# Patient Record
Sex: FEMALE | Race: ASIAN | NOT HISPANIC OR LATINO | URBAN - METROPOLITAN AREA
[De-identification: names, ages, dates, MRNs, and addresses within clinical notes are randomized per-mention and may not be internally consistent; named-entity substitution may affect disease eponyms.]

---

## 2019-01-09 ENCOUNTER — EMERGENCY (EMERGENCY)
Facility: HOSPITAL | Age: 29
LOS: 1 days | Discharge: HOME | End: 2019-01-09
Attending: EMERGENCY MEDICINE
Payer: MEDICAID

## 2019-01-09 VITALS
TEMPERATURE: 98 F | OXYGEN SATURATION: 100 % | WEIGHT: 141.98 LBS | HEIGHT: 63 IN | SYSTOLIC BLOOD PRESSURE: 100 MMHG | DIASTOLIC BLOOD PRESSURE: 64 MMHG | HEART RATE: 72 BPM | RESPIRATION RATE: 18 BRPM

## 2019-01-09 VITALS
TEMPERATURE: 98 F | DIASTOLIC BLOOD PRESSURE: 63 MMHG | OXYGEN SATURATION: 100 % | SYSTOLIC BLOOD PRESSURE: 101 MMHG | HEART RATE: 76 BPM | RESPIRATION RATE: 16 BRPM

## 2019-01-09 LAB
APPEARANCE UR: ABNORMAL
BACTERIA # UR AUTO: ABNORMAL
BILIRUB UR-MCNC: NEGATIVE — SIGNIFICANT CHANGE UP
COLOR SPEC: YELLOW — SIGNIFICANT CHANGE UP
DIFF PNL FLD: NEGATIVE — SIGNIFICANT CHANGE UP
EPI CELLS # UR: 35 /HPF — HIGH
GLUCOSE UR QL: NEGATIVE — SIGNIFICANT CHANGE UP
HYALINE CASTS # UR AUTO: 7 /LPF — HIGH (ref 0–2)
KETONES UR-MCNC: NEGATIVE — SIGNIFICANT CHANGE UP
LEUKOCYTE ESTERASE UR-ACNC: ABNORMAL
NITRITE UR-MCNC: NEGATIVE — SIGNIFICANT CHANGE UP
PH UR: 6 — SIGNIFICANT CHANGE UP (ref 5–8)
PROT UR-MCNC: ABNORMAL
RBC CASTS # UR COMP ASSIST: 2 /HPF — SIGNIFICANT CHANGE UP (ref 0–4)
SP GR SPEC: 1.03 — HIGH (ref 1.01–1.02)
UROBILINOGEN FLD QL: NEGATIVE — SIGNIFICANT CHANGE UP
WBC UR QL: 61 /HPF — HIGH (ref 0–5)

## 2019-01-09 PROCEDURE — 99283 EMERGENCY DEPT VISIT LOW MDM: CPT

## 2019-01-09 PROCEDURE — 87186 SC STD MICRODIL/AGAR DIL: CPT

## 2019-01-09 PROCEDURE — 81001 URINALYSIS AUTO W/SCOPE: CPT

## 2019-01-09 PROCEDURE — 87086 URINE CULTURE/COLONY COUNT: CPT

## 2019-01-09 RX ORDER — CEPHALEXIN 500 MG
1 CAPSULE ORAL
Qty: 15 | Refills: 0
Start: 2019-01-09 | End: 2019-01-13

## 2019-01-09 RX ORDER — METRONIDAZOLE 500 MG
1 TABLET ORAL
Qty: 21 | Refills: 0
Start: 2019-01-09 | End: 2019-01-15

## 2019-01-09 RX ORDER — METRONIDAZOLE 500 MG
500 TABLET ORAL ONCE
Qty: 0 | Refills: 0 | Status: COMPLETED | OUTPATIENT
Start: 2019-01-09 | End: 2019-01-09

## 2019-01-09 RX ORDER — CEPHALEXIN 500 MG
500 CAPSULE ORAL ONCE
Qty: 0 | Refills: 0 | Status: COMPLETED | OUTPATIENT
Start: 2019-01-09 | End: 2019-01-09

## 2019-01-09 RX ADMIN — Medication 500 MILLIGRAM(S): at 22:06

## 2019-01-09 RX ADMIN — Medication 500 MILLIGRAM(S): at 22:07

## 2019-01-09 NOTE — ED ADULT NURSE NOTE - OBJECTIVE STATEMENT
Patient is a 28 female complaining of abdominal pain. Arrived by walk-in. Patient has no significant PMH. Patient is A&O x 3. Pt reports abdominal pain for the past 2 months, has been seen by OB/GYN at home and pts  states she has a UTI, but was only prescribed ibuprofen and no antibiotics.  pt states she has dysuria during and after urination, and complains of frequency. Denies complaints of chest pain, sob, fevers, chills, n/v/d, headache, syncope, blood in urine, blood in stool. Abd is soft, tender to palpation in umbilicus and Lower quadrants, non distended . Skin is warm and dry. Color is consistent with ethnicity. LMP was dec 15. Safety and comfort maintained.  at the bedside. Will continue to monitor.

## 2019-01-09 NOTE — ED ADULT NURSE NOTE - CHPI ED NUR SYMPTOMS NEG
no blood in stool/no diarrhea/no abdominal distension/no nausea/no fever/no hematuria/no vomiting/no chills

## 2019-01-09 NOTE — ED PROVIDER NOTE - NSFOLLOWUPCLINICS_GEN_ALL_ED_FT
[facility] - [facility]  OB-GYN  [address] [place] [address]  Phone: [phone]  Fax:   Follow Up Time:

## 2019-01-09 NOTE — ED PROVIDER NOTE - OBJECTIVE STATEMENT
Patient presenting reporting burning with urination and lower abdominal dyscomfort for 2 months.  Seen initially for this by ADRIÁN in [place], reportedly had urine testing and some kind of imaging study, told to take tylenol and motrin.  Now moved to [place].  Reporting pain constant for past two months, primarily suprapubic associated with decreased urination.  Denying external lesions or rashes.  Symptoms improve with tylenol and motrin, no fevers or chills.

## 2019-01-09 NOTE — ED ADULT NURSE NOTE - NSIMPLEMENTINTERV_GEN_ALL_ED
Implemented All Universal Safety Interventions:  [place] to call system. Call bell, personal items and telephone within reach. Instruct patient to call for assistance. Room bathroom lighting operational. Non-slip footwear when patient is off stretcher. Physically safe environment: no spills, clutter or unnecessary equipment. Stretcher in lowest position, wheels locked, appropriate side rails in place.

## 2019-01-09 NOTE — ED PROVIDER NOTE - NSFOLLOWUPINSTRUCTIONS_ED_ALL_ED_FT
Take antibiotics as prescribed, follow up with OBGYN for further care.  Stop using douches or other vaginal cleaning products as these disturb your normal vaginal bacteria.

## 2019-01-09 NOTE — ED PROVIDER NOTE - GENITOURINARY, MLM
No CVA tenderness No CVA tenderness, thin white discharge present in vaginal vault, exam chaperoned by Emergency Department jimmy Martinez

## 2019-01-09 NOTE — ED PROVIDER NOTE - MEDICAL DECISION MAKING DETAILS
Patient presenting with 2 months of dysuria/burning on urination, will obtain UA/UC/U preg and re-evaluate Patient presenting with 2 months of dysuria/burning on urination, will obtain UA/UC/U preg and re-evaluate, exam also consistent with possible BV.

## 2019-01-11 NOTE — ED POST DISCHARGE NOTE - DETAILS
Pt started keflex, has been feeling less dysuria and frequency, no recorded fevers, chills, flank pain. Discussed urine culture results, to stop cephalexin and start macrobid and f.u with PMD this week for further evaluation. Translation from , declines  services. Discussed need to return with fevers, chills, flank pain, worsening or concerning symptoms, she notes understanding. -Rosalie Davidson PA-C 1/12/19: Pt started keflex, has been feeling less dysuria and frequency, no recorded fevers, chills, flank pain. Discussed urine culture results, to stop cephalexin and start macrobid and f.u with PMD this week for further evaluation. Translation from , declines  services. Discussed need to return with fevers, chills, flank pain, worsening or concerning symptoms, she notes understanding. -Rosalie Davidson PA-C

## 2019-01-11 NOTE — ED POST DISCHARGE NOTE - OTHER COMMUNICATION
1/11/19: Prelim ucx results as above. Pt discharged on Keflex for UTI. Will await final sensitivities to determine need for contact vs appropriate care given.  - Soy Savage PA-C

## 2019-01-12 RX ORDER — METHENAMINE MANDELATE 1 G
1 TABLET ORAL
Qty: 10 | Refills: 0
Start: 2019-01-12 | End: 2019-01-16

## 2019-02-01 ENCOUNTER — EMERGENCY (EMERGENCY)
Facility: HOSPITAL | Age: 29
LOS: 1 days | Discharge: HOME | End: 2019-02-01
Attending: EMERGENCY MEDICINE
Payer: MEDICAID

## 2019-02-01 ENCOUNTER — OUTPATIENT (OUTPATIENT)
Dept: OUTPATIENT SERVICES | Facility: HOSPITAL | Age: 29
LOS: 1 days | End: 2019-02-01
Payer: MEDICAID

## 2019-02-01 VITALS
RESPIRATION RATE: 16 BRPM | HEIGHT: 62 IN | SYSTOLIC BLOOD PRESSURE: 104 MMHG | DIASTOLIC BLOOD PRESSURE: 71 MMHG | OXYGEN SATURATION: 100 % | TEMPERATURE: 98 F | WEIGHT: 143.96 LBS | HEART RATE: 79 BPM

## 2019-02-01 PROCEDURE — G9001: CPT

## 2019-02-01 PROCEDURE — 99284 EMERGENCY DEPT VISIT MOD MDM: CPT

## 2019-02-01 NOTE — ED ADULT NURSE NOTE - NSIMPLEMENTINTERV_GEN_ALL_ED
Implemented All Universal Safety Interventions:  [place] to call system. Call bell, personal items and telephone within reach. Instruct patient to call for assistance. Room bathroom lighting operational. Non-slip footwear when patient is off stretcher. Physically safe environment: no spills, clutter or unnecessary equipment. Stretcher in lowest position, wheels locked, appropriate side rails in place.

## 2019-02-01 NOTE — ED ADULT NURSE NOTE - OBJECTIVE STATEMENT
patient came in complaining of right buttock pain radiating to right leg, right lower quadrant pain. whitish vaginal discharges. Subjective fever. Burning pain on urination. Symptoms started 5-6 days pta.

## 2019-02-02 VITALS
SYSTOLIC BLOOD PRESSURE: 113 MMHG | HEART RATE: 78 BPM | RESPIRATION RATE: 17 BRPM | DIASTOLIC BLOOD PRESSURE: 76 MMHG | TEMPERATURE: 98 F | OXYGEN SATURATION: 100 %

## 2019-02-02 LAB
APPEARANCE UR: CLEAR — SIGNIFICANT CHANGE UP
BACTERIA # UR AUTO: NEGATIVE — SIGNIFICANT CHANGE UP
BILIRUB UR-MCNC: NEGATIVE — SIGNIFICANT CHANGE UP
COLOR SPEC: SIGNIFICANT CHANGE UP
DIFF PNL FLD: NEGATIVE — SIGNIFICANT CHANGE UP
EPI CELLS # UR: 6 /HPF — HIGH
GLUCOSE UR QL: NEGATIVE — SIGNIFICANT CHANGE UP
HYALINE CASTS # UR AUTO: 0 /LPF — SIGNIFICANT CHANGE UP (ref 0–2)
KETONES UR-MCNC: NEGATIVE — SIGNIFICANT CHANGE UP
LEUKOCYTE ESTERASE UR-ACNC: ABNORMAL
N GONORRHOEA RRNA SPEC QL NAA+PROBE: SIGNIFICANT CHANGE UP
NITRITE UR-MCNC: NEGATIVE — SIGNIFICANT CHANGE UP
PH UR: 7 — SIGNIFICANT CHANGE UP (ref 5–8)
PROT UR-MCNC: NEGATIVE — SIGNIFICANT CHANGE UP
RBC CASTS # UR COMP ASSIST: 1 /HPF — SIGNIFICANT CHANGE UP (ref 0–4)
SP GR SPEC: 1.02 — SIGNIFICANT CHANGE UP (ref 1.01–1.02)
SPECIMEN SOURCE: SIGNIFICANT CHANGE UP
UROBILINOGEN FLD QL: NEGATIVE — SIGNIFICANT CHANGE UP
WBC UR QL: 9 /HPF — HIGH (ref 0–5)

## 2019-02-02 PROCEDURE — 99284 EMERGENCY DEPT VISIT MOD MDM: CPT

## 2019-02-02 PROCEDURE — 76830 TRANSVAGINAL US NON-OB: CPT

## 2019-02-02 PROCEDURE — 76830 TRANSVAGINAL US NON-OB: CPT | Mod: 26

## 2019-02-02 PROCEDURE — 76705 ECHO EXAM OF ABDOMEN: CPT | Mod: 26,59

## 2019-02-02 PROCEDURE — 93975 VASCULAR STUDY: CPT

## 2019-02-02 PROCEDURE — 76705 ECHO EXAM OF ABDOMEN: CPT

## 2019-02-02 PROCEDURE — 87591 N.GONORRHOEAE DNA AMP PROB: CPT

## 2019-02-02 PROCEDURE — 87086 URINE CULTURE/COLONY COUNT: CPT

## 2019-02-02 PROCEDURE — 81001 URINALYSIS AUTO W/SCOPE: CPT

## 2019-02-02 PROCEDURE — 93975 VASCULAR STUDY: CPT | Mod: 26

## 2019-02-02 RX ORDER — FLUCONAZOLE 150 MG/1
200 TABLET ORAL ONCE
Qty: 0 | Refills: 0 | Status: COMPLETED | OUTPATIENT
Start: 2019-02-02 | End: 2019-02-02

## 2019-02-02 RX ORDER — IBUPROFEN 200 MG
600 TABLET ORAL ONCE
Qty: 0 | Refills: 0 | Status: COMPLETED | OUTPATIENT
Start: 2019-02-02 | End: 2019-02-02

## 2019-02-02 RX ORDER — ACETAMINOPHEN 500 MG
975 TABLET ORAL ONCE
Qty: 0 | Refills: 0 | Status: COMPLETED | OUTPATIENT
Start: 2019-02-02 | End: 2019-02-02

## 2019-02-02 RX ADMIN — FLUCONAZOLE 200 MILLIGRAM(S): 150 TABLET ORAL at 07:14

## 2019-02-02 RX ADMIN — Medication 600 MILLIGRAM(S): at 07:14

## 2019-02-02 RX ADMIN — Medication 975 MILLIGRAM(S): at 07:14

## 2019-02-02 NOTE — ED PROVIDER NOTE - MEDICAL DECISION MAKING DETAILS
Harvey WRIGHT MD PGY1: 28 F p/w UTI sxs with previous urine cx resistant to multiple organisms. Will Harvey WRIGHT MD PGY1: 28 F p/w UTI sxs with previous urine cx resistant to multiple organisms. Will obtain UA, Ucx, and transvaginal us to eval for ovarian cyst/pathology. Likely dispo home with follow-up with OBGYN.

## 2019-02-02 NOTE — ED PROVIDER NOTE - NSFOLLOWUPCLINICS_GEN_ALL_ED_FT
[facility] - [facility]  OB-GYN  [address] [address]  Phone: [phone]  Fax:   Follow Up Time:

## 2019-02-02 NOTE — ED ADULT NURSE REASSESSMENT NOTE - NS ED NURSE REASSESS COMMENT FT1
received report from YOUSIF Wing. patient is resting comfortably in bed in no acute distress. patient is AAOx3. lung sounds clear. cap refill <3sec. patient c/o abdominal pain 5/10 medication given as ordered. VSS. to be d.c
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
## 2019-02-02 NOTE — ED PROVIDER NOTE - CARE PLAN
Principal Discharge DX:	Cystitis Principal Discharge DX:	Ovarian cyst  Secondary Diagnosis:	Yeast infection of the vagina

## 2019-02-02 NOTE — ED PROVIDER NOTE - PHYSICAL EXAMINATION
Harvey WRIGHT MD PGY1:   PHYSICAL EXAM:    GENERAL: NAD, well-developed  HEENT:  Atraumatic, Normocephalic  CHEST/LUNG: Chest rise equal bilaterally.   HEART: Regular rate and rhythm  ABDOMEN: Suprapubic TTP, no CVA tenderness.  : R adnexal TTP. Physiologic white vaginal discharge.   EXTREMITIES:  2+ Peripheral Pulses.  PSYCH: A&Ox3  SKIN: No obvious rashes or lesions

## 2019-02-02 NOTE — ED PROVIDER NOTE - OBJECTIVE STATEMENT
Harvey WRIGHT MD PGY1: 28 F hx multiple UTI p/w Harvey WRIGHT MD PGY1: 28 F hx multiple UTI p/w dysuria and frequency x 5 days without exacerbating or relieving factors asssoc with white vaginal discharge. No fevers or chills. Has had the white vaginal discharge x 1 month not improved with metronidazole therapy. No back pain. Prev Ucx resistant to multiple organisms.

## 2019-02-02 NOTE — ED PROVIDER NOTE - NSFOLLOWUPINSTRUCTIONS_ED_ALL_ED_FT
You were seen in the ED for abdominal pain, pain with urination and vaginal discharge. We treated you with diflucan, diagnosed a hemorragic cyst in your right ovary, and don't think you have a UTI. Please return to the ED for any concerns. Please follow-up with OBGYN as an outpatient. Please take tylenol and motrin as needed for pain.

## 2019-02-04 LAB
CULTURE RESULTS: SIGNIFICANT CHANGE UP
SPECIMEN SOURCE: SIGNIFICANT CHANGE UP

## 2019-02-08 DIAGNOSIS — Z71.89 OTHER SPECIFIED COUNSELING: ICD-10-CM

## 2019-03-15 ENCOUNTER — EMERGENCY (EMERGENCY)
Facility: HOSPITAL | Age: 29
LOS: 1 days | Discharge: HOME | End: 2019-03-15
Attending: EMERGENCY MEDICINE
Payer: COMMERCIAL

## 2019-03-15 VITALS
TEMPERATURE: 98 F | DIASTOLIC BLOOD PRESSURE: 68 MMHG | HEART RATE: 70 BPM | SYSTOLIC BLOOD PRESSURE: 103 MMHG | OXYGEN SATURATION: 99 % | RESPIRATION RATE: 17 BRPM

## 2019-03-15 VITALS
OXYGEN SATURATION: 100 % | DIASTOLIC BLOOD PRESSURE: 62 MMHG | RESPIRATION RATE: 19 BRPM | HEART RATE: 64 BPM | TEMPERATURE: 98 F | SYSTOLIC BLOOD PRESSURE: 96 MMHG

## 2019-03-15 LAB
ALBUMIN SERPL ELPH-MCNC: 4.3 G/DL — SIGNIFICANT CHANGE UP (ref 3.3–5)
ALP SERPL-CCNC: 52 U/L — SIGNIFICANT CHANGE UP (ref 40–120)
ALT FLD-CCNC: 19 U/L — SIGNIFICANT CHANGE UP (ref 10–45)
ANION GAP SERPL CALC-SCNC: 12 MMOL/L — SIGNIFICANT CHANGE UP (ref 5–17)
APPEARANCE UR: ABNORMAL
APTT BLD: 31.1 SEC — SIGNIFICANT CHANGE UP (ref 27.5–36.3)
AST SERPL-CCNC: 22 U/L — SIGNIFICANT CHANGE UP (ref 10–40)
BACTERIA # UR AUTO: ABNORMAL
BASOPHILS # BLD AUTO: 0.1 K/UL — SIGNIFICANT CHANGE UP (ref 0–0.2)
BASOPHILS NFR BLD AUTO: 0.7 % — SIGNIFICANT CHANGE UP (ref 0–2)
BILIRUB SERPL-MCNC: 0.1 MG/DL — LOW (ref 0.2–1.2)
BILIRUB UR-MCNC: NEGATIVE — SIGNIFICANT CHANGE UP
BLD GP AB SCN SERPL QL: NEGATIVE — SIGNIFICANT CHANGE UP
BUN SERPL-MCNC: 11 MG/DL — SIGNIFICANT CHANGE UP (ref 7–23)
CALCIUM SERPL-MCNC: 9.2 MG/DL — SIGNIFICANT CHANGE UP (ref 8.4–10.5)
CHLORIDE SERPL-SCNC: 106 MMOL/L — SIGNIFICANT CHANGE UP (ref 96–108)
CO2 SERPL-SCNC: 20 MMOL/L — LOW (ref 22–31)
COLOR SPEC: SIGNIFICANT CHANGE UP
CREAT SERPL-MCNC: 0.66 MG/DL — SIGNIFICANT CHANGE UP (ref 0.5–1.3)
DIFF PNL FLD: NEGATIVE — SIGNIFICANT CHANGE UP
EOSINOPHIL # BLD AUTO: 0.2 K/UL — SIGNIFICANT CHANGE UP (ref 0–0.5)
EOSINOPHIL NFR BLD AUTO: 1.8 % — SIGNIFICANT CHANGE UP (ref 0–6)
EPI CELLS # UR: 15 /HPF — HIGH
GLUCOSE SERPL-MCNC: 99 MG/DL — SIGNIFICANT CHANGE UP (ref 70–99)
GLUCOSE UR QL: NEGATIVE — SIGNIFICANT CHANGE UP
HCG SERPL-ACNC: <2 MIU/ML — SIGNIFICANT CHANGE UP
HCT VFR BLD CALC: 36.4 % — SIGNIFICANT CHANGE UP (ref 34.5–45)
HGB BLD-MCNC: 12.5 G/DL — SIGNIFICANT CHANGE UP (ref 11.5–15.5)
HYALINE CASTS # UR AUTO: 3 /LPF — HIGH (ref 0–2)
INR BLD: 1.02 RATIO — SIGNIFICANT CHANGE UP (ref 0.88–1.16)
KETONES UR-MCNC: SIGNIFICANT CHANGE UP
LEUKOCYTE ESTERASE UR-ACNC: ABNORMAL
LIDOCAIN IGE QN: 37 U/L — SIGNIFICANT CHANGE UP (ref 7–60)
LYMPHOCYTES # BLD AUTO: 3.9 K/UL — HIGH (ref 1–3.3)
LYMPHOCYTES # BLD AUTO: 37.8 % — SIGNIFICANT CHANGE UP (ref 13–44)
MCHC RBC-ENTMCNC: 29 PG — SIGNIFICANT CHANGE UP (ref 27–34)
MCHC RBC-ENTMCNC: 34.2 GM/DL — SIGNIFICANT CHANGE UP (ref 32–36)
MCV RBC AUTO: 84.7 FL — SIGNIFICANT CHANGE UP (ref 80–100)
MONOCYTES # BLD AUTO: 0.8 K/UL — SIGNIFICANT CHANGE UP (ref 0–0.9)
MONOCYTES NFR BLD AUTO: 7.5 % — SIGNIFICANT CHANGE UP (ref 2–14)
NEUTROPHILS # BLD AUTO: 5.4 K/UL — SIGNIFICANT CHANGE UP (ref 1.8–7.4)
NEUTROPHILS NFR BLD AUTO: 52.3 % — SIGNIFICANT CHANGE UP (ref 43–77)
NITRITE UR-MCNC: NEGATIVE — SIGNIFICANT CHANGE UP
PH UR: 6 — SIGNIFICANT CHANGE UP (ref 5–8)
PLATELET # BLD AUTO: 261 K/UL — SIGNIFICANT CHANGE UP (ref 150–400)
POTASSIUM SERPL-MCNC: 4.1 MMOL/L — SIGNIFICANT CHANGE UP (ref 3.5–5.3)
POTASSIUM SERPL-SCNC: 4.1 MMOL/L — SIGNIFICANT CHANGE UP (ref 3.5–5.3)
PROT SERPL-MCNC: 7 G/DL — SIGNIFICANT CHANGE UP (ref 6–8.3)
PROT UR-MCNC: NEGATIVE — SIGNIFICANT CHANGE UP
PROTHROM AB SERPL-ACNC: 11.6 SEC — SIGNIFICANT CHANGE UP (ref 10–12.9)
RBC # BLD: 4.3 M/UL — SIGNIFICANT CHANGE UP (ref 3.8–5.2)
RBC # FLD: 14.5 % — SIGNIFICANT CHANGE UP (ref 10.3–14.5)
RBC CASTS # UR COMP ASSIST: 1 /HPF — SIGNIFICANT CHANGE UP (ref 0–4)
RH IG SCN BLD-IMP: POSITIVE — SIGNIFICANT CHANGE UP
SODIUM SERPL-SCNC: 138 MMOL/L — SIGNIFICANT CHANGE UP (ref 135–145)
SP GR SPEC: 1.01 — SIGNIFICANT CHANGE UP (ref 1.01–1.02)
UROBILINOGEN FLD QL: NEGATIVE — SIGNIFICANT CHANGE UP
WBC # BLD: 10.4 K/UL — SIGNIFICANT CHANGE UP (ref 3.8–10.5)
WBC # FLD AUTO: 10.4 K/UL — SIGNIFICANT CHANGE UP (ref 3.8–10.5)
WBC UR QL: 7 /HPF — HIGH (ref 0–5)

## 2019-03-15 PROCEDURE — 99284 EMERGENCY DEPT VISIT MOD MDM: CPT | Mod: 25

## 2019-03-15 PROCEDURE — 70450 CT HEAD/BRAIN W/O DYE: CPT

## 2019-03-15 PROCEDURE — 73630 X-RAY EXAM OF FOOT: CPT

## 2019-03-15 PROCEDURE — 86850 RBC ANTIBODY SCREEN: CPT

## 2019-03-15 PROCEDURE — 86901 BLOOD TYPING SEROLOGIC RH(D): CPT

## 2019-03-15 PROCEDURE — 99284 EMERGENCY DEPT VISIT MOD MDM: CPT

## 2019-03-15 PROCEDURE — 71045 X-RAY EXAM CHEST 1 VIEW: CPT | Mod: 26

## 2019-03-15 PROCEDURE — 71260 CT THORAX DX C+: CPT

## 2019-03-15 PROCEDURE — 72125 CT NECK SPINE W/O DYE: CPT | Mod: 26

## 2019-03-15 PROCEDURE — 73630 X-RAY EXAM OF FOOT: CPT | Mod: 26,LT

## 2019-03-15 PROCEDURE — 85730 THROMBOPLASTIN TIME PARTIAL: CPT

## 2019-03-15 PROCEDURE — 72125 CT NECK SPINE W/O DYE: CPT

## 2019-03-15 PROCEDURE — 74177 CT ABD & PELVIS W/CONTRAST: CPT | Mod: 26

## 2019-03-15 PROCEDURE — 71260 CT THORAX DX C+: CPT | Mod: 26

## 2019-03-15 PROCEDURE — 73610 X-RAY EXAM OF ANKLE: CPT | Mod: 26,LT

## 2019-03-15 PROCEDURE — 85610 PROTHROMBIN TIME: CPT

## 2019-03-15 PROCEDURE — 83690 ASSAY OF LIPASE: CPT

## 2019-03-15 PROCEDURE — 81001 URINALYSIS AUTO W/SCOPE: CPT

## 2019-03-15 PROCEDURE — 84702 CHORIONIC GONADOTROPIN TEST: CPT

## 2019-03-15 PROCEDURE — 80053 COMPREHEN METABOLIC PANEL: CPT

## 2019-03-15 PROCEDURE — 96374 THER/PROPH/DIAG INJ IV PUSH: CPT | Mod: XU

## 2019-03-15 PROCEDURE — 74177 CT ABD & PELVIS W/CONTRAST: CPT

## 2019-03-15 PROCEDURE — 85027 COMPLETE CBC AUTOMATED: CPT

## 2019-03-15 PROCEDURE — 86900 BLOOD TYPING SEROLOGIC ABO: CPT

## 2019-03-15 PROCEDURE — 70450 CT HEAD/BRAIN W/O DYE: CPT | Mod: 26

## 2019-03-15 PROCEDURE — 73610 X-RAY EXAM OF ANKLE: CPT

## 2019-03-15 PROCEDURE — 71045 X-RAY EXAM CHEST 1 VIEW: CPT

## 2019-03-15 RX ORDER — SODIUM CHLORIDE 9 MG/ML
1000 INJECTION INTRAMUSCULAR; INTRAVENOUS; SUBCUTANEOUS ONCE
Qty: 0 | Refills: 0 | Status: COMPLETED | OUTPATIENT
Start: 2019-03-15 | End: 2019-03-15

## 2019-03-15 RX ORDER — ONDANSETRON 8 MG/1
4 TABLET, FILM COATED ORAL ONCE
Qty: 0 | Refills: 0 | Status: COMPLETED | OUTPATIENT
Start: 2019-03-15 | End: 2019-03-15

## 2019-03-15 RX ADMIN — SODIUM CHLORIDE 1000 MILLILITER(S): 9 INJECTION INTRAMUSCULAR; INTRAVENOUS; SUBCUTANEOUS at 19:22

## 2019-03-15 RX ADMIN — ONDANSETRON 4 MILLIGRAM(S): 8 TABLET, FILM COATED ORAL at 19:22
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
## 2019-03-15 NOTE — ED ADULT NURSE NOTE - CHPI ED NUR SYMPTOMS NEG
no difficulty bearing weight/no disorientation/no dizziness/no acting out behaviors/no laceration/no fussiness/no crying/no decreased eating/drinking/no loss of consciousness/no sleeping issues/no bruising

## 2019-03-15 NOTE — ED PROVIDER NOTE - PROGRESS NOTE DETAILS
B-hcg sent.  Pt understands that even if + she will require CTs given concern for intra-abd/thoracic/cranial injury in light of physical exam findings.  Understands, agrees.   service used for this conversation.  --MAYRA C- collar's cleared by Dr. Cee. Full ROMs, no midline tender. B/L para cervical tender without swelling or lesions. No focal neuro deficit.
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
## 2019-03-15 NOTE — ED PROVIDER NOTE - NSFOLLOWUPINSTRUCTIONS_ED_ALL_ED_FT
Hydrate.  Motrin (Advil or Ibuprofen) 600mg every 8hours for pain with food.  Tylenol 500mg or 650mg every 6hours for pain as needed.  Follow up with your primary Dr. for reevaluation in 2-3days.  Follow up with your GYN for incidental finding of ovarian cyst in this week, call Monday for an appointment.  Follow up with spine center, [phone], call Monday for an appointment.  Return for any concerns or worsening symptoms.

## 2019-03-15 NOTE — ED PROVIDER NOTE - PHYSICAL EXAMINATION
GENERAL: AAOx4, GCS 15, NAD, WDWN; HEENT: MMM, no jugular venous distension, c-collar in place, +midline < R paracervical TTP, PERRLA, EOMI, nonicteric sclera; PULM: CTA B, no crackles/rubs/rales.  Chest wall TTP at sternum without stepoff/crepitus/flail segment; CV: RRR, S1S2, no MRG; ABD: Obese, soft abd, +b/l lower abd TTP, no R/G/R, no CVAT.  MSK: L ankle TTP without deformity, worse at lat mal and prox 5th MT.  BURCH, +2 pulses x4;  NEURO: No obvious focal deficits; PSYCH: AAOx3, clear thought and normal sensorium.  SKIN -- no seatbelt sign, ana/grey moran.

## 2019-12-06 ENCOUNTER — EMERGENCY (EMERGENCY)
Facility: HOSPITAL | Age: 29
LOS: 1 days | Discharge: HOME | End: 2019-12-06
Attending: EMERGENCY MEDICINE | Admitting: EMERGENCY MEDICINE
Payer: MEDICAID

## 2019-12-06 VITALS
OXYGEN SATURATION: 99 % | RESPIRATION RATE: 15 BRPM | SYSTOLIC BLOOD PRESSURE: 113 MMHG | HEART RATE: 118 BPM | TEMPERATURE: 99 F | DIASTOLIC BLOOD PRESSURE: 72 MMHG

## 2019-12-06 VITALS
RESPIRATION RATE: 16 BRPM | OXYGEN SATURATION: 99 % | SYSTOLIC BLOOD PRESSURE: 115 MMHG | HEART RATE: 107 BPM | DIASTOLIC BLOOD PRESSURE: 64 MMHG | TEMPERATURE: 98 F

## 2019-12-06 PROCEDURE — 99283 EMERGENCY DEPT VISIT LOW MDM: CPT

## 2019-12-06 RX ORDER — SODIUM CHLORIDE 9 MG/ML
1000 INJECTION INTRAMUSCULAR; INTRAVENOUS; SUBCUTANEOUS ONCE
Refills: 0 | Status: COMPLETED | OUTPATIENT
Start: 2019-12-06 | End: 2019-12-06

## 2019-12-06 RX ADMIN — SODIUM CHLORIDE 1000 MILLILITER(S): 9 INJECTION INTRAMUSCULAR; INTRAVENOUS; SUBCUTANEOUS at 04:41
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
## 2019-12-06 NOTE — ED PROVIDER NOTE - ATTENDING CONTRIBUTION TO CARE
MD Gayle:  I performed a face to face bedside interview with patient regarding history of present illness, review of symptoms and past medical history. I completed an independent physical exam(documented below).  I have discussed patient's plan of care with resident.   I agree with note as stated above, having amended the EMR as needed to reflect my findings. I have discussed the assessment and plan of care.  This includes during the time I functioned as the attending physician for this patient.  PE:  Gen: Alert, NAD, mildly inebriated  Head: NC, AT,  EOMI, normal lids/conjunctiva  ENT:  normal hearing, patent oropharynx without erythema/exudate  Neck: +supple, no tenderness/meningismus/JVD, +Trachea midline  Chest: no chest wall tenderness, equal chest rise  Pulm: Bilateral BS, normal resp effort, no wheeze/stridor/retractions  CV: tachy, no M/R/G, +dist pulses  Abd: +BS, soft, NT/ND  Rectal: deferred  Mskel: no edema/erythema/cyanosis  Skin: no rash  Neuro: AAOx3, no sensory/motor deficits, CN 2-12 intact   MDM:  28yo F, pmh of asthma, c/o palpitations after drinking alcohol for first time ever ("tall shot of crown royal"). Denies cp (despite mention of this on triage note), sob, hx of arrhythmias, dizziness/lightheadedness. Denies SI/HI/hallucinations. ECG is sinus tachycardia w/o ischemic findings. Mildly clinically dehydrated. IV fluids and d/c to family's care.

## 2019-12-06 NOTE — ED PROVIDER NOTE - PATIENT PORTAL LINK FT
You can access the FollowMyHealth Patient Portal offered by [facility] by registering at the following website: [email or web address]. By joining [facility]’s FollowMyHealth portal, you will also be able to view your health information using other applications (apps) compatible with our system.

## 2019-12-06 NOTE — ED PROVIDER NOTE - NSFOLLOWUPINSTRUCTIONS_ED_ALL_ED_FT
Please follow up with your primary care physician.  If you developed worsening feelings of sadness, please seek medical or psychological help.

## 2019-12-06 NOTE — ED PROVIDER NOTE - PHYSICAL EXAMINATION
Gen:  NAD, alert and oriented, mildly intoxicated  HEENT:  MMM, sclera clear  Heart:  Tachycardic, regular rhythm, no M/R/G  Lung:  CTA b/l, no rales or wheeze  Abd:  ND, soft, NT  Ext:  No edema  Skin:  No rash, no ecchymosis  Neuro:  Nonfocal

## 2019-12-06 NOTE — ED PROVIDER NOTE - OBJECTIVE STATEMENT
29 y.o. female p/w palpitations after her first experience ever drinking alcohol.  She was feeling sad and lonely after thinking about her inability to have children, and drank a tall shot of [place], and then felt palpitations.  She denies SI or HI.  She feels safe at home.  She is , and lives with other cousins and family members as well.  She works as a home health aid.  No other medical conditions.

## 2020-01-21 PROBLEM — Z00.00 ENCOUNTER FOR PREVENTIVE HEALTH EXAMINATION: Status: ACTIVE | Noted: 2020-01-21

## 2020-01-23 ENCOUNTER — RESULT CHARGE (OUTPATIENT)
Age: 30
End: 2020-01-23

## 2020-01-23 ENCOUNTER — OUTPATIENT (OUTPATIENT)
Dept: OUTPATIENT SERVICES | Facility: HOSPITAL | Age: 30
LOS: 1 days | End: 2020-01-23
Payer: MEDICAID

## 2020-01-23 ENCOUNTER — LABORATORY RESULT (OUTPATIENT)
Age: 30
End: 2020-01-23

## 2020-01-23 ENCOUNTER — APPOINTMENT (OUTPATIENT)
Dept: OBGYN | Facility: HOSPITAL | Age: 30
End: 2020-01-23
Payer: SELF-PAY

## 2020-01-23 ENCOUNTER — RESULT REVIEW (OUTPATIENT)
Age: 30
End: 2020-01-23

## 2020-01-23 VITALS
WEIGHT: 160.8 LBS | HEIGHT: 63 IN | HEART RATE: 71 BPM | DIASTOLIC BLOOD PRESSURE: 63 MMHG | SYSTOLIC BLOOD PRESSURE: 112 MMHG | BODY MASS INDEX: 28.49 KG/M2

## 2020-01-23 DIAGNOSIS — Z80.1 FAMILY HISTORY OF MALIGNANT NEOPLASM OF TRACHEA, BRONCHUS AND LUNG: ICD-10-CM

## 2020-01-23 DIAGNOSIS — Z78.9 OTHER SPECIFIED HEALTH STATUS: ICD-10-CM

## 2020-01-23 LAB
APPEARANCE UR: CLEAR — SIGNIFICANT CHANGE UP
BACTERIA # UR AUTO: NEGATIVE — SIGNIFICANT CHANGE UP
BILIRUB UR-MCNC: NEGATIVE — SIGNIFICANT CHANGE UP
BLOOD UR QL VISUAL: NEGATIVE — SIGNIFICANT CHANGE UP
COLOR SPEC: YELLOW — SIGNIFICANT CHANGE UP
GLUCOSE UR-MCNC: NEGATIVE — SIGNIFICANT CHANGE UP
HCG UR QL: NEGATIVE
HYALINE CASTS # UR AUTO: SIGNIFICANT CHANGE UP
KETONES UR-MCNC: NEGATIVE — SIGNIFICANT CHANGE UP
LEUKOCYTE ESTERASE UR-ACNC: SIGNIFICANT CHANGE UP
NITRITE UR-MCNC: NEGATIVE — SIGNIFICANT CHANGE UP
PH UR: 6.5 — SIGNIFICANT CHANGE UP (ref 5–8)
PROT UR-MCNC: 20 — SIGNIFICANT CHANGE UP
RBC CASTS # UR COMP ASSIST: SIGNIFICANT CHANGE UP (ref 0–?)
SP GR SPEC: 1.03 — SIGNIFICANT CHANGE UP (ref 1–1.04)
SQUAMOUS # UR AUTO: SIGNIFICANT CHANGE UP
UROBILINOGEN FLD QL: SIGNIFICANT CHANGE UP
WBC UR QL: HIGH (ref 0–?)

## 2020-01-23 PROCEDURE — 99203 OFFICE O/P NEW LOW 30 MIN: CPT | Mod: GE

## 2020-01-23 PROCEDURE — 88141 CYTOPATH C/V INTERPRET: CPT

## 2020-01-24 LAB
C TRACH RRNA SPEC QL NAA+PROBE: SIGNIFICANT CHANGE UP
N GONORRHOEA RRNA SPEC QL NAA+PROBE: SIGNIFICANT CHANGE UP
SPECIMEN SOURCE: SIGNIFICANT CHANGE UP
SPECIMEN SOURCE: SIGNIFICANT CHANGE UP

## 2020-01-25 LAB — BACTERIA UR CULT: SIGNIFICANT CHANGE UP

## 2020-01-28 DIAGNOSIS — N97.9 FEMALE INFERTILITY, UNSPECIFIED: ICD-10-CM

## 2020-01-28 DIAGNOSIS — N64.52 NIPPLE DISCHARGE: ICD-10-CM

## 2020-01-28 DIAGNOSIS — Z80.1 FAMILY HISTORY OF MALIGNANT NEOPLASM OF TRACHEA, BRONCHUS AND LUNG: ICD-10-CM

## 2020-01-28 DIAGNOSIS — Z78.9 OTHER SPECIFIED HEALTH STATUS: ICD-10-CM

## 2020-01-28 DIAGNOSIS — R30.0 DYSURIA: ICD-10-CM

## 2020-01-29 LAB — CYTOLOGY SPEC DOC CYTO: SIGNIFICANT CHANGE UP

## 2020-01-29 NOTE — REVIEW OF SYSTEMS
[Dysuria] : dysuria [Breast Pain] : breast pain [Nl] : Musculoskeletal [Pelvic Pain] : no pelvic pain [Urgency] : no urgency [Frequency] : no frequency [Breast Lump] : no breast lump [Skin Lesions] : no skin lesions

## 2020-01-29 NOTE — PHYSICAL EXAM
[Awake] : awake [Alert] : alert [Examination Of The Breasts] : a normal appearance [No Discharge] : no discharge [Breast Mass Left Breast ___cm] : no mass was palpable [___cm] : a ~M [unfilled] ~Ucm subareolar mass was palpated [Soft] : soft [Oriented x3] : oriented to person, place, and time [No Bleeding] : there was no active vaginal bleeding [Normal] : uterus [Normal Position] : in a normal position [Uterine Adnexae] : were not tender and not enlarged [Acute Distress] : no acute distress [Axillary Lymph Nodes Enlarged Bilaterally] : no enlarged nodes [Tender] : non tender

## 2020-01-30 DIAGNOSIS — N63.0 UNSPECIFIED LUMP IN UNSPECIFIED BREAST: ICD-10-CM

## 2020-01-30 RX ORDER — FLUCONAZOLE 150 MG/1
150 TABLET ORAL
Qty: 1 | Refills: 0 | Status: ACTIVE | COMMUNITY
Start: 2020-01-30 | End: 1900-01-01

## 2020-02-18 ENCOUNTER — LABORATORY RESULT (OUTPATIENT)
Age: 30
End: 2020-02-18

## 2020-02-18 ENCOUNTER — OUTPATIENT (OUTPATIENT)
Dept: OUTPATIENT SERVICES | Facility: HOSPITAL | Age: 30
LOS: 1 days | End: 2020-02-18

## 2020-02-18 ENCOUNTER — APPOINTMENT (OUTPATIENT)
Dept: OBGYN | Facility: HOSPITAL | Age: 30
End: 2020-02-18

## 2020-02-18 LAB
BLD GP AB SCN SERPL QL: NEGATIVE — SIGNIFICANT CHANGE UP
RH IG SCN BLD-IMP: POSITIVE — SIGNIFICANT CHANGE UP
TSH SERPL-MCNC: 2.24 UIU/ML — SIGNIFICANT CHANGE UP (ref 0.27–4.2)

## 2020-02-19 DIAGNOSIS — Z00.00 ENCOUNTER FOR GENERAL ADULT MEDICAL EXAMINATION WITHOUT ABNORMAL FINDINGS: ICD-10-CM

## 2020-02-19 LAB
ESTRADIOL FREE SERPL-MCNC: 41 PG/ML — SIGNIFICANT CHANGE UP
FSH SERPL-MCNC: 6.5 IU/L — SIGNIFICANT CHANGE UP
HBV SURFACE AG SER-ACNC: NONREACTIVE — SIGNIFICANT CHANGE UP
HCV AB S/CO SERPL IA: 0.1 S/CO — SIGNIFICANT CHANGE UP (ref 0–0.99)
HCV AB SERPL-IMP: SIGNIFICANT CHANGE UP
HIV 1+2 AB+HIV1 P24 AG SERPL QL IA: SIGNIFICANT CHANGE UP
PROLACTIN SERPL-MCNC: 23.4 NG/ML — SIGNIFICANT CHANGE UP (ref 3.4–24.1)
RUBV IGG SER-ACNC: 17.6 INDEX — SIGNIFICANT CHANGE UP
RUBV IGG SER-IMP: POSITIVE — SIGNIFICANT CHANGE UP
VZV IGG FLD QL IA: 24 INDEX — SIGNIFICANT CHANGE UP
VZV IGG FLD QL IA: NEGATIVE — SIGNIFICANT CHANGE UP

## 2020-03-02 ENCOUNTER — EMERGENCY (EMERGENCY)
Facility: HOSPITAL | Age: 30
LOS: 1 days | Discharge: HOME | End: 2020-03-02
Attending: EMERGENCY MEDICINE | Admitting: PERSONAL EMERGENCY RESPONSE ATTENDANT
Payer: MEDICAID

## 2020-03-02 VITALS
SYSTOLIC BLOOD PRESSURE: 115 MMHG | RESPIRATION RATE: 16 BRPM | OXYGEN SATURATION: 100 % | DIASTOLIC BLOOD PRESSURE: 68 MMHG | TEMPERATURE: 98 F | HEART RATE: 71 BPM

## 2020-03-02 VITALS
SYSTOLIC BLOOD PRESSURE: 105 MMHG | TEMPERATURE: 98 F | RESPIRATION RATE: 18 BRPM | HEART RATE: 67 BPM | DIASTOLIC BLOOD PRESSURE: 61 MMHG | OXYGEN SATURATION: 100 %

## 2020-03-02 LAB
APPEARANCE UR: SIGNIFICANT CHANGE UP
BACTERIA # UR AUTO: SIGNIFICANT CHANGE UP
BILIRUB UR-MCNC: NEGATIVE — SIGNIFICANT CHANGE UP
BLOOD UR QL VISUAL: HIGH
C TRACH RRNA SPEC QL NAA+PROBE: SIGNIFICANT CHANGE UP
COLOR SPEC: SIGNIFICANT CHANGE UP
GLUCOSE UR-MCNC: NEGATIVE — SIGNIFICANT CHANGE UP
HYALINE CASTS # UR AUTO: SIGNIFICANT CHANGE UP
KETONES UR-MCNC: NEGATIVE — SIGNIFICANT CHANGE UP
LEUKOCYTE ESTERASE UR-ACNC: SIGNIFICANT CHANGE UP
N GONORRHOEA RRNA SPEC QL NAA+PROBE: SIGNIFICANT CHANGE UP
NITRITE UR-MCNC: NEGATIVE — SIGNIFICANT CHANGE UP
PH UR: 6.5 — SIGNIFICANT CHANGE UP (ref 5–8)
PROT UR-MCNC: 50 — SIGNIFICANT CHANGE UP
RBC CASTS # UR COMP ASSIST: HIGH (ref 0–?)
SP GR SPEC: 1.01 — SIGNIFICANT CHANGE UP (ref 1–1.04)
SPECIMEN SOURCE: SIGNIFICANT CHANGE UP
SPECIMEN SOURCE: SIGNIFICANT CHANGE UP
SQUAMOUS # UR AUTO: SIGNIFICANT CHANGE UP
UROBILINOGEN FLD QL: NORMAL — SIGNIFICANT CHANGE UP
WBC UR QL: >50 — HIGH (ref 0–?)

## 2020-03-02 PROCEDURE — 99284 EMERGENCY DEPT VISIT MOD MDM: CPT

## 2020-03-02 PROCEDURE — 76830 TRANSVAGINAL US NON-OB: CPT | Mod: 26

## 2020-03-02 RX ORDER — PHENAZOPYRIDINE HCL 100 MG
100 TABLET ORAL ONCE
Refills: 0 | Status: COMPLETED | OUTPATIENT
Start: 2020-03-02 | End: 2020-03-02

## 2020-03-02 RX ORDER — METHENAMINE MANDELATE 1 G
1 TABLET ORAL
Qty: 10 | Refills: 0
Start: 2020-03-02 | End: 2020-03-06

## 2020-03-02 RX ORDER — PHENAZOPYRIDINE HCL 100 MG
1 TABLET ORAL
Qty: 6 | Refills: 0
Start: 2020-03-02 | End: 2020-03-03

## 2020-03-02 RX ORDER — NITROFURANTOIN MACROCRYSTAL 50 MG
100 CAPSULE ORAL ONCE
Refills: 0 | Status: COMPLETED | OUTPATIENT
Start: 2020-03-02 | End: 2020-03-02

## 2020-03-02 RX ADMIN — Medication 100 MILLIGRAM(S): at 06:51

## 2020-03-02 RX ADMIN — Medication 100 MILLIGRAM(S): at 05:35

## 2020-03-02 NOTE — ED PROVIDER NOTE - PROGRESS NOTE DETAILS
Héctor, PGY1 - Pt endorses improvement in urinary symptoms. Héctor, PGY1 - Pt endorses improvement in urinary symptoms. No hx STDs, & cervix & Héctor, PGY1 - Pt endorses improvement in urinary symptoms. No hx STDs, & cervix was not erythematous & cervical dc was physiologic.  As such won't emperically tx for STDs but will send dirty urine. Attending MD Fernandes.  Pt signed out to me in stable condition pending U/S d/c with abxs if neg, 31 yo female with hx UTI's, physiol d/c, no presumptive tx, adnexal TTP to us. Attending MD Fernandes.  Pt's us non-actionable.  Sxs improved from arrival.  Stable for d/c with abxs for UTI.  Return to ED for fevers/chills/n/v/d/inability to tolerate abxs or PO/development of flank pain or inability to urinate.

## 2020-03-02 NOTE — ED ADULT NURSE NOTE - CHIEF COMPLAINT QUOTE
Pt arrives with  Non English Sydni speaking. c/o too much burning pain in vaginal area especially when urinating...started last night. Pt appears very uncomfortable. + urinary frequency . As per  "same happen few months back went to [facility] treated for a bacterial infection...that time she had fevers."

## 2020-03-02 NOTE — ED ADULT TRIAGE NOTE - SPO2 (%)
Patient is being referred by Dr Liz for Pulmonary Nodule.  Would like patient seen asap please.   100

## 2020-03-02 NOTE — ED PROVIDER NOTE - ATTENDING CONTRIBUTION TO CARE
30F hx freq UTIs incl ESBL E.coli 1/*2019 now p/w dysuria and urinary urgency x 2 days. Subjective fever today, but no n/v. No chills.  Similar to prior UTIs.  Associated with malodorous, white vaginal discharge for past week. LMP 2 weeks ago.    VS: afebrile, others   Gen: Well appearing in NAD  Head: NC/AT  HEENT: moist mucous membranes   Neck: trachea midline  Resp:  No distress  Abd: +SP ttp w/o CVAT  Ext: no deformities  Neuro:  A&Ox3  Skin:  Warm and dry as visualized  Psych:  appropriate affect and mood    MDM: c/w UTI w/o concerns for pyelo or other systemic infx. Will offer pelvic exam given vag dc.  UA, UCx, UCG, possible STD testing

## 2020-03-02 NOTE — ED PROVIDER NOTE - PHYSICAL EXAMINATION
I have reviewed the triage vital signs.  Const: AAOx3, uncomfortable appearing, but non-toxic  Eyes: no conjunctival injection  HENT: NCAT, Neck supple, oral mucosa moist  CV: RRR, +S1, S2  Resp: CTAB, no respiratory distress  GI: Abdomen soft, nondistended, +suprapubic TTP, no guarding, no CVA tenderness  Extremities: No peripheral edema,  2+ radial and DP pulses  Skin: Warm, well perfused, no rash  MSK: No gross deformities appreciated  Neuro: No focal sensory or motor deficits  Psych: Appropriate mood and affect I have reviewed the triage vital signs.  Const: AAOx3, uncomfortable appearing, but non-toxic  Eyes: no conjunctival injection  HENT: NCAT, Neck supple, oral mucosa moist  CV: RRR, +S1, S2  Resp: CTAB, no respiratory distress  GI: Abdomen soft, nondistended, +suprapubic TTP, no guarding, no CVA tenderness  : Pelvic exam chaperoned by YOUSIF Sofia. External genitalia unremarkable. Speculum exam with normal appearing whitish vaginal discharge. Vaginal wall mucosa unremarkable. Cervix closed in appearance with mild bleeding without lesions. Bimanual exam with L adnexal tenderness.  Extremities: No peripheral edema,  2+ radial and DP pulses  Skin: Warm, well perfused, no rash  MSK: No gross deformities appreciated  Neuro: No focal sensory or motor deficits  Psych: Appropriate mood and affect I have reviewed the triage vital signs.  Const: AAOx3, uncomfortable appearing, but non-toxic  Eyes: no conjunctival injection  HENT: NCAT, Neck supple, oral mucosa moist  CV: RRR, +S1, S2  Resp: CTAB, no respiratory distress  GI: Abdomen soft, nondistended, +suprapubic TTP, no guarding, no CVA tenderness  : Pelvic exam chaperoned by YOUSIF Sofia. External genitalia unremarkable. Speculum exam with normal appearing whitish vaginal discharge. Vaginal wall mucosa unremarkable. Cervix closed in appearance with mild bleeding without lesions or erythema. Bimanual exam with L adnexal tenderness.  Extremities: No peripheral edema,  2+ radial and DP pulses  Skin: Warm, well perfused, no rash  MSK: No gross deformities appreciated  Neuro: No focal sensory or motor deficits  Psych: Appropriate mood and affect

## 2020-03-02 NOTE — ED ADULT TRIAGE NOTE - CHIEF COMPLAINT QUOTE
Pt arrives with  Non English Sydni speaking. c/o too much burning pain in vaginal area especially when urinating...started last night. Pt appears very uncomfortable. + urinary frequency . As per  "same happen few months back went to [facility] treated for a bacterial infection...that time she had fevers."

## 2020-03-02 NOTE — ED ADULT NURSE NOTE - NSSUHOSCREENINGYN_ED_ALL_ED
Refill request is for a maintenance medication and has met the criteria specified in the Ambulatory Medication Refill Standing Order for eligibility, visits, laboratory, alerts and was sent to the requested pharmacy.     Requested Prescriptions     Signed P Yes - the patient is able to be screened

## 2020-03-02 NOTE — ED PROVIDER NOTE - NSFOLLOWUPINSTRUCTIONS_ED_ALL_ED_FT
You were seen in the ER for a urinary tract infection.    Follow up with your primary care doctor within 48 hours.    A prescription for Macrobid (antibiotics) was sent to your pharmacy. Take as directed on the packaging.    Return to the ER for any new or worsening symptoms or concerns, such as fever, chills, nausea/vomiting, flank pain, etc.

## 2020-03-02 NOTE — ED PROVIDER NOTE - OBJECTIVE STATEMENT
30F h/o multiple UTIs including ESBL E.coli presents with dysuria and urinary urgency x 2 days. Subjective fever today, but no n/v. Associated with malodorous, white vaginal discharge for past week. LMP 2 weeks ago.

## 2020-03-02 NOTE — ED ADULT NURSE REASSESSMENT NOTE - NS ED NURSE REASSESS COMMENT FT1
Patient resting comfortably at this time. Reports relief of pain after medication. MD Anaya at bedside. Awaiting further orders, will continue to monitor.

## 2020-03-02 NOTE — ED PROVIDER NOTE - CLINICAL SUMMARY MEDICAL DECISION MAKING FREE TEXT BOX
Héctor, PGY1 - 30F h/o multiple UTIs, including ESBL E.coli p/w dysuria and urinary urgency x 2 days. No systemic sx. Vitals WNL. UTI, low suspicion for pyelonephritis. Plan: abx, sx control Anaya, PGY1 - 30F h/o multiple UTIs, including ESBL E.coli p/w dysuria and urinary urgency x 2 days. No systemic sx. Vitals WNL. +Suprapubic TTP and L adnexal tenderness. UTI, low suspicion for pyelonephritis. Will evaluate for ovarian cyst, ovarian torsion, TOA. Plan: UA, UCx, TVUS, abx, sx control

## 2020-03-02 NOTE — ED PROVIDER NOTE - PATIENT PORTAL LINK FT
You can access the FollowMyHealth Patient Portal offered by [facility] by registering at the following website: [email or web address]. By joining [employer]’s FollowMyHealth portal, you will also be able to view your health information using other applications (apps) compatible with our system.

## 2020-03-02 NOTE — ED PROVIDER NOTE - NS ED ROS FT
General: +Subjective fever  Eyes: Denies vision changes  ENMT: Denies trouble swallowing or speaking, or sore throat  CV: Denies chest pain or palpitations  Resp: Denies cough or SOB  GI: +Lower abdominal pain. No nausea, vomiting, diarrhea, or constipation   :+Painful urination, urinary urgency. No blood in urine  Skin: Denies new rashes  Neuro: Denies headache, numbness, or weakness  MSK: Denies recent trauma

## 2020-03-02 NOTE — ED ADULT NURSE NOTE - OBJECTIVE STATEMENT
Patient received to room 9, A&Ox4, ambulatory, c/o painful urination for two days. Pt. states she has been having urinary frequency and burning with urination worsening over the past two days. Patient denies hematuria. States she had similar symptoms a few months ago and was treated at [facility] for a bacterial infection. Denies radiation of pain. Denies CP/SOB/HA, no fevers/chills, no nausea/vomiting. VS as noted. Medicated as per orders. Labs sent. MD at bedside. Awaiting further orders, will continue to monitor. Patient received to room 9, A&Ox4, ambulatory, c/o painful urination for two days. Pt. states she has been having urinary frequency and burning with urination worsening over the past two days. Patient denies hematuria. States she had similar symptoms a few months ago and was treated at [facility] for a bacterial infection. Abdomen soft, nontender, nondistended.  LMP 2 weeks ago, UCG negative today. Denies radiation of pain. Denies CP/SOB/HA, no fevers/chills, no nausea/vomiting. Breathing even and unlabored. Patient appears uncomfortable. VS as noted. Medicated as per orders. Labs sent. MD at bedside. Awaiting further orders, will continue to monitor.

## 2020-03-05 ENCOUNTER — INPATIENT (INPATIENT)
Facility: HOSPITAL | Age: 30
LOS: 2 days | Discharge: HOME | End: 2020-03-08
Attending: INTERNAL MEDICINE | Admitting: INTERNAL MEDICINE
Payer: COMMERCIAL

## 2020-03-05 VITALS
OXYGEN SATURATION: 100 % | RESPIRATION RATE: 16 BRPM | HEART RATE: 82 BPM | SYSTOLIC BLOOD PRESSURE: 112 MMHG | TEMPERATURE: 98 F | DIASTOLIC BLOOD PRESSURE: 64 MMHG

## 2020-03-05 LAB
-  AMIKACIN: SIGNIFICANT CHANGE UP
-  AMPICILLIN/SULBACTAM: SIGNIFICANT CHANGE UP
-  AMPICILLIN: SIGNIFICANT CHANGE UP
-  AZTREONAM: SIGNIFICANT CHANGE UP
-  CEFAZOLIN: SIGNIFICANT CHANGE UP
-  CEFEPIME: SIGNIFICANT CHANGE UP
-  CEFOXITIN: SIGNIFICANT CHANGE UP
-  CEFTAZIDIME: SIGNIFICANT CHANGE UP
-  CEFTRIAXONE: SIGNIFICANT CHANGE UP
-  ERTAPENEM: SIGNIFICANT CHANGE UP
-  GENTAMICIN: SIGNIFICANT CHANGE UP
-  IMIPENEM: SIGNIFICANT CHANGE UP
-  LEVOFLOXACIN: SIGNIFICANT CHANGE UP
-  MEROPENEM: SIGNIFICANT CHANGE UP
-  NITROFURANTOIN: SIGNIFICANT CHANGE UP
-  PIPERACILLIN/TAZOBACTAM: SIGNIFICANT CHANGE UP
-  TIGECYCLINE: SIGNIFICANT CHANGE UP
-  TOBRAMYCIN: SIGNIFICANT CHANGE UP
-  TRIMETHOPRIM/SULFAMETHOXAZOLE: SIGNIFICANT CHANGE UP
ALBUMIN SERPL ELPH-MCNC: 4.5 G/DL — SIGNIFICANT CHANGE UP (ref 3.3–5)
ALP SERPL-CCNC: 77 U/L — SIGNIFICANT CHANGE UP (ref 40–120)
ALT FLD-CCNC: 14 U/L — SIGNIFICANT CHANGE UP (ref 4–33)
ANION GAP SERPL CALC-SCNC: 14 MMO/L — SIGNIFICANT CHANGE UP (ref 7–14)
APPEARANCE UR: SIGNIFICANT CHANGE UP
AST SERPL-CCNC: 19 U/L — SIGNIFICANT CHANGE UP (ref 4–32)
BACTERIA # UR AUTO: SIGNIFICANT CHANGE UP
BACTERIA UR CULT: SIGNIFICANT CHANGE UP
BASOPHILS # BLD AUTO: 0.03 K/UL — SIGNIFICANT CHANGE UP (ref 0–0.2)
BASOPHILS NFR BLD AUTO: 0.3 % — SIGNIFICANT CHANGE UP (ref 0–2)
BILIRUB SERPL-MCNC: 0.2 MG/DL — SIGNIFICANT CHANGE UP (ref 0.2–1.2)
BILIRUB UR-MCNC: SIGNIFICANT CHANGE UP
BLOOD UR QL VISUAL: NEGATIVE — SIGNIFICANT CHANGE UP
BUN SERPL-MCNC: 13 MG/DL — SIGNIFICANT CHANGE UP (ref 7–23)
CALCIUM SERPL-MCNC: 10.1 MG/DL — SIGNIFICANT CHANGE UP (ref 8.4–10.5)
CHLORIDE SERPL-SCNC: 99 MMOL/L — SIGNIFICANT CHANGE UP (ref 98–107)
CO2 SERPL-SCNC: 25 MMOL/L — SIGNIFICANT CHANGE UP (ref 22–31)
COLOR SPEC: SIGNIFICANT CHANGE UP
CREAT SERPL-MCNC: 0.84 MG/DL — SIGNIFICANT CHANGE UP (ref 0.5–1.3)
EOSINOPHIL # BLD AUTO: 0.12 K/UL — SIGNIFICANT CHANGE UP (ref 0–0.5)
EOSINOPHIL NFR BLD AUTO: 1.1 % — SIGNIFICANT CHANGE UP (ref 0–6)
GLUCOSE SERPL-MCNC: 95 MG/DL — SIGNIFICANT CHANGE UP (ref 70–99)
GLUCOSE UR-MCNC: NEGATIVE — SIGNIFICANT CHANGE UP
HCT VFR BLD CALC: 38.4 % — SIGNIFICANT CHANGE UP (ref 34.5–45)
HGB BLD-MCNC: 12.4 G/DL — SIGNIFICANT CHANGE UP (ref 11.5–15.5)
HYALINE CASTS # UR AUTO: NEGATIVE — SIGNIFICANT CHANGE UP
IMM GRANULOCYTES NFR BLD AUTO: 0.5 % — SIGNIFICANT CHANGE UP (ref 0–1.5)
KETONES UR-MCNC: NEGATIVE — SIGNIFICANT CHANGE UP
LEUKOCYTE ESTERASE UR-ACNC: SIGNIFICANT CHANGE UP
LYMPHOCYTES # BLD AUTO: 3.69 K/UL — HIGH (ref 1–3.3)
LYMPHOCYTES # BLD AUTO: 33.5 % — SIGNIFICANT CHANGE UP (ref 13–44)
MCHC RBC-ENTMCNC: 28.2 PG — SIGNIFICANT CHANGE UP (ref 27–34)
MCHC RBC-ENTMCNC: 32.3 % — SIGNIFICANT CHANGE UP (ref 32–36)
MCV RBC AUTO: 87.3 FL — SIGNIFICANT CHANGE UP (ref 80–100)
METHOD TYPE: SIGNIFICANT CHANGE UP
MONOCYTES # BLD AUTO: 0.74 K/UL — SIGNIFICANT CHANGE UP (ref 0–0.9)
MONOCYTES NFR BLD AUTO: 6.7 % — SIGNIFICANT CHANGE UP (ref 2–14)
NEUTROPHILS # BLD AUTO: 6.38 K/UL — SIGNIFICANT CHANGE UP (ref 1.8–7.4)
NEUTROPHILS NFR BLD AUTO: 57.9 % — SIGNIFICANT CHANGE UP (ref 43–77)
NITRITE UR-MCNC: POSITIVE — HIGH
NRBC # FLD: 0 K/UL — SIGNIFICANT CHANGE UP (ref 0–0)
ORGANISM # SPEC MICROSCOPIC CNT: SIGNIFICANT CHANGE UP
PH UR: 7 — SIGNIFICANT CHANGE UP (ref 5–8)
PLATELET # BLD AUTO: 287 K/UL — SIGNIFICANT CHANGE UP (ref 150–400)
PMV BLD: 10.9 FL — SIGNIFICANT CHANGE UP (ref 7–13)
POTASSIUM SERPL-MCNC: 3.9 MMOL/L — SIGNIFICANT CHANGE UP (ref 3.5–5.3)
POTASSIUM SERPL-SCNC: 3.9 MMOL/L — SIGNIFICANT CHANGE UP (ref 3.5–5.3)
PROT SERPL-MCNC: 7.8 G/DL — SIGNIFICANT CHANGE UP (ref 6–8.3)
PROT UR-MCNC: NEGATIVE — SIGNIFICANT CHANGE UP
RBC # BLD: 4.4 M/UL — SIGNIFICANT CHANGE UP (ref 3.8–5.2)
RBC # FLD: 13.9 % — SIGNIFICANT CHANGE UP (ref 10.3–14.5)
RBC CASTS # UR COMP ASSIST: SIGNIFICANT CHANGE UP (ref 0–?)
SODIUM SERPL-SCNC: 138 MMOL/L — SIGNIFICANT CHANGE UP (ref 135–145)
SP GR SPEC: 1.01 — SIGNIFICANT CHANGE UP (ref 1–1.04)
SQUAMOUS # UR AUTO: SIGNIFICANT CHANGE UP
UROBILINOGEN FLD QL: SIGNIFICANT CHANGE UP
WBC # BLD: 11.01 K/UL — HIGH (ref 3.8–10.5)
WBC # FLD AUTO: 11.01 K/UL — HIGH (ref 3.8–10.5)
WBC UR QL: HIGH (ref 0–?)

## 2020-03-05 RX ORDER — SODIUM CHLORIDE 9 MG/ML
1000 INJECTION INTRAMUSCULAR; INTRAVENOUS; SUBCUTANEOUS ONCE
Refills: 0 | Status: COMPLETED | OUTPATIENT
Start: 2020-03-05 | End: 2020-03-05

## 2020-03-05 RX ADMIN — SODIUM CHLORIDE 1000 MILLILITER(S): 9 INJECTION INTRAMUSCULAR; INTRAVENOUS; SUBCUTANEOUS at 22:05

## 2020-03-05 NOTE — ED POST DISCHARGE NOTE - DETAILS
[phone] - As per pt , pt c/o dysuria, hematuria and fever.  Pt has a follow up appt with urologist scheduled for Monday as first time appt. Discussed with  pt should return to ED today to be re-evaluated.  Pt will return to ED.

## 2020-03-05 NOTE — ED PROVIDER NOTE - OBJECTIVE STATEMENT
Patient is a 30F h/o multiple UTIs including ESBL E.coli presents with dysuria and urinary urgency x 2 days. Recalled to the ED for positive Urine culture of ESBL. patient is currently taking Macrobid. She endorses subjective fever, lower back pain., but no n/v.

## 2020-03-05 NOTE — ED PROVIDER NOTE - ATTENDING CONTRIBUTION TO CARE
Pt with UTI d/c on macrobid, called back for ucx growing ESBL e.coli, EVER 32 for macrobid, pt still not feeling well, will admit to medicine for IV abx.

## 2020-03-05 NOTE — ED PROVIDER NOTE - CLINICAL SUMMARY MEDICAL DECISION MAKING FREE TEXT BOX
30F h/o multiple UTIs including ESBL, recalled for urine culture for ESBL. plan for routine lab, including blood and urine cx, patient will be admitted for IV abx

## 2020-03-05 NOTE — ED ADULT NURSE NOTE - OBJECTIVE STATEMENT
came into er stated that she has had lower abd pain with vomiting for the last three days; no diarrhea just pain; stated had some vomiting yesterday but none today and has been urinating and did have regular bm today

## 2020-03-05 NOTE — ED ADULT NURSE NOTE - NSIMPLEMENTINTERV_GEN_ALL_ED
Implemented All Universal Safety Interventions:  [place] to call system. Call bell, personal items and telephone within reach. Instruct patient to call for assistance. Room bathroom lighting operational. Non-slip footwear when patient is off stretcher. Physically safe environment: no spills, clutter or unnecessary equipment. Stretcher in lowest position, wheels locked, appropriate side rails in place.

## 2020-03-06 DIAGNOSIS — Z29.9 ENCOUNTER FOR PROPHYLACTIC MEASURES, UNSPECIFIED: ICD-10-CM

## 2020-03-06 DIAGNOSIS — A49.9 BACTERIAL INFECTION, UNSPECIFIED: ICD-10-CM

## 2020-03-06 DIAGNOSIS — N39.0 URINARY TRACT INFECTION, SITE NOT SPECIFIED: ICD-10-CM

## 2020-03-06 DIAGNOSIS — Z02.9 ENCOUNTER FOR ADMINISTRATIVE EXAMINATIONS, UNSPECIFIED: ICD-10-CM

## 2020-03-06 LAB
ANION GAP SERPL CALC-SCNC: 11 MMO/L — SIGNIFICANT CHANGE UP (ref 7–14)
BASOPHILS # BLD AUTO: 0.04 K/UL — SIGNIFICANT CHANGE UP (ref 0–0.2)
BASOPHILS NFR BLD AUTO: 0.3 % — SIGNIFICANT CHANGE UP (ref 0–2)
BUN SERPL-MCNC: 16 MG/DL — SIGNIFICANT CHANGE UP (ref 7–23)
CALCIUM SERPL-MCNC: 9.5 MG/DL — SIGNIFICANT CHANGE UP (ref 8.4–10.5)
CHLORIDE SERPL-SCNC: 100 MMOL/L — SIGNIFICANT CHANGE UP (ref 98–107)
CHOLEST SERPL-MCNC: 190 MG/DL — SIGNIFICANT CHANGE UP (ref 120–199)
CO2 SERPL-SCNC: 25 MMOL/L — SIGNIFICANT CHANGE UP (ref 22–31)
CREAT SERPL-MCNC: 0.84 MG/DL — SIGNIFICANT CHANGE UP (ref 0.5–1.3)
EOSINOPHIL # BLD AUTO: 0.12 K/UL — SIGNIFICANT CHANGE UP (ref 0–0.5)
EOSINOPHIL NFR BLD AUTO: 1 % — SIGNIFICANT CHANGE UP (ref 0–6)
GLUCOSE SERPL-MCNC: 102 MG/DL — HIGH (ref 70–99)
HBA1C BLD-MCNC: 5.5 % — SIGNIFICANT CHANGE UP (ref 4–5.6)
HCT VFR BLD CALC: 38.3 % — SIGNIFICANT CHANGE UP (ref 34.5–45)
HDLC SERPL-MCNC: 47 MG/DL — SIGNIFICANT CHANGE UP (ref 45–65)
HGB BLD-MCNC: 12.5 G/DL — SIGNIFICANT CHANGE UP (ref 11.5–15.5)
IMM GRANULOCYTES NFR BLD AUTO: 0.4 % — SIGNIFICANT CHANGE UP (ref 0–1.5)
LIPID PNL WITH DIRECT LDL SERPL: 124 MG/DL — SIGNIFICANT CHANGE UP
LYMPHOCYTES # BLD AUTO: 35.4 % — SIGNIFICANT CHANGE UP (ref 13–44)
LYMPHOCYTES # BLD AUTO: 4.07 K/UL — HIGH (ref 1–3.3)
MAGNESIUM SERPL-MCNC: 2 MG/DL — SIGNIFICANT CHANGE UP (ref 1.6–2.6)
MCHC RBC-ENTMCNC: 28.6 PG — SIGNIFICANT CHANGE UP (ref 27–34)
MCHC RBC-ENTMCNC: 32.6 % — SIGNIFICANT CHANGE UP (ref 32–36)
MCV RBC AUTO: 87.6 FL — SIGNIFICANT CHANGE UP (ref 80–100)
MONOCYTES # BLD AUTO: 1.03 K/UL — HIGH (ref 0–0.9)
MONOCYTES NFR BLD AUTO: 9 % — SIGNIFICANT CHANGE UP (ref 2–14)
NEUTROPHILS # BLD AUTO: 6.18 K/UL — SIGNIFICANT CHANGE UP (ref 1.8–7.4)
NEUTROPHILS NFR BLD AUTO: 53.9 % — SIGNIFICANT CHANGE UP (ref 43–77)
NRBC # FLD: 0 K/UL — SIGNIFICANT CHANGE UP (ref 0–0)
PHOSPHATE SERPL-MCNC: 3.4 MG/DL — SIGNIFICANT CHANGE UP (ref 2.5–4.5)
PLATELET # BLD AUTO: 286 K/UL — SIGNIFICANT CHANGE UP (ref 150–400)
PMV BLD: 10.6 FL — SIGNIFICANT CHANGE UP (ref 7–13)
POTASSIUM SERPL-MCNC: 3.8 MMOL/L — SIGNIFICANT CHANGE UP (ref 3.5–5.3)
POTASSIUM SERPL-SCNC: 3.8 MMOL/L — SIGNIFICANT CHANGE UP (ref 3.5–5.3)
RBC # BLD: 4.37 M/UL — SIGNIFICANT CHANGE UP (ref 3.8–5.2)
RBC # FLD: 14 % — SIGNIFICANT CHANGE UP (ref 10.3–14.5)
SODIUM SERPL-SCNC: 136 MMOL/L — SIGNIFICANT CHANGE UP (ref 135–145)
TRIGL SERPL-MCNC: 201 MG/DL — HIGH (ref 10–149)
TSH SERPL-MCNC: 5.25 UIU/ML — HIGH (ref 0.27–4.2)
WBC # BLD: 11.49 K/UL — HIGH (ref 3.8–10.5)
WBC # FLD AUTO: 11.49 K/UL — HIGH (ref 3.8–10.5)

## 2020-03-06 PROCEDURE — 76770 US EXAM ABDO BACK WALL COMP: CPT | Mod: 26

## 2020-03-06 PROCEDURE — 99223 1ST HOSP IP/OBS HIGH 75: CPT

## 2020-03-06 PROCEDURE — 99222 1ST HOSP IP/OBS MODERATE 55: CPT

## 2020-03-06 PROCEDURE — 93010 ELECTROCARDIOGRAM REPORT: CPT | Mod: 76

## 2020-03-06 PROCEDURE — 12345: CPT | Mod: NC

## 2020-03-06 RX ORDER — ERTAPENEM SODIUM 1 G/1
1000 INJECTION, POWDER, LYOPHILIZED, FOR SOLUTION INTRAMUSCULAR; INTRAVENOUS ONCE
Refills: 0 | Status: COMPLETED | OUTPATIENT
Start: 2020-03-06 | End: 2020-03-06

## 2020-03-06 RX ORDER — SODIUM CHLORIDE 9 MG/ML
3 INJECTION INTRAMUSCULAR; INTRAVENOUS; SUBCUTANEOUS EVERY 8 HOURS
Refills: 0 | Status: DISCONTINUED | OUTPATIENT
Start: 2020-03-06 | End: 2020-03-08

## 2020-03-06 RX ORDER — SODIUM CHLORIDE 9 MG/ML
1000 INJECTION INTRAMUSCULAR; INTRAVENOUS; SUBCUTANEOUS
Refills: 0 | Status: DISCONTINUED | OUTPATIENT
Start: 2020-03-06 | End: 2020-03-07

## 2020-03-06 RX ORDER — ERTAPENEM SODIUM 1 G/1
1000 INJECTION, POWDER, LYOPHILIZED, FOR SOLUTION INTRAMUSCULAR; INTRAVENOUS EVERY 24 HOURS
Refills: 0 | Status: DISCONTINUED | OUTPATIENT
Start: 2020-03-07 | End: 2020-03-08

## 2020-03-06 RX ORDER — ERTAPENEM SODIUM 1 G/1
INJECTION, POWDER, LYOPHILIZED, FOR SOLUTION INTRAMUSCULAR; INTRAVENOUS
Refills: 0 | Status: DISCONTINUED | OUTPATIENT
Start: 2020-03-06 | End: 2020-03-08

## 2020-03-06 RX ORDER — LACTOBACILLUS ACIDOPHILUS 100MM CELL
1 CAPSULE ORAL
Refills: 0 | Status: DISCONTINUED | OUTPATIENT
Start: 2020-03-06 | End: 2020-03-08

## 2020-03-06 RX ORDER — ERTAPENEM SODIUM 1 G/1
1000 INJECTION, POWDER, LYOPHILIZED, FOR SOLUTION INTRAMUSCULAR; INTRAVENOUS EVERY 24 HOURS
Refills: 0 | Status: DISCONTINUED | OUTPATIENT
Start: 2020-03-06 | End: 2020-03-06

## 2020-03-06 RX ADMIN — SODIUM CHLORIDE 3 MILLILITER(S): 9 INJECTION INTRAMUSCULAR; INTRAVENOUS; SUBCUTANEOUS at 21:54

## 2020-03-06 RX ADMIN — Medication 1 TABLET(S): at 17:10

## 2020-03-06 RX ADMIN — SODIUM CHLORIDE 3 MILLILITER(S): 9 INJECTION INTRAMUSCULAR; INTRAVENOUS; SUBCUTANEOUS at 14:03

## 2020-03-06 RX ADMIN — ERTAPENEM SODIUM 120 MILLIGRAM(S): 1 INJECTION, POWDER, LYOPHILIZED, FOR SOLUTION INTRAMUSCULAR; INTRAVENOUS at 04:18

## 2020-03-06 RX ADMIN — Medication 1 TABLET(S): at 05:21

## 2020-03-06 RX ADMIN — SODIUM CHLORIDE 100 MILLILITER(S): 9 INJECTION INTRAMUSCULAR; INTRAVENOUS; SUBCUTANEOUS at 21:52

## 2020-03-06 RX ADMIN — SODIUM CHLORIDE 100 MILLILITER(S): 9 INJECTION INTRAMUSCULAR; INTRAVENOUS; SUBCUTANEOUS at 04:18

## 2020-03-06 RX ADMIN — SODIUM CHLORIDE 3 MILLILITER(S): 9 INJECTION INTRAMUSCULAR; INTRAVENOUS; SUBCUTANEOUS at 05:23

## 2020-03-06 NOTE — PROGRESS NOTE ADULT - SUBJECTIVE AND OBJECTIVE BOX
Patient is a 30y old  Female who presents with a chief complaint of UTI, ESBL , (06 Mar 2020 01:38)      SUBJECTIVE / OVERNIGHT EVENTS: Pt seen and examined, chart rviewed. no complaints at this time    MEDICATIONS  (STANDING):  ertapenem  IVPB      lactobacillus acidophilus 1 Tablet(s) Oral two times a day  sodium chloride 0.9% lock flush 3 milliLiter(s) IV Push every 8 hours  sodium chloride 0.9%. 1000 milliLiter(s) (100 mL/Hr) IV Continuous <Continuous>    MEDICATIONS  (PRN):      Vital Signs Last 24 Hrs  T(C): 36.4 (06 Mar 2020 11:54), Max: 37.1 (05 Mar 2020 21:59)  T(F): 97.6 (06 Mar 2020 11:54), Max: 98.8 (05 Mar 2020 21:59)  HR: 75 (06 Mar 2020 11:54) (9 - 82)  BP: 115/72 (06 Mar 2020 11:54) (99/62 - 115/72)  BP(mean): --  RR: 19 (06 Mar 2020 11:54) (14 - 19)  SpO2: 100% (06 Mar 2020 11:54) (100% - 100%)  CAPILLARY BLOOD GLUCOSE        I&O's Summary      PHYSICAL EXAM:  GENERAL: NAD, well-developed  HEAD:  Atraumatic, Normocephalic  EYES: EOMI, PERRLA, conjunctiva and sclera clear  NECK: Supple, No JVD  CHEST/LUNG: Clear to auscultation bilaterally; No wheeze  HEART: Regular rate and rhythm; No murmurs, rubs, or gallops  ABDOMEN: Soft, Nontender, Nondistended; Bowel sounds present  EXTREMITIES:  2+ Peripheral Pulses, No clubbing, cyanosis, or edema  PSYCH: AAOx3  NEUROLOGY: non-focal  SKIN: No rashes or lesions    LABS:                        12.5   11.49 )-----------( 286      ( 06 Mar 2020 05:05 )             38.3     03-06    136  |  100  |  16  ----------------------------<  102<H>  3.8   |  25  |  0.84    Ca    9.5      06 Mar 2020 05:05  Phos  3.4     03-06  Mg     2.0     03-06    TPro  7.8  /  Alb  4.5  /  TBili  0.2  /  DBili  x   /  AST  19  /  ALT  14  /  AlkPhos  77  03-05          Urinalysis Basic - ( 05 Mar 2020 21:49 )    Color: DARK YELLOW / Appearance: Lt TURBID / S.011 / pH: 7.0  Gluc: NEGATIVE / Ketone: NEGATIVE  / Bili: TRACE / Urobili: TRACE   Blood: NEGATIVE / Protein: NEGATIVE / Nitrite: POSITIVE   Leuk Esterase: SMALL / RBC: 0-2 / WBC 11-25   Sq Epi: MODERATE / Non Sq Epi: x / Bacteria: FEW        RADIOLOGY & ADDITIONAL TESTS:    Imaging Personally Reviewed:    Consultant(s) Notes Reviewed:      Care Discussed with Consultants/Other Providers:

## 2020-03-06 NOTE — H&P ADULT - PROBLEM SELECTOR PLAN 2
Patient with no significant medical history. Will hold off on anticoagulation. Patient placed on Venodynes.

## 2020-03-06 NOTE — PROGRESS NOTE ADULT - ASSESSMENT
29 y/o female with PMH of multiple UTI's presents to the ED after being recalled because her urine culture grew ESBL.

## 2020-03-06 NOTE — H&P ADULT - NEGATIVE OPHTHALMOLOGIC SYMPTOMS
no photophobia/no blurred vision L/no blurred vision R no diplopia/no photophobia/no blurred vision L/no blurred vision R

## 2020-03-06 NOTE — H&P ADULT - NSHPLABSRESULTS_GEN_ALL_CORE
Vital Signs Last 24 Hrs  T(C): 36.8 (06 Mar 2020 01:30), Max: 37.1 (05 Mar 2020 21:59)  T(F): 98.3 (06 Mar 2020 01:30), Max: 98.8 (05 Mar 2020 21:59)  HR: 9 (06 Mar 2020 01:30) (9 - 82)  BP: 109/76 (06 Mar 2020 01:30) (99/62 - 112/64)  BP(mean): --  RR: 14 (06 Mar 2020 01:30) (14 - 18)  SpO2: 100% (06 Mar 2020 01:30) (100% - 100%)                          12.4   11.01 )-----------( 287      ( 05 Mar 2020 21:49 )             38.4     03-05    138  |  99  |  13  ----------------------------<  95  3.9   |  25  |  0.84    Ca    10.1      05 Mar 2020 21:49    TPro  7.8  /  Alb  4.5  /  TBili  0.2  /  DBili  x   /  AST  19  /  ALT  14  /  AlkPhos  77  03-05    Urinalysis Basic - ( 05 Mar 2020 21:49 )    Color: DARK YELLOW / Appearance: Lt TURBID / S.011 / pH: 7.0  Gluc: NEGATIVE / Ketone: NEGATIVE  / Bili: TRACE / Urobili: TRACE   Blood: NEGATIVE / Protein: NEGATIVE / Nitrite: POSITIVE   Leuk Esterase: SMALL / RBC: 0-2 / WBC 11-25   Sq Epi: MODERATE / Non Sq Epi: x / Bacteria: FEW    POCT Urine pregnancy test: Negative.    Transvaginal US: No sonographic evidence of ovarian torsion.    : Urine culture: ESBL   Chlamydia Amplification result: not detected.  GC Amplification result: Not detected.  20 HIV 1/2 Combo result: Non reactive.

## 2020-03-06 NOTE — H&P ADULT - NSHPSOCIALHISTORY_GEN_ALL_CORE
Patient works as [employer]. Patient denies use of cigarettes and states she drinks occasionally.

## 2020-03-06 NOTE — H&P ADULT - ATTENDING COMMENTS
29 y/o female HX of Multiple UTI, recent ER visit for UTI, pt was called back  to ER for + Urine Culture ESBL, + Blood in urine, no N/V, NO CP, NO SOB, + Urinary frequency, LMP 2 weeks ago, + B/L CVA tenderness,    ID consult, Bacid, Renal sonogram, IV Ertapenem,  Venodyne for DVT Prophylaxis, F/U CBC, CMP, IVF NS @ 100 cc/hr x 24 HR,     Case D/W Pt, ADS, Pt's family  ,    Pt was seen by me, Dr. DHARMESH Plascencia on 3/6/2020,

## 2020-03-06 NOTE — H&P ADULT - NEGATIVE ENMT SYMPTOMS
no hearing difficulty/no vertigo/no ear pain/no tinnitus no vertigo/no throat pain/no dysphagia/no nose bleeds/no hearing difficulty/no ear pain/no tinnitus

## 2020-03-06 NOTE — H&P ADULT - PROBLEM SELECTOR PLAN 1
Admit to medicine. POCT Urine pregnancy test negative. Urine culture from 3/2 growing ESBL. Start Ertapenem for infection. Admit to medicine. POCT Urine pregnancy test negative. Urine culture from 3/2 growing ESBL. Start Ertapenem for infection. Consider ID consult in AM. Admit to medicine. POCT Urine pregnancy test negative. Urine culture from 3/2 growing ESBL. Start Ertapenem for infection. Consider ID consult in AM. Renal US ordered. Patient placed on Normal saline at 100cc/hr for 24 hours. Probiotic BID.

## 2020-03-06 NOTE — H&P ADULT - NEGATIVE NEUROLOGICAL SYMPTOMS
no weakness/no vertigo/no tremors/no syncope no syncope/no vertigo/no headache/no weakness/no tremors

## 2020-03-06 NOTE — H&P ADULT - RS GEN PE MLT RESP DETAILS PC
breath sounds equal/clear to auscultation bilaterally clear to auscultation bilaterally/good air movement/respirations non-labored/breath sounds equal

## 2020-03-06 NOTE — CONSULT NOTE ADULT - SUBJECTIVE AND OBJECTIVE BOX
HPI:  31 y/o female with PMH of multiple UTI's presents to the ED after being recalled because her urine culture grew ESBL. Patient states that she has been having dysuria for the last  3-4 days and endorses subjective fever.  Patient also states that she noticed blood in her urine this morning. Patient also complains of lower back pain specifically in bilateral flank areas that is non-radiating and endorses urinary frequency. Patient was taking Macrobid but still feels unwell. LMP 2 weeks ago. Patient denies nausea, vomiting, shortness of breath, chest pain. (06 Mar 2020 01:38)    ID- in addition had UTI 1 year ago.     PAST MEDICAL & SURGICAL HISTORY:  UTI (urinary tract infection)  Yeast infection of the vagina  No significant past surgical history      Allergies    No Known Allergies    Intolerances        ANTIMICROBIALS:  ertapenem  IVPB        OTHER MEDS:  lactobacillus acidophilus 1 Tablet(s) Oral two times a day  sodium chloride 0.9% lock flush 3 milliLiter(s) IV Push every 8 hours  sodium chloride 0.9%. 1000 milliLiter(s) IV Continuous <Continuous>      SOCIAL HISTORY: works as home health aide    FAMILY HISTORY:  FH: bladder cancer: Mother      REVIEW OF SYSTEMS  [  ] ROS unobtainable because:    [x  ] All other systems negative except as noted below:	    Constitutional:  [ ] fever [ ] chills  [ ] weight loss  [ ] weakness  Skin:  [ ] rash [ ] phlebitis	  Eyes: [ ] icterus [ ] pain  [ ] discharge	  ENMT: [ ] sore throat  [ ] thrush [ ] ulcers [ ] exudates  Respiratory: [ ] dyspnea [ ] hemoptysis [ ] cough [ ] sputum	  Cardiovascular:  [ ] chest pain [ ] palpitations [ ] edema	  Gastrointestinal:  [ ] nausea [ ] vomiting [ ] diarrhea [ ] constipation [ ] pain	  Genitourinary:  [x ] dysuria [ ] frequency [x ] hematuria [ ] discharge [x ] flank pain  [ ] incontinence  Musculoskeletal:  [ ] myalgias [ ] arthralgias [ ] arthritis  [ ] back pain  Neurological:  [ ] headache [ ] seizures  [ ] confusion/altered mental status  Psychiatric:  [ ] anxiety [ ] depression	  Hematology/Lymphatics:  [ ] lymphadenopathy  Endocrine:  [ ] adrenal [ ] thyroid  Allergic/Immunologic:	 [ ] transplant [ ] seasonal    PHYSICAL EXAM:  General: [x ] non-toxic  HEAD/EYES: [ ] PERRL [x ] white sclera [ ] icterus  ENT:  [ ] normal [x ] supple [ ] thrush [ ] pharyngeal exudate  Cardiovascular:   [ ] murmur [x ] normal [ ] PPM/AICD  Respiratory:  [x ] clear to ausculation bilaterally  GI:  [x ] soft, non-tender, normal bowel sounds  :  [ ] jewell [ ] tender suprapubic and lower back on left  Musculoskeletal:  [x ] no synovitis  Neurologic:  [x ] non-focal exam   Skin:  [x ] no rash  Lymph: [ ] no lymphadenopathy  Psychiatric:  [x ] appropriate affect [x ] alert & oriented  Lines:  [x ] no phlebitis [ ] central line          Drug Dosing Weight  Height (cm): 157.48 (2019 21:13)  Weight (kg): 65.3 (2019 21:13)  BMI (kg/m2): 26.3 (2019 21:13)  BSA (m2): 1.66 (2019 21:13)    Vital Signs Last 24 Hrs  T(F): 98 (20 @ 14:45), Max: 98.8 (20 @ 21:59)    Vital Signs Last 24 Hrs  HR: 75 (20 @ 14:45) (9 - 82)  BP: 107/69 (20 @ 14:45) (99/62 - 115/72)  RR: 18 (20 @ 14:45)  SpO2: 100% (20 @ 14:45) (100% - 100%)  Wt(kg): --                          12.5   11.49 )-----------( 286      ( 06 Mar 2020 05:05 )             38.3       03-06    136  |  100  |  16  ----------------------------<  102<H>  3.8   |  25  |  0.84    Ca    9.5      06 Mar 2020 05:05  Phos  3.4     03-06  Mg     2.0     03-06    TPro  7.8  /  Alb  4.5  /  TBili  0.2  /  DBili  x   /  AST  19  /  ALT  14  /  AlkPhos  77  03-05      Urinalysis Basic - ( 05 Mar 2020 21:49 )    Color: DARK YELLOW / Appearance: Lt TURBID / S.011 / pH: 7.0  Gluc: NEGATIVE / Ketone: NEGATIVE  / Bili: TRACE / Urobili: TRACE   Blood: NEGATIVE / Protein: NEGATIVE / Nitrite: POSITIVE   Leuk Esterase: SMALL / RBC: 0-2 / WBC 11-25   Sq Epi: MODERATE / Non Sq Epi: x / Bacteria: FEW        MICROBIOLOGY:  URINE MIDSTREAM  20 --  --  E.COLI ESBL POSITIVE    blood culture 3/5 x 2 testing      RADIOLOGY:    < from: US Kidney and Bladder (20 @ 08:25) >    INTERPRETATION:  CLINICAL INFORMATION: Multiple UTIs, bilateral flank pain.    COMPARISON: CT 3/15/2019    TECHNIQUE: Sonography of the kidneys and bladder.     FINDINGS:    Right kidney:  9.5 cm. No renal mass, hydronephrosis or calculi.    Left kidney:  10 cm. No renal mass, hydronephrosis or calculi.    Urinary bladder: Within normal limits.    IMPRESSION:     Normal renal ultrasound.    < end of copied text >

## 2020-03-06 NOTE — CONSULT NOTE ADULT - ASSESSMENT
29 yo woman with h/o UTIs presenting with dysuria, suprapubic pain     ESBL E coli UTI.    Blood cultures testing.    Renal ultrasound normal.    Suggest;  -follow blood cultures  -agree with ertapenem 1 gm iv q 24 h  -if blood cultures negative x 48 h and afebrile with clinical improvement may transition to bactrim DS 1 tab BID to complete 5-7 day course.    ID service available for questions over weekend.    Faith Rajan MD  Pager: [phone]  After 5 PM or weekends please call fellow on call or office [phone]

## 2020-03-06 NOTE — H&P ADULT - PROBLEM SELECTOR PLAN 3
Transitions of Care Status:  1.  Name of PCP: Dr. Mitchell.  2.  PCP Contacted on Admission: [ ] Y    [X] N    3.  PCP contacted at Discharge: [ ] Y    [ ] N    [ ] N/A  4.  Post-Discharge Appointment Date and Location:  5.  Summary of Handoff given to PCP:

## 2020-03-06 NOTE — PROGRESS NOTE ADULT - PROBLEM SELECTOR PLAN 1
Admit to medicine. POCT Urine pregnancy test negative. Urine culture from 3/2 growing ESBL. Start Ertapenem for infection.   - ID consult, will f/u rec  - Renal US ordered. Patient placed on Normal saline at 100cc/hr for 24 hours. Probiotic BID.

## 2020-03-06 NOTE — H&P ADULT - ASSESSMENT
31 y/o female with PMH of multiple UTI's presents to the ED after being recalled because her urine culture grew ESBL.

## 2020-03-06 NOTE — H&P ADULT - HISTORY OF PRESENT ILLNESS
29 y/o female with PMH of multiple UTI's presents to the ED after being recalled because her urine culture grew ESBL. Patient states that she has been having dysuria for the last  3-4 days and endorses subjective fever.  Patient also states that she noticed blood in her urine this morning. Patient also complains of lower back pain specifically in bilateral flank areas. Patient denies nausea, vomiting, shortness of breath, chest pain. 29 y/o female with PMH of multiple UTI's presents to the ED after being recalled because her urine culture grew ESBL. Patient states that she has been having dysuria for the last  3-4 days and endorses subjective fever.  Patient also states that she noticed blood in her urine this morning. Patient also complains of lower back pain specifically in bilateral flank areas. LMP 2 weeks ago. Patient denies nausea, vomiting, shortness of breath, chest pain. 29 y/o female with PMH of multiple UTI's presents to the ED after being recalled because her urine culture grew ESBL. Patient states that she has been having dysuria for the last  3-4 days and endorses subjective fever.  Patient also states that she noticed blood in her urine this morning. Patient also complains of lower back pain specifically in bilateral flank areas that is non-radiating. Patient was taking Macrobid but still feels unwell. LMP 2 weeks ago. Patient denies nausea, vomiting, shortness of breath, chest pain. 29 y/o female with PMH of multiple UTI's presents to the ED after being recalled because her urine culture grew ESBL. Patient states that she has been having dysuria for the last  3-4 days and endorses subjective fever.  Patient also states that she noticed blood in her urine this morning. Patient also complains of lower back pain specifically in bilateral flank areas that is non-radiating and endorses urinary frequency. Patient was taking Macrobid but still feels unwell. LMP 2 weeks ago. Patient denies nausea, vomiting, shortness of breath, chest pain.

## 2020-03-07 DIAGNOSIS — I95.9 HYPOTENSION, UNSPECIFIED: ICD-10-CM

## 2020-03-07 LAB
ANION GAP SERPL CALC-SCNC: 12 MMO/L — SIGNIFICANT CHANGE UP (ref 7–14)
BUN SERPL-MCNC: 12 MG/DL — SIGNIFICANT CHANGE UP (ref 7–23)
CALCIUM SERPL-MCNC: 8.7 MG/DL — SIGNIFICANT CHANGE UP (ref 8.4–10.5)
CHLORIDE SERPL-SCNC: 103 MMOL/L — SIGNIFICANT CHANGE UP (ref 98–107)
CO2 SERPL-SCNC: 20 MMOL/L — LOW (ref 22–31)
CREAT SERPL-MCNC: 0.69 MG/DL — SIGNIFICANT CHANGE UP (ref 0.5–1.3)
CULTURE RESULTS: SIGNIFICANT CHANGE UP
GLUCOSE SERPL-MCNC: 93 MG/DL — SIGNIFICANT CHANGE UP (ref 70–99)
HCT VFR BLD CALC: 35.3 % — SIGNIFICANT CHANGE UP (ref 34.5–45)
HGB BLD-MCNC: 11.1 G/DL — LOW (ref 11.5–15.5)
MAGNESIUM SERPL-MCNC: 2 MG/DL — SIGNIFICANT CHANGE UP (ref 1.6–2.6)
MCHC RBC-ENTMCNC: 28 PG — SIGNIFICANT CHANGE UP (ref 27–34)
MCHC RBC-ENTMCNC: 31.4 % — LOW (ref 32–36)
MCV RBC AUTO: 89.1 FL — SIGNIFICANT CHANGE UP (ref 80–100)
NRBC # FLD: 0 K/UL — SIGNIFICANT CHANGE UP (ref 0–0)
PHOSPHATE SERPL-MCNC: 3.7 MG/DL — SIGNIFICANT CHANGE UP (ref 2.5–4.5)
PLATELET # BLD AUTO: 255 K/UL — SIGNIFICANT CHANGE UP (ref 150–400)
PMV BLD: 10.8 FL — SIGNIFICANT CHANGE UP (ref 7–13)
POTASSIUM SERPL-MCNC: 3.8 MMOL/L — SIGNIFICANT CHANGE UP (ref 3.5–5.3)
POTASSIUM SERPL-SCNC: 3.8 MMOL/L — SIGNIFICANT CHANGE UP (ref 3.5–5.3)
RBC # BLD: 3.96 M/UL — SIGNIFICANT CHANGE UP (ref 3.8–5.2)
RBC # FLD: 14.1 % — SIGNIFICANT CHANGE UP (ref 10.3–14.5)
SODIUM SERPL-SCNC: 135 MMOL/L — SIGNIFICANT CHANGE UP (ref 135–145)
SPECIMEN SOURCE: SIGNIFICANT CHANGE UP
WBC # BLD: 9.97 K/UL — SIGNIFICANT CHANGE UP (ref 3.8–10.5)
WBC # FLD AUTO: 9.97 K/UL — SIGNIFICANT CHANGE UP (ref 3.8–10.5)

## 2020-03-07 PROCEDURE — 99233 SBSQ HOSP IP/OBS HIGH 50: CPT

## 2020-03-07 RX ORDER — SODIUM CHLORIDE 9 MG/ML
1000 INJECTION INTRAMUSCULAR; INTRAVENOUS; SUBCUTANEOUS
Refills: 0 | Status: DISCONTINUED | OUTPATIENT
Start: 2020-03-07 | End: 2020-03-08

## 2020-03-07 RX ORDER — SODIUM CHLORIDE 9 MG/ML
250 INJECTION INTRAMUSCULAR; INTRAVENOUS; SUBCUTANEOUS ONCE
Refills: 0 | Status: COMPLETED | OUTPATIENT
Start: 2020-03-07 | End: 2020-03-07

## 2020-03-07 RX ADMIN — SODIUM CHLORIDE 3 MILLILITER(S): 9 INJECTION INTRAMUSCULAR; INTRAVENOUS; SUBCUTANEOUS at 21:32

## 2020-03-07 RX ADMIN — ERTAPENEM SODIUM 120 MILLIGRAM(S): 1 INJECTION, POWDER, LYOPHILIZED, FOR SOLUTION INTRAMUSCULAR; INTRAVENOUS at 02:00

## 2020-03-07 RX ADMIN — Medication 1 TABLET(S): at 05:42

## 2020-03-07 RX ADMIN — SODIUM CHLORIDE 100 MILLILITER(S): 9 INJECTION INTRAMUSCULAR; INTRAVENOUS; SUBCUTANEOUS at 05:43

## 2020-03-07 RX ADMIN — SODIUM CHLORIDE 500 MILLILITER(S): 9 INJECTION INTRAMUSCULAR; INTRAVENOUS; SUBCUTANEOUS at 05:40

## 2020-03-07 RX ADMIN — SODIUM CHLORIDE 3 MILLILITER(S): 9 INJECTION INTRAMUSCULAR; INTRAVENOUS; SUBCUTANEOUS at 05:43

## 2020-03-07 RX ADMIN — SODIUM CHLORIDE 100 MILLILITER(S): 9 INJECTION INTRAMUSCULAR; INTRAVENOUS; SUBCUTANEOUS at 23:07

## 2020-03-07 RX ADMIN — Medication 1 TABLET(S): at 17:25

## 2020-03-07 RX ADMIN — SODIUM CHLORIDE 3 MILLILITER(S): 9 INJECTION INTRAMUSCULAR; INTRAVENOUS; SUBCUTANEOUS at 14:20

## 2020-03-07 NOTE — PROGRESS NOTE ADULT - PROBLEM SELECTOR PLAN 1
Urine culture from 3/2 growing ESBL on Ertapenem, 3/6 Bcx NGTD  3/5 Ucx: nml  vishal  Renal US negative  Per ID, if Bcx remain neg for 48 hours, can DC with Bactrim to complete 5-7 days

## 2020-03-07 NOTE — CHART NOTE - NSCHARTNOTEFT_GEN_A_CORE
Notified by RN that patient c/o of heart racing. Patient  seen and examined at bedside. Patient denies chest pain, shortness fo breath. Patient endorses that she is feeling "nervous" EKG done - Sinus Rhythm. Will continue to monitor.

## 2020-03-07 NOTE — PROGRESS NOTE ADULT - SUBJECTIVE AND OBJECTIVE BOX
[facility] Division of Hospital Medicine  Blanca Castro DO  Pager (CRISTHIANF, 8A-5P): [phone]      Patient is a 30y old  Female who presents with a chief complaint of UTI, ESBL , (06 Mar 2020 18:28)      SUBJECTIVE / OVERNIGHT EVENTS: This AM, chart note reviewed about patient feeling her heart racing and EKG was NSR. SBP in AM was 80s and received 250cc bolus and started on IVF. Patient denies any chest pain, SOB, states back pain is improving, no burning with urination.    MEDICATIONS  (STANDING):  ertapenem  IVPB 1000 milliGRAM(s) IV Intermittent every 24 hours  ertapenem  IVPB      lactobacillus acidophilus 1 Tablet(s) Oral two times a day  sodium chloride 0.9% lock flush 3 milliLiter(s) IV Push every 8 hours  sodium chloride 0.9%. 1000 milliLiter(s) (100 mL/Hr) IV Continuous <Continuous>    MEDICATIONS  (PRN):      CAPILLARY BLOOD GLUCOSE        I&O's Summary    06 Mar 2020 07:01  -  07 Mar 2020 07:00  --------------------------------------------------------  IN: 1390 mL / OUT: 0 mL / NET: 1390 mL        PHYSICAL EXAM:  Vital Signs Last 24 Hrs  T(C): 36.5 (07 Mar 2020 05:03), Max: 37 (06 Mar 2020 20:50)  T(F): 97.7 (07 Mar 2020 05:03), Max: 98.6 (06 Mar 2020 20:50)  HR: 73 (07 Mar 2020 05:03) (73 - 88)  BP: 88/58 (07 Mar 2020 05:15) (88/49 - 115/72)  BP(mean): --  RR: 17 (07 Mar 2020 05:03) (17 - 19)  SpO2: 100% (07 Mar 2020 05:03) (100% - 100%)    GENERAL: NAD, well-developed, found resting comfortably  EYES: sclera clear  CHEST/LUNG: Clear to auscultation bilaterally; No wheezing  HEART: s1,s2, Regular rate and rhythm; No murmurs, rubs, or gallops  ABDOMEN: Soft, Nontender, Nondistended; Bowel sounds present  BACK: no significant CVA tenderness  EXTREMITIES:  WWP, no edema  NEUROLOGY: non-focal  SKIN: No rashes or lesions  LABS:                        11.1   9.97  )-----------( 255      ( 07 Mar 2020 06:00 )             35.3     03-07    135  |  103  |  12  ----------------------------<  93  3.8   |  20<L>  |  0.69    Ca    8.7      07 Mar 2020 06:00  Phos  3.7     03-07  Mg     2.0     03-07    TPro  7.8  /  Alb  4.5  /  TBili  0.2  /  DBili  x   /  AST  19  /  ALT  14  /  AlkPhos  77  03-05          Urinalysis Basic - ( 05 Mar 2020 21:49 )    Color: DARK YELLOW / Appearance: Lt TURBID / S.011 / pH: 7.0  Gluc: NEGATIVE / Ketone: NEGATIVE  / Bili: TRACE / Urobili: TRACE   Blood: NEGATIVE / Protein: NEGATIVE / Nitrite: POSITIVE   Leuk Esterase: SMALL / RBC: 0-2 / WBC 11-25   Sq Epi: MODERATE / Non Sq Epi: x / Bacteria: FEW        Culture - Blood (collected 06 Mar 2020 01:56)  Source: .Blood Blood-Venous  Preliminary Report (07 Mar 2020 02:02):    No growth to date.    Culture - Blood (collected 06 Mar 2020 01:56)  Source: .Blood Blood-Peripheral  Preliminary Report (07 Mar 2020 02:02):    No growth to date.    Culture - Urine (collected 05 Mar 2020 21:02)  Source: .Urine Clean Catch (Midstream)  Final Report (07 Mar 2020 02:31):    <10,000 CFU/mL Normal Urogenital Martha        RADIOLOGY & ADDITIONAL TESTS:  Results Reviewed:   Imaging Personally Reviewed:  Electrocardiogram Personally Reviewed:    COORDINATION OF CARE:  Care Discussed with Consultants/Other Providers [Y/N]:  Prior or Outpatient Records Reviewed [Y/N]:

## 2020-03-08 ENCOUNTER — TRANSCRIPTION ENCOUNTER (OUTPATIENT)
Age: 30
End: 2020-03-08

## 2020-03-08 VITALS
HEART RATE: 74 BPM | OXYGEN SATURATION: 98 % | DIASTOLIC BLOOD PRESSURE: 53 MMHG | RESPIRATION RATE: 16 BRPM | SYSTOLIC BLOOD PRESSURE: 99 MMHG | TEMPERATURE: 98 F

## 2020-03-08 LAB
ANION GAP SERPL CALC-SCNC: 11 MMO/L — SIGNIFICANT CHANGE UP (ref 7–14)
BUN SERPL-MCNC: 10 MG/DL — SIGNIFICANT CHANGE UP (ref 7–23)
CALCIUM SERPL-MCNC: 9.4 MG/DL — SIGNIFICANT CHANGE UP (ref 8.4–10.5)
CHLORIDE SERPL-SCNC: 101 MMOL/L — SIGNIFICANT CHANGE UP (ref 98–107)
CO2 SERPL-SCNC: 26 MMOL/L — SIGNIFICANT CHANGE UP (ref 22–31)
CREAT SERPL-MCNC: 0.84 MG/DL — SIGNIFICANT CHANGE UP (ref 0.5–1.3)
GLUCOSE SERPL-MCNC: 114 MG/DL — HIGH (ref 70–99)
HCT VFR BLD CALC: 38.1 % — SIGNIFICANT CHANGE UP (ref 34.5–45)
HGB BLD-MCNC: 12.3 G/DL — SIGNIFICANT CHANGE UP (ref 11.5–15.5)
MAGNESIUM SERPL-MCNC: 1.8 MG/DL — SIGNIFICANT CHANGE UP (ref 1.6–2.6)
MCHC RBC-ENTMCNC: 28 PG — SIGNIFICANT CHANGE UP (ref 27–34)
MCHC RBC-ENTMCNC: 32.3 % — SIGNIFICANT CHANGE UP (ref 32–36)
MCV RBC AUTO: 86.8 FL — SIGNIFICANT CHANGE UP (ref 80–100)
NRBC # FLD: 0 K/UL — SIGNIFICANT CHANGE UP (ref 0–0)
PHOSPHATE SERPL-MCNC: 2.6 MG/DL — SIGNIFICANT CHANGE UP (ref 2.5–4.5)
PLATELET # BLD AUTO: 279 K/UL — SIGNIFICANT CHANGE UP (ref 150–400)
PMV BLD: 10.8 FL — SIGNIFICANT CHANGE UP (ref 7–13)
POTASSIUM SERPL-MCNC: 3.8 MMOL/L — SIGNIFICANT CHANGE UP (ref 3.5–5.3)
POTASSIUM SERPL-SCNC: 3.8 MMOL/L — SIGNIFICANT CHANGE UP (ref 3.5–5.3)
RBC # BLD: 4.39 M/UL — SIGNIFICANT CHANGE UP (ref 3.8–5.2)
RBC # FLD: 13.9 % — SIGNIFICANT CHANGE UP (ref 10.3–14.5)
SODIUM SERPL-SCNC: 138 MMOL/L — SIGNIFICANT CHANGE UP (ref 135–145)
WBC # BLD: 11.32 K/UL — HIGH (ref 3.8–10.5)
WBC # FLD AUTO: 11.32 K/UL — HIGH (ref 3.8–10.5)

## 2020-03-08 PROCEDURE — 99239 HOSP IP/OBS DSCHRG MGMT >30: CPT

## 2020-03-08 RX ORDER — ACETAMINOPHEN 500 MG
650 TABLET ORAL ONCE
Refills: 0 | Status: COMPLETED | OUTPATIENT
Start: 2020-03-08 | End: 2020-03-08

## 2020-03-08 RX ORDER — AZTREONAM 2 G
1 VIAL (EA) INJECTION
Qty: 4 | Refills: 0
Start: 2020-03-08 | End: 2020-03-09

## 2020-03-08 RX ADMIN — Medication 650 MILLIGRAM(S): at 01:59

## 2020-03-08 RX ADMIN — SODIUM CHLORIDE 3 MILLILITER(S): 9 INJECTION INTRAMUSCULAR; INTRAVENOUS; SUBCUTANEOUS at 05:37

## 2020-03-08 RX ADMIN — Medication 650 MILLIGRAM(S): at 02:45

## 2020-03-08 RX ADMIN — ERTAPENEM SODIUM 120 MILLIGRAM(S): 1 INJECTION, POWDER, LYOPHILIZED, FOR SOLUTION INTRAMUSCULAR; INTRAVENOUS at 01:59

## 2020-03-08 RX ADMIN — Medication 1 TABLET(S): at 05:38

## 2020-03-08 NOTE — DISCHARGE NOTE PROVIDER - NSDCFUSCHEDAPPT_GEN_ALL_CORE_FT
JEANINE TERAN ; 03/16/2020 ; [facility] [address] [place] [address] JEANINE TERAN ; 03/16/2020 ; [facility] [address] [place] [address] JEANINE TERAN ; 03/16/2020 ; [facility] [address] [place] [address] JEANINE TERAN ; 04/21/2020 ; [facility]

## 2020-03-08 NOTE — DISCHARGE NOTE PROVIDER - CARE PROVIDER_API CALL
Celso Mitchell)  Cardiovascular Disease  [address] [place] [address]  Phone: [phone]  Fax: [phone]  Follow Up Time:

## 2020-03-08 NOTE — DISCHARGE NOTE NURSING/CASE MANAGEMENT/SOCIAL WORK - PATIENT PORTAL LINK FT
You can access the FollowMyHealth Patient Portal offered by [facility] by registering at the following website: [email or web address]. By joining [employer]’s FollowMyHealth portal, you will also be able to view your health information using other applications (apps) compatible with our system.

## 2020-03-08 NOTE — DISCHARGE NOTE NURSING/CASE MANAGEMENT/SOCIAL WORK - NSDCPECAREGIVERED_GEN_ALL_CORE
No/call md for sign of infection (temp greater than 100.4f, pain not relieved by over the counter meds), drink 9-13 eight oz. glasses of fluid daily.

## 2020-03-08 NOTE — PROGRESS NOTE ADULT - PROBLEM SELECTOR PLAN 1
Urine culture from 3/2 growing ESBL on Ertapenem, 3/6 Bcx NGTD  3/5 Ucx: nml  vishal  Renal US negative  Bcx remain negative. Will DC home with Bactrim to complete 5 day course per ID recs

## 2020-03-08 NOTE — DISCHARGE NOTE PROVIDER - NSDCCPCAREPLAN_GEN_ALL_CORE_FT
PRINCIPAL DISCHARGE DIAGNOSIS  Diagnosis: UTI (urinary tract infection)  Assessment and Plan of Treatment: You were admitted to the hosptial for Urinary tract infection. Your urine culture grew Extended Spectrum Beta-Lactamase (ESBL) E. Coli. You were treated with IV antibiotics in the hospital. You will contine to be on antibiotics for a total of 7 days. Please continue to take Bactrim DS as prescribed. If you are having symptoms of buring on urination, suprapubic pain, flank pain, fever and/or chills please return to the Emergency Room for further evaluation. Please follow up with your primary care doctor 1-2 weeks for a post hospitalization discharge visit.

## 2020-03-08 NOTE — DISCHARGE NOTE PROVIDER - HOSPITAL COURSE
30 y.o. female with history of multiple UTIs in the past admitted for ESBL UTI. Patient had dysuria for 3-4 days and went to the ED for evaluation. Urine culture collected at that time showed the patient had ESBL UTI, and was told to come back to the hospital to be admitted for IV abx. The patient symptoms improved over time. Blood cultures collected on 3/6 had no growth for 48 hours.         The patient was deemed stable for discharge by ______ on 3/8/2020. 30 y.o. female with history of multiple UTIs in the past admitted for ESBL UTI. Patient had dysuria for 3-4 days and went to the ED for evaluation. Urine culture collected at that time showed the patient had ESBL UTI, and was told to come back to the hospital to be admitted for IV abx. The patient symptoms improved over time. Blood cultures collected on 3/6 had no growth for 48 hours.         The patient was deemed stable for discharge by Dr. Castro on 3/8/2020.

## 2020-03-08 NOTE — PROGRESS NOTE ADULT - SUBJECTIVE AND OBJECTIVE BOX
[facility] Division of Hospital Medicine  Blanca Castro DO  Pager (JASPREET, 8A-5P): [phone]      Patient is a 30y old  Female who presents with a chief complaint of UTI, ESBL , (08 Mar 2020 08:43)      SUBJECTIVE / OVERNIGHT EVENTS: No acute events overnight. Denies chest pain, SOB, N/V/D. Pain is mild in suprapubic area but is improved. No burning with urination.    MEDICATIONS  (STANDING):  ertapenem  IVPB 1000 milliGRAM(s) IV Intermittent every 24 hours  ertapenem  IVPB      lactobacillus acidophilus 1 Tablet(s) Oral two times a day  sodium chloride 0.9% lock flush 3 milliLiter(s) IV Push every 8 hours  sodium chloride 0.9%. 1000 milliLiter(s) (100 mL/Hr) IV Continuous <Continuous>    MEDICATIONS  (PRN):      CAPILLARY BLOOD GLUCOSE        I&O's Summary    07 Mar 2020 06:01  -  08 Mar 2020 07:00  --------------------------------------------------------  IN: 1440 mL / OUT: 0 mL / NET: 1440 mL        PHYSICAL EXAM:  Vital Signs Last 24 Hrs  T(C): 36.8 (08 Mar 2020 11:05), Max: 36.9 (07 Mar 2020 18:11)  T(F): 98.3 (08 Mar 2020 11:05), Max: 98.5 (08 Mar 2020 01:46)  HR: 74 (08 Mar 2020 11:05) (68 - 93)  BP: 99/53 (08 Mar 2020 11:05) (92/61 - 113/70)  BP(mean): --  RR: 16 (08 Mar 2020 11:05) (15 - 18)  SpO2: 98% (08 Mar 2020 11:05) (98% - 100%)    GENERAL: NAD, well-developed, found resting comfortably  EYES: sclera clear  CHEST/LUNG: Clear to auscultation bilaterally; No wheezing  HEART: s1,s2, Regular rate and rhythm; No murmurs, rubs, or gallops  ABDOMEN: Soft, Nontender, Nondistended; Bowel sounds present  BACK: no significant CVA tenderness  EXTREMITIES:  WWP, no edema  NEUROLOGY: non-focal  SKIN: No rashes or lesions    LABS:                        12.3   11.32 )-----------( 279      ( 08 Mar 2020 06:05 )             38.1     03-08    138  |  101  |  10  ----------------------------<  114<H>  3.8   |  26  |  0.84    Ca    9.4      08 Mar 2020 06:05  Phos  2.6     03-08  Mg     1.8     03-08                Culture - Blood (collected 06 Mar 2020 01:56)  Source: .Blood Blood-Venous  Preliminary Report (07 Mar 2020 02:02):    No growth to date.    Culture - Blood (collected 06 Mar 2020 01:56)  Source: .Blood Blood-Peripheral  Preliminary Report (07 Mar 2020 02:02):    No growth to date.    Culture - Urine (collected 05 Mar 2020 21:02)  Source: .Urine Clean Catch (Midstream)  Final Report (07 Mar 2020 02:31):    <10,000 CFU/mL Normal Urogenital Martha        RADIOLOGY & ADDITIONAL TESTS:  Results Reviewed:   Imaging Personally Reviewed:  Electrocardiogram Personally Reviewed:    COORDINATION OF CARE:  Care Discussed with Consultants/Other Providers [Y/N]:  Prior or Outpatient Records Reviewed [Y/N]:

## 2020-03-08 NOTE — DISCHARGE NOTE PROVIDER - NSDCMRMEDTOKEN_GEN_ALL_CORE_FT
cephalexin 500 mg oral capsule: 1 cap(s) orally 3 times a day MDD:3 tabs  Flagyl 500 mg oral tablet: 1 tab(s) orally 3 times a day MDD:3 tabs  nitrofurantoin macrocrystals 100 mg oral capsule: 1 cap(s) orally 2 times a day   Pyridium 100 mg oral tablet: 1 tab(s) orally 3 times a day (after meals), As Needed for urinary symptoms. Pyridium 100 mg oral tablet: 1 tab(s) orally 3 times a day (after meals), As Needed for urinary symptoms. Bactrim  mg-160 mg oral tablet: 1 tab(s) orally 2 times a day. Take first dose on 3/9 to complete 5 day course.   Pyridium 100 mg oral tablet: 1 tab(s) orally 3 times a day (after meals), As Needed for urinary symptoms.

## 2020-03-08 NOTE — DISCHARGE NOTE NURSING/CASE MANAGEMENT/SOCIAL WORK - NSDCPNDISPN_GEN_ALL_CORE
Opioids not applicable/not prescribed/Activities of daily living, including home environment that might     exacerbate pain or reduce effectiveness of the pain management plan of care as well as strategies to address these issues/Side effects of pain management treatment/Education provided on the pain management plan of care

## 2020-03-08 NOTE — PROVIDER CONTACT NOTE (OTHER) - ASSESSMENT
pt. in no  distress, awake, alert and oriented x4, denies chest pain and SOB, denies difficulty breathing.

## 2020-03-11 LAB
CULTURE RESULTS: SIGNIFICANT CHANGE UP
CULTURE RESULTS: SIGNIFICANT CHANGE UP
SPECIMEN SOURCE: SIGNIFICANT CHANGE UP
SPECIMEN SOURCE: SIGNIFICANT CHANGE UP

## 2020-03-16 ENCOUNTER — OUTPATIENT (OUTPATIENT)
Dept: OUTPATIENT SERVICES | Facility: HOSPITAL | Age: 30
LOS: 1 days | End: 2020-03-16

## 2020-03-16 ENCOUNTER — APPOINTMENT (OUTPATIENT)
Dept: OBGYN | Facility: HOSPITAL | Age: 30
End: 2020-03-16

## 2020-03-16 ENCOUNTER — APPOINTMENT (OUTPATIENT)
Dept: UROLOGY | Facility: CLINIC | Age: 30
End: 2020-03-16
Payer: COMMERCIAL

## 2020-03-16 ENCOUNTER — RESULT CHARGE (OUTPATIENT)
Age: 30
End: 2020-03-16

## 2020-03-16 VITALS
WEIGHT: 156 LBS | HEART RATE: 91 BPM | RESPIRATION RATE: 18 BRPM | TEMPERATURE: 98.8 F | HEIGHT: 63 IN | SYSTOLIC BLOOD PRESSURE: 112 MMHG | BODY MASS INDEX: 27.64 KG/M2 | DIASTOLIC BLOOD PRESSURE: 77 MMHG

## 2020-03-16 LAB
HCG UR QL: POSITIVE
QUALITY CONTROL: YES

## 2020-03-16 PROCEDURE — 99204 OFFICE O/P NEW MOD 45 MIN: CPT

## 2020-03-16 NOTE — HISTORY OF PRESENT ILLNESS
[FreeTextEntry1] : 30-year-old woman who presents today with concern for urinary tract infection.  She was seen in the emergency room last week.  Her urine culture showed normal urogenital growth.  They had also done blood cultures for her which are negative.  Her main complaint is vaginal burning which is associated with as well as without void.  She denies fever chills nausea vomiting.  She denies any other irritative or obstructive voiding symptoms.  She does state that she is recently had a positive pregnancy test at home in the last couple of days.  She has not called or seen her GYN for these vaginal issues. Of note she was seen back in January and had a full battery of urine test which were negative. She also complains of some occasional vaginal discharge.

## 2020-03-16 NOTE — PHYSICAL EXAM
[General Appearance - Well Developed] : well developed [General Appearance - Well Nourished] : well nourished [Normal Appearance] : normal appearance [Well Groomed] : well groomed [General Appearance - In No Acute Distress] : no acute distress [Edema] : no peripheral edema [Respiration, Rhythm And Depth] : normal respiratory rhythm and effort [Exaggerated Use Of Accessory Muscles For Inspiration] : no accessory muscle use [Abdomen Soft] : soft [Abdomen Tenderness] : non-tender [Costovertebral Angle Tenderness] : no ~M costovertebral angle tenderness [FreeTextEntry1] : Deferred to Gyn [Normal Station and Gait] : the gait and station were normal for the patient's age [] : no rash [No Focal Deficits] : no focal deficits [Oriented To Time, Place, And Person] : oriented to person, place, and time

## 2020-03-16 NOTE — ASSESSMENT
[FreeTextEntry1] : \par \par Impression/plan: 30-year-old woman with concern for urinary tract infection.  Recent culture is negative.  Main symptoms seem to be vaginal in nature. She recently had a positive at home pregnancy test.  I have recommended for her to see her gynecologist this week for a formal pelvic exam as well as a formal assessment for pregnancy.  I will send her urine off for a repeat urine culture as well as GC chlamydia.

## 2020-03-16 NOTE — REVIEW OF SYSTEMS
[Fever] : fever [Abdominal Pain] : abdominal pain [Genital yeast infection] : genital yeast infection [Urine Infection (bladder/kidney)] : bladder/kidney infection [Negative] : Heme/Lymph

## 2020-03-17 DIAGNOSIS — Z32.00 ENCOUNTER FOR PREGNANCY TEST, RESULT UNKNOWN: ICD-10-CM

## 2020-03-24 LAB
C TRACH RRNA SPEC QL NAA+PROBE: NOT DETECTED
N GONORRHOEA RRNA SPEC QL NAA+PROBE: NOT DETECTED
SOURCE AMPLIFICATION: NORMAL

## 2020-04-21 ENCOUNTER — LABORATORY RESULT (OUTPATIENT)
Age: 30
End: 2020-04-21

## 2020-04-21 ENCOUNTER — OUTPATIENT (OUTPATIENT)
Dept: OUTPATIENT SERVICES | Facility: HOSPITAL | Age: 30
LOS: 1 days | End: 2020-04-21
Payer: MEDICAID

## 2020-04-21 ENCOUNTER — NON-APPOINTMENT (OUTPATIENT)
Age: 30
End: 2020-04-21

## 2020-04-21 ENCOUNTER — MED ADMIN CHARGE (OUTPATIENT)
Age: 30
End: 2020-04-21

## 2020-04-21 ENCOUNTER — RESULT REVIEW (OUTPATIENT)
Age: 30
End: 2020-04-21

## 2020-04-21 ENCOUNTER — APPOINTMENT (OUTPATIENT)
Dept: OBGYN | Facility: HOSPITAL | Age: 30
End: 2020-04-21

## 2020-04-21 DIAGNOSIS — Z87.898 PERSONAL HISTORY OF OTHER SPECIFIED CONDITIONS: ICD-10-CM

## 2020-04-21 DIAGNOSIS — Z83.3 FAMILY HISTORY OF DIABETES MELLITUS: ICD-10-CM

## 2020-04-21 DIAGNOSIS — N94.9 UNSPECIFIED CONDITION ASSOCIATED WITH FEMALE GENITAL ORGANS AND MENSTRUAL CYCLE: ICD-10-CM

## 2020-04-21 DIAGNOSIS — Z34.90 ENCOUNTER FOR SUPERVISION OF NORMAL PREGNANCY, UNSPECIFIED, UNSPECIFIED TRIMESTER: ICD-10-CM

## 2020-04-21 DIAGNOSIS — Z87.440 PERSONAL HISTORY OF URINARY (TRACT) INFECTIONS: ICD-10-CM

## 2020-04-21 DIAGNOSIS — Z23 ENCOUNTER FOR IMMUNIZATION: ICD-10-CM

## 2020-04-21 DIAGNOSIS — B37.3 CANDIDIASIS OF VULVA AND VAGINA: ICD-10-CM

## 2020-04-21 LAB
APPEARANCE UR: SIGNIFICANT CHANGE UP
BACTERIA # UR AUTO: SIGNIFICANT CHANGE UP
BILIRUB UR-MCNC: NEGATIVE — SIGNIFICANT CHANGE UP
BLOOD UR QL VISUAL: SIGNIFICANT CHANGE UP
COLOR SPEC: YELLOW — SIGNIFICANT CHANGE UP
GLUCOSE UR-MCNC: NEGATIVE — SIGNIFICANT CHANGE UP
HYALINE CASTS # UR AUTO: HIGH
KETONES UR-MCNC: NEGATIVE — SIGNIFICANT CHANGE UP
LEUKOCYTE ESTERASE UR-ACNC: SIGNIFICANT CHANGE UP
NITRITE UR-MCNC: NEGATIVE — SIGNIFICANT CHANGE UP
PH UR: 6 — SIGNIFICANT CHANGE UP (ref 5–8)
PROT UR-MCNC: 20 — SIGNIFICANT CHANGE UP
RBC CASTS # UR COMP ASSIST: SIGNIFICANT CHANGE UP (ref 0–?)
SP GR SPEC: 1.01 — SIGNIFICANT CHANGE UP (ref 1–1.04)
SQUAMOUS # UR AUTO: SIGNIFICANT CHANGE UP
T4 FREE SERPL-MCNC: 1.14 NG/DL — SIGNIFICANT CHANGE UP (ref 0.9–1.8)
TSH SERPL-MCNC: 2.36 UIU/ML — SIGNIFICANT CHANGE UP (ref 0.27–4.2)
URATE SERPL-MCNC: 2.3 MG/DL — LOW (ref 2.5–7)
UROBILINOGEN FLD QL: NORMAL — SIGNIFICANT CHANGE UP
WBC UR QL: HIGH (ref 0–?)

## 2020-04-21 PROCEDURE — G0452: CPT

## 2020-04-21 RX ORDER — PYRIDOXINE HCL (VITAMIN B6) 25 MG
25 TABLET ORAL DAILY
Qty: 30 | Refills: 2 | Status: ACTIVE | COMMUNITY
Start: 2020-04-21 | End: 1900-01-01

## 2020-04-22 DIAGNOSIS — Z87.440 PERSONAL HISTORY OF URINARY (TRACT) INFECTIONS: ICD-10-CM

## 2020-04-22 DIAGNOSIS — Z34.91 ENCOUNTER FOR SUPERVISION OF NORMAL PREGNANCY, UNSPECIFIED, FIRST TRIMESTER: ICD-10-CM

## 2020-04-22 LAB
24R-OH-CALCIDIOL SERPL-MCNC: 54.4 NG/ML — SIGNIFICANT CHANGE UP (ref 30–80)
CULTURE RESULTS: SIGNIFICANT CHANGE UP
HGB A MFR BLD: 96.3 % — SIGNIFICANT CHANGE UP
HGB A2 MFR BLD: 3.2 % — SIGNIFICANT CHANGE UP (ref 2.4–3.5)
HGB ELECT COMMENT: SIGNIFICANT CHANGE UP
HGB F MFR BLD: < 1 % — SIGNIFICANT CHANGE UP (ref 0–1.5)
MEV IGG SER-ACNC: >300 AU/ML — SIGNIFICANT CHANGE UP
MEV IGG+IGM SER-IMP: POSITIVE — SIGNIFICANT CHANGE UP
SPECIMEN SOURCE: SIGNIFICANT CHANGE UP
T PALLIDUM AB TITR SER: NEGATIVE — SIGNIFICANT CHANGE UP

## 2020-04-23 LAB
GAMMA INTERFERON BACKGROUND BLD IA-ACNC: 0.01 IU/ML — SIGNIFICANT CHANGE UP
LEAD SERPL-MCNC: 1 UG/DL — SIGNIFICANT CHANGE UP (ref 0–4)
M TB IFN-G BLD-IMP: HIGH
M TB IFN-G CD4+ BCKGRND COR BLD-ACNC: 0 IU/ML — SIGNIFICANT CHANGE UP
M TB IFN-G CD4+CD8+ BCKGRND COR BLD-ACNC: 0 IU/ML — SIGNIFICANT CHANGE UP
QUANT TB PLUS MITOGEN MINUS NIL: 0 IU/ML — SIGNIFICANT CHANGE UP

## 2020-04-27 LAB — CFTR MUT ANL BLD/T: NEGATIVE — SIGNIFICANT CHANGE UP

## 2020-05-14 ENCOUNTER — APPOINTMENT (OUTPATIENT)
Dept: OBGYN | Facility: HOSPITAL | Age: 30
End: 2020-05-14

## 2020-05-14 ENCOUNTER — ASOB RESULT (OUTPATIENT)
Age: 30
End: 2020-05-14

## 2020-05-14 ENCOUNTER — LABORATORY RESULT (OUTPATIENT)
Age: 30
End: 2020-05-14

## 2020-05-14 ENCOUNTER — APPOINTMENT (OUTPATIENT)
Dept: ANTEPARTUM | Facility: CLINIC | Age: 30
End: 2020-05-14
Payer: MEDICAID

## 2020-05-14 ENCOUNTER — NON-APPOINTMENT (OUTPATIENT)
Age: 30
End: 2020-05-14

## 2020-05-14 ENCOUNTER — APPOINTMENT (OUTPATIENT)
Dept: ANTEPARTUM | Facility: CLINIC | Age: 30
End: 2020-05-14

## 2020-05-14 ENCOUNTER — RESULT REVIEW (OUTPATIENT)
Age: 30
End: 2020-05-14

## 2020-05-14 ENCOUNTER — OUTPATIENT (OUTPATIENT)
Dept: OUTPATIENT SERVICES | Facility: HOSPITAL | Age: 30
LOS: 1 days | End: 2020-05-14

## 2020-05-14 VITALS
BODY MASS INDEX: 26.04 KG/M2 | HEART RATE: 88 BPM | SYSTOLIC BLOOD PRESSURE: 102 MMHG | DIASTOLIC BLOOD PRESSURE: 67 MMHG | WEIGHT: 147 LBS | TEMPERATURE: 98 F

## 2020-05-14 LAB
BASOPHILS # BLD AUTO: 0.03 K/UL — SIGNIFICANT CHANGE UP (ref 0–0.2)
BASOPHILS NFR BLD AUTO: 0.2 % — SIGNIFICANT CHANGE UP (ref 0–2)
EOSINOPHIL # BLD AUTO: 0.08 K/UL — SIGNIFICANT CHANGE UP (ref 0–0.5)
EOSINOPHIL NFR BLD AUTO: 0.6 % — SIGNIFICANT CHANGE UP (ref 0–6)
HCT VFR BLD CALC: 35.6 % — SIGNIFICANT CHANGE UP (ref 34.5–45)
HGB BLD-MCNC: 11.9 G/DL — SIGNIFICANT CHANGE UP (ref 11.5–15.5)
IMM GRANULOCYTES NFR BLD AUTO: 0.7 % — SIGNIFICANT CHANGE UP (ref 0–1.5)
LYMPHOCYTES # BLD AUTO: 19.1 % — SIGNIFICANT CHANGE UP (ref 13–44)
LYMPHOCYTES # BLD AUTO: 2.64 K/UL — SIGNIFICANT CHANGE UP (ref 1–3.3)
MCHC RBC-ENTMCNC: 28.3 PG — SIGNIFICANT CHANGE UP (ref 27–34)
MCHC RBC-ENTMCNC: 33.4 % — SIGNIFICANT CHANGE UP (ref 32–36)
MCV RBC AUTO: 84.6 FL — SIGNIFICANT CHANGE UP (ref 80–100)
MONOCYTES # BLD AUTO: 0.74 K/UL — SIGNIFICANT CHANGE UP (ref 0–0.9)
MONOCYTES NFR BLD AUTO: 5.3 % — SIGNIFICANT CHANGE UP (ref 2–14)
NEUTROPHILS # BLD AUTO: 10.26 K/UL — HIGH (ref 1.8–7.4)
NEUTROPHILS NFR BLD AUTO: 74.1 % — SIGNIFICANT CHANGE UP (ref 43–77)
NRBC # FLD: 0 K/UL — SIGNIFICANT CHANGE UP (ref 0–0)
PLATELET # BLD AUTO: 210 K/UL — SIGNIFICANT CHANGE UP (ref 150–400)
PMV BLD: 10.2 FL — SIGNIFICANT CHANGE UP (ref 7–13)
RBC # BLD: 4.21 M/UL — SIGNIFICANT CHANGE UP (ref 3.8–5.2)
RBC # FLD: 14.1 % — SIGNIFICANT CHANGE UP (ref 10.3–14.5)
WBC # BLD: 13.84 K/UL — HIGH (ref 3.8–10.5)
WBC # FLD AUTO: 13.84 K/UL — HIGH (ref 3.8–10.5)

## 2020-05-14 PROCEDURE — 36416 COLLJ CAPILLARY BLOOD SPEC: CPT

## 2020-05-14 PROCEDURE — 76813 OB US NUCHAL MEAS 1 GEST: CPT | Mod: 26

## 2020-05-14 PROCEDURE — 76801 OB US < 14 WKS SINGLE FETUS: CPT | Mod: 26

## 2020-05-15 DIAGNOSIS — Z34.80 ENCOUNTER FOR SUPERVISION OF OTHER NORMAL PREGNANCY, UNSPECIFIED TRIMESTER: ICD-10-CM

## 2020-05-15 LAB
CULTURE RESULTS: SIGNIFICANT CHANGE UP
METHOD TYPE: SIGNIFICANT CHANGE UP
ORGANISM # SPEC MICROSCOPIC CNT: SIGNIFICANT CHANGE UP
ORGANISM # SPEC MICROSCOPIC CNT: SIGNIFICANT CHANGE UP
SPECIMEN SOURCE: SIGNIFICANT CHANGE UP

## 2020-05-16 ENCOUNTER — NON-APPOINTMENT (OUTPATIENT)
Age: 30
End: 2020-05-16

## 2020-05-16 ENCOUNTER — EMERGENCY (EMERGENCY)
Facility: HOSPITAL | Age: 30
LOS: 1 days | Discharge: HOME | End: 2020-05-16
Attending: EMERGENCY MEDICINE | Admitting: EMERGENCY MEDICINE
Payer: MEDICAID

## 2020-05-16 VITALS
TEMPERATURE: 98 F | DIASTOLIC BLOOD PRESSURE: 58 MMHG | OXYGEN SATURATION: 100 % | SYSTOLIC BLOOD PRESSURE: 104 MMHG | RESPIRATION RATE: 18 BRPM | HEART RATE: 89 BPM

## 2020-05-16 VITALS
HEART RATE: 95 BPM | OXYGEN SATURATION: 100 % | TEMPERATURE: 98 F | RESPIRATION RATE: 16 BRPM | SYSTOLIC BLOOD PRESSURE: 104 MMHG | DIASTOLIC BLOOD PRESSURE: 62 MMHG

## 2020-05-16 LAB
ALBUMIN SERPL ELPH-MCNC: 4.1 G/DL — SIGNIFICANT CHANGE UP (ref 3.3–5)
ALP SERPL-CCNC: 61 U/L — SIGNIFICANT CHANGE UP (ref 40–120)
ALT FLD-CCNC: 9 U/L — SIGNIFICANT CHANGE UP (ref 4–33)
ANION GAP SERPL CALC-SCNC: 13 MMO/L — SIGNIFICANT CHANGE UP (ref 7–14)
APPEARANCE UR: SIGNIFICANT CHANGE UP
AST SERPL-CCNC: 14 U/L — SIGNIFICANT CHANGE UP (ref 4–32)
BACTERIA # UR AUTO: HIGH
BASOPHILS # BLD AUTO: 0.04 K/UL — SIGNIFICANT CHANGE UP (ref 0–0.2)
BASOPHILS NFR BLD AUTO: 0.2 % — SIGNIFICANT CHANGE UP (ref 0–2)
BILIRUB SERPL-MCNC: 0.3 MG/DL — SIGNIFICANT CHANGE UP (ref 0.2–1.2)
BILIRUB UR-MCNC: NEGATIVE — SIGNIFICANT CHANGE UP
BLOOD UR QL VISUAL: HIGH
BUN SERPL-MCNC: 7 MG/DL — SIGNIFICANT CHANGE UP (ref 7–23)
CALCIUM SERPL-MCNC: 9.8 MG/DL — SIGNIFICANT CHANGE UP (ref 8.4–10.5)
CHLORIDE SERPL-SCNC: 99 MMOL/L — SIGNIFICANT CHANGE UP (ref 98–107)
CO2 SERPL-SCNC: 23 MMOL/L — SIGNIFICANT CHANGE UP (ref 22–31)
COLOR SPEC: COLORLESS — SIGNIFICANT CHANGE UP
CREAT SERPL-MCNC: 0.61 MG/DL — SIGNIFICANT CHANGE UP (ref 0.5–1.3)
EOSINOPHIL # BLD AUTO: 0.01 K/UL — SIGNIFICANT CHANGE UP (ref 0–0.5)
EOSINOPHIL NFR BLD AUTO: 0.1 % — SIGNIFICANT CHANGE UP (ref 0–6)
GAMMA INTERFERON BACKGROUND BLD IA-ACNC: 0.02 IU/ML — SIGNIFICANT CHANGE UP
GLUCOSE SERPL-MCNC: 91 MG/DL — SIGNIFICANT CHANGE UP (ref 70–99)
GLUCOSE UR-MCNC: NEGATIVE — SIGNIFICANT CHANGE UP
HCG SERPL-ACNC: SIGNIFICANT CHANGE UP MIU/ML
HCT VFR BLD CALC: 35.8 % — SIGNIFICANT CHANGE UP (ref 34.5–45)
HGB BLD-MCNC: 12 G/DL — SIGNIFICANT CHANGE UP (ref 11.5–15.5)
IMM GRANULOCYTES NFR BLD AUTO: 0.9 % — SIGNIFICANT CHANGE UP (ref 0–1.5)
KETONES UR-MCNC: HIGH
LEUKOCYTE ESTERASE UR-ACNC: SIGNIFICANT CHANGE UP
LYMPHOCYTES # BLD AUTO: 12.9 % — LOW (ref 13–44)
LYMPHOCYTES # BLD AUTO: 2.42 K/UL — SIGNIFICANT CHANGE UP (ref 1–3.3)
M TB IFN-G BLD-IMP: NEGATIVE — SIGNIFICANT CHANGE UP
M TB IFN-G CD4+ BCKGRND COR BLD-ACNC: 0 IU/ML — SIGNIFICANT CHANGE UP
M TB IFN-G CD4+CD8+ BCKGRND COR BLD-ACNC: 0 IU/ML — SIGNIFICANT CHANGE UP
MCHC RBC-ENTMCNC: 28.7 PG — SIGNIFICANT CHANGE UP (ref 27–34)
MCHC RBC-ENTMCNC: 33.5 % — SIGNIFICANT CHANGE UP (ref 32–36)
MCV RBC AUTO: 85.6 FL — SIGNIFICANT CHANGE UP (ref 80–100)
MONOCYTES # BLD AUTO: 0.82 K/UL — SIGNIFICANT CHANGE UP (ref 0–0.9)
MONOCYTES NFR BLD AUTO: 4.4 % — SIGNIFICANT CHANGE UP (ref 2–14)
NEUTROPHILS # BLD AUTO: 15.28 K/UL — HIGH (ref 1.8–7.4)
NEUTROPHILS NFR BLD AUTO: 81.5 % — HIGH (ref 43–77)
NITRITE UR-MCNC: NEGATIVE — SIGNIFICANT CHANGE UP
NRBC # FLD: 0 K/UL — SIGNIFICANT CHANGE UP (ref 0–0)
PH UR: 7 — SIGNIFICANT CHANGE UP (ref 5–8)
PLATELET # BLD AUTO: 224 K/UL — SIGNIFICANT CHANGE UP (ref 150–400)
PMV BLD: 10.3 FL — SIGNIFICANT CHANGE UP (ref 7–13)
POTASSIUM SERPL-MCNC: 3.7 MMOL/L — SIGNIFICANT CHANGE UP (ref 3.5–5.3)
POTASSIUM SERPL-SCNC: 3.7 MMOL/L — SIGNIFICANT CHANGE UP (ref 3.5–5.3)
PROT SERPL-MCNC: 8 G/DL — SIGNIFICANT CHANGE UP (ref 6–8.3)
PROT UR-MCNC: 20 — SIGNIFICANT CHANGE UP
QUANT TB PLUS MITOGEN MINUS NIL: 6.5 IU/ML — SIGNIFICANT CHANGE UP
RBC # BLD: 4.18 M/UL — SIGNIFICANT CHANGE UP (ref 3.8–5.2)
RBC # FLD: 14 % — SIGNIFICANT CHANGE UP (ref 10.3–14.5)
RBC CASTS # UR COMP ASSIST: SIGNIFICANT CHANGE UP (ref 0–?)
SODIUM SERPL-SCNC: 135 MMOL/L — SIGNIFICANT CHANGE UP (ref 135–145)
SP GR SPEC: 1.01 — SIGNIFICANT CHANGE UP (ref 1–1.04)
SQUAMOUS # UR AUTO: SIGNIFICANT CHANGE UP
UROBILINOGEN FLD QL: NORMAL — SIGNIFICANT CHANGE UP
WBC # BLD: 18.73 K/UL — HIGH (ref 3.8–10.5)
WBC # FLD AUTO: 18.73 K/UL — HIGH (ref 3.8–10.5)
WBC UR QL: HIGH (ref 0–?)

## 2020-05-16 PROCEDURE — 99284 EMERGENCY DEPT VISIT MOD MDM: CPT

## 2020-05-16 PROCEDURE — 76801 OB US < 14 WKS SINGLE FETUS: CPT | Mod: 26

## 2020-05-16 RX ORDER — CEPHALEXIN 500 MG
1 CAPSULE ORAL
Qty: 14 | Refills: 0
Start: 2020-05-16 | End: 2020-05-22

## 2020-05-16 RX ORDER — CEPHALEXIN 500 MG
500 CAPSULE ORAL ONCE
Refills: 0 | Status: COMPLETED | OUTPATIENT
Start: 2020-05-16 | End: 2020-05-16

## 2020-05-16 RX ADMIN — Medication 500 MILLIGRAM(S): at 23:52

## 2020-05-16 NOTE — ED PROVIDER NOTE - CLINICAL SUMMARY MEDICAL DECISION MAKING FREE TEXT BOX
A/P 29 yo F ~13 weeks pregnant with vag brown d/c and left adnexal tenderness  -HCG, UA, TSV US, chart reviewed Rh status (A+)

## 2020-05-16 NOTE — ED PROVIDER NOTE - OBJECTIVE STATEMENT
Attending/Duyen: 31 yo F , LMP  p/w brown spotting on bathroom tissue earlier today with subsequent left adnexal pain. Denies vag bleeding, weakness, lightheadedness, fever/chills. Pt last eval by OB ~ 3 days ago including US. Pt is followed by [facility] OB clinic. Attending/Duyen: 29 yo F , LMP 2/ p/w brown spotting on bathroom tissue earlier today with subsequent left adnexal pain. Denies vag bleeding, weakness, lightheadedness, fever/chills. Pt last eval by OB ~ 3 days ago including US. Pt is followed by [facility] OB clinic.  Sydni  ID [id number]

## 2020-05-16 NOTE — ED PROVIDER NOTE - PATIENT PORTAL LINK FT
You can access the FollowMyHealth Patient Portal offered by [facility] by registering at the following website: [email or web address]. By joining [facility]’s FollowMyHealth portal, you will also be able to view your health information using other applications (apps) compatible with our system.

## 2020-05-16 NOTE — ED PROVIDER NOTE - NSFOLLOWUPINSTRUCTIONS_ED_ALL_ED_FT
Follow up with your PMD and/ or OBGYN within 48-72 hours.    Take Keflex 500 mg 1 tab 2x/day for 7 days.   Increase fluids.   Take Tylenol 650mg 1 tab every 4-6 hours as needed for any pain.   Worsening pain, vaginal bleeding, lightheadedness or new fever return to the ED

## 2020-05-16 NOTE — ED PROVIDER NOTE - PROGRESS NOTE DETAILS
NEREIDA Ontiveros: patient signed out to me to follow up labs, us res. labs sign for WBC 18.73 other labs grossly wnl.   US showing SLIP 13 weeks 4 days with . all results given and discussed with patient via consent and translation provided by .   Will treat urine as source of infection as pt described some dysuria. keflex rx bid for 7 days.   All in agreement and understanding of plan.

## 2020-05-16 NOTE — ED ADULT NURSE NOTE - OBJECTIVE STATEMENT
pt is alert and oriented, breathing even and unlabored, c/o LLQ abdominal pain with vomiting X1 day, pt is pregnant, states she is also having a small amount of bleeding when she wipes, denies heavy bleeding or clots. Pt denies fevers/diarrhea. 20G placed RAC, labs sent, urine sent. Pt sent to ultrasound for exam, will continue to monitor.

## 2020-05-16 NOTE — ED PROVIDER NOTE - PHYSICAL EXAMINATION
Attending/Duyen: Well-appearing, NAD; PERRL/EOMI, non-icterus, supple, no LILIANA, no JVD, RRR, CTAB; Abd-soft, +LLQ PT w/ vol gaurding, no rebound, no HSM; no LE edema, A&Ox3, nonfocal; Skin-warm/dry Attending/Duyen: Well-appearing, NAD; PERRL/EOMI, non-icterus, supple, no LILIANA, no JVD, RRR, CTAB; Abd-soft, +LLQ PT w/ vol gaurding, no rebound, no HSM; no LE edema, A&Ox3, nonfocal; Skin-warm/dry    PA Incarnato:  exam: no active vaginal bleeding, os closed, no CMT, +left sided adnexal tenderness, no adnexal masses palpated

## 2020-05-16 NOTE — ED PROVIDER NOTE - CARE PLAN
Principal Discharge DX:	Vaginal bleeding in pregnancy  Secondary Diagnosis:	UTI (urinary tract infection)

## 2020-05-17 LAB
CULTURE RESULTS: SIGNIFICANT CHANGE UP
SPECIMEN SOURCE: SIGNIFICANT CHANGE UP

## 2020-05-22 ENCOUNTER — EMERGENCY (EMERGENCY)
Facility: HOSPITAL | Age: 30
LOS: 1 days | Discharge: HOME | End: 2020-05-22
Attending: EMERGENCY MEDICINE | Admitting: EMERGENCY MEDICINE
Payer: MEDICAID

## 2020-05-22 VITALS
SYSTOLIC BLOOD PRESSURE: 103 MMHG | DIASTOLIC BLOOD PRESSURE: 63 MMHG | OXYGEN SATURATION: 100 % | RESPIRATION RATE: 16 BRPM | TEMPERATURE: 98 F | HEART RATE: 87 BPM

## 2020-05-22 LAB
ALBUMIN SERPL ELPH-MCNC: 3.7 G/DL — SIGNIFICANT CHANGE UP (ref 3.3–5)
ALP SERPL-CCNC: 60 U/L — SIGNIFICANT CHANGE UP (ref 40–120)
ALT FLD-CCNC: 10 U/L — SIGNIFICANT CHANGE UP (ref 4–33)
ANION GAP SERPL CALC-SCNC: 15 MMO/L — HIGH (ref 7–14)
AST SERPL-CCNC: 17 U/L — SIGNIFICANT CHANGE UP (ref 4–32)
BASOPHILS # BLD AUTO: 0.03 K/UL — SIGNIFICANT CHANGE UP (ref 0–0.2)
BASOPHILS NFR BLD AUTO: 0.2 % — SIGNIFICANT CHANGE UP (ref 0–2)
BILIRUB SERPL-MCNC: 0.2 MG/DL — SIGNIFICANT CHANGE UP (ref 0.2–1.2)
BLD GP AB SCN SERPL QL: NEGATIVE — SIGNIFICANT CHANGE UP
BUN SERPL-MCNC: 7 MG/DL — SIGNIFICANT CHANGE UP (ref 7–23)
CALCIUM SERPL-MCNC: 9.4 MG/DL — SIGNIFICANT CHANGE UP (ref 8.4–10.5)
CHLORIDE SERPL-SCNC: 101 MMOL/L — SIGNIFICANT CHANGE UP (ref 98–107)
CO2 SERPL-SCNC: 21 MMOL/L — LOW (ref 22–31)
CREAT SERPL-MCNC: 0.6 MG/DL — SIGNIFICANT CHANGE UP (ref 0.5–1.3)
EOSINOPHIL # BLD AUTO: 0.06 K/UL — SIGNIFICANT CHANGE UP (ref 0–0.5)
EOSINOPHIL NFR BLD AUTO: 0.4 % — SIGNIFICANT CHANGE UP (ref 0–6)
GLUCOSE SERPL-MCNC: 83 MG/DL — SIGNIFICANT CHANGE UP (ref 70–99)
HCG SERPL-ACNC: SIGNIFICANT CHANGE UP MIU/ML
HCT VFR BLD CALC: 33.7 % — LOW (ref 34.5–45)
HGB BLD-MCNC: 11.4 G/DL — LOW (ref 11.5–15.5)
IMM GRANULOCYTES NFR BLD AUTO: 0.9 % — SIGNIFICANT CHANGE UP (ref 0–1.5)
LYMPHOCYTES # BLD AUTO: 19.4 % — SIGNIFICANT CHANGE UP (ref 13–44)
LYMPHOCYTES # BLD AUTO: 2.87 K/UL — SIGNIFICANT CHANGE UP (ref 1–3.3)
MCHC RBC-ENTMCNC: 28.7 PG — SIGNIFICANT CHANGE UP (ref 27–34)
MCHC RBC-ENTMCNC: 33.8 % — SIGNIFICANT CHANGE UP (ref 32–36)
MCV RBC AUTO: 84.9 FL — SIGNIFICANT CHANGE UP (ref 80–100)
MONOCYTES # BLD AUTO: 0.89 K/UL — SIGNIFICANT CHANGE UP (ref 0–0.9)
MONOCYTES NFR BLD AUTO: 6 % — SIGNIFICANT CHANGE UP (ref 2–14)
NEUTROPHILS # BLD AUTO: 10.81 K/UL — HIGH (ref 1.8–7.4)
NEUTROPHILS NFR BLD AUTO: 73.1 % — SIGNIFICANT CHANGE UP (ref 43–77)
NRBC # FLD: 0 K/UL — SIGNIFICANT CHANGE UP (ref 0–0)
PLATELET # BLD AUTO: 217 K/UL — SIGNIFICANT CHANGE UP (ref 150–400)
PMV BLD: 10.5 FL — SIGNIFICANT CHANGE UP (ref 7–13)
POTASSIUM SERPL-MCNC: 3.8 MMOL/L — SIGNIFICANT CHANGE UP (ref 3.5–5.3)
POTASSIUM SERPL-SCNC: 3.8 MMOL/L — SIGNIFICANT CHANGE UP (ref 3.5–5.3)
PROT SERPL-MCNC: 6.8 G/DL — SIGNIFICANT CHANGE UP (ref 6–8.3)
RBC # BLD: 3.97 M/UL — SIGNIFICANT CHANGE UP (ref 3.8–5.2)
RBC # FLD: 13.8 % — SIGNIFICANT CHANGE UP (ref 10.3–14.5)
RH IG SCN BLD-IMP: POSITIVE — SIGNIFICANT CHANGE UP
SODIUM SERPL-SCNC: 137 MMOL/L — SIGNIFICANT CHANGE UP (ref 135–145)
WBC # BLD: 14.79 K/UL — HIGH (ref 3.8–10.5)
WBC # FLD AUTO: 14.79 K/UL — HIGH (ref 3.8–10.5)

## 2020-05-22 PROCEDURE — 99284 EMERGENCY DEPT VISIT MOD MDM: CPT

## 2020-05-22 PROCEDURE — 76805 OB US >/= 14 WKS SNGL FETUS: CPT | Mod: 26

## 2020-05-22 NOTE — ED PROVIDER NOTE - CLINICAL SUMMARY MEDICAL DECISION MAKING FREE TEXT BOX
29 y/o female with pmhx of UTI, 13 weeks pregnant, , LMP , OBGYN at [facility], confirmed IUP, presents to ED c/o increased vaginal bleeding during pregnancy. Pt last visit on  was due to bleeding, had normal TVUS. Pt denies any pelvic pain. Pt c/o vaginal bleeding with one pad per day for past 4 days. Pt was discharged on Keflex for possible UTI, however urine culture with normal vaginal vishal. pt hemodynamically stable. plan to check labs, type and screen, hcg and TVUS.

## 2020-05-22 NOTE — ED PROVIDER NOTE - OBJECTIVE STATEMENT
29 y/o female with pmhx of UTI, 13 weeks pregnant, , LMP , OBGYN at [facility], confirmed IUP, presents to ED c/o increased vaginal bleeding during pregnancy. Pt last visit on  was due to bleeding, had normal TVUS. Pt denies any pelvic pain. Pt c/o vaginal bleeding with one pad per day for past 4 days. Pt was discharged on Keflex for possible UTI, however urine culture with normal vaginal vishal. No fever, chills, dysuria, polyuria, sob, syncope, n/v, dizziness.

## 2020-05-22 NOTE — ED ADULT NURSE NOTE - OBJECTIVE STATEMENT
Pt a&ox3, calm and cooperative. pt elected to utilize spouse on her phone during assessment. Pt c/o of vaginal bleed x4-5 days, using 1 pad daily. pt positive pregnancy, seen last week in [facility] ED for similar c/o. pt denies sob, n/v/d, headache, dizziness, urinary. respirations even/unlabored, nad noted. PA performed pelvic exam, blood clot noted. 20g iv to left ac, labs drawn and sent. will continue to monitor

## 2020-05-22 NOTE — ED PROVIDER NOTE - ATTENDING CONTRIBUTION TO CARE
DR. MALDONADO, ATTENDING MD-  I performed a face to face bedside interview with the patient regarding history of present illness, review of symptoms and past medical history. I completed an independent physical exam.  I have discussed the patient's plan of care with the PA.   Documentation as above in the note.    31 y/o female h/o uti, yeast infection, currently 13 wks pregnant here with vag spotting x2-3 days.  Threatened ab vs uti.  Obtain cbc cmp t&s tvus ua ucx reassess.

## 2020-05-22 NOTE — ED PROVIDER NOTE - NSFOLLOWUPINSTRUCTIONS_ED_ALL_ED_FT
Follow up at [facility] [phone]    Worsening, continued or new concerning symptoms return to the emergency department.

## 2020-05-22 NOTE — ED PROVIDER NOTE - PATIENT PORTAL LINK FT
You can access the FollowMyHealth Patient Portal offered by [facility] by registering at the following website: [email or web address]. By joining [facility]’s FollowMyHealth portal, you will also be able to view your health information using other applications (apps) compatible with our system.

## 2020-05-22 NOTE — ED ADULT TRIAGE NOTE - CHIEF COMPLAINT QUOTE
Pt is 13 weeks pregnant, LMP 20, MARCELLA 20. . C/o vaginal bleeding x 2-3 days. States using one pad a day. Denies any PMH

## 2020-06-08 NOTE — H&P ADULT - BACK EXAM
I have personally performed a face to face diagnostic evaluation on this patient. I have reviewed the ACP note and agree with the history, exam and plan of care, except as noted.
normal shape/ROM intact

## 2020-06-11 ENCOUNTER — OUTPATIENT (OUTPATIENT)
Dept: OUTPATIENT SERVICES | Facility: HOSPITAL | Age: 30
LOS: 1 days | End: 2020-06-11

## 2020-06-11 ENCOUNTER — NON-APPOINTMENT (OUTPATIENT)
Age: 30
End: 2020-06-11

## 2020-06-11 ENCOUNTER — LABORATORY RESULT (OUTPATIENT)
Age: 30
End: 2020-06-11

## 2020-06-11 ENCOUNTER — APPOINTMENT (OUTPATIENT)
Dept: OBGYN | Facility: HOSPITAL | Age: 30
End: 2020-06-11

## 2020-06-11 VITALS
HEART RATE: 92 BPM | DIASTOLIC BLOOD PRESSURE: 59 MMHG | BODY MASS INDEX: 26.22 KG/M2 | TEMPERATURE: 98.3 F | WEIGHT: 148 LBS | SYSTOLIC BLOOD PRESSURE: 107 MMHG

## 2020-06-12 DIAGNOSIS — Z34.80 ENCOUNTER FOR SUPERVISION OF OTHER NORMAL PREGNANCY, UNSPECIFIED TRIMESTER: ICD-10-CM

## 2020-06-16 LAB
1ST TRIMESTER DATA: SIGNIFICANT CHANGE UP
2ND TRIMESTER DATA: SIGNIFICANT CHANGE UP
AFP SERPL-ACNC: SIGNIFICANT CHANGE UP
AFP SERPL-ACNC: SIGNIFICANT CHANGE UP
B-HCG FREE SERPL-MCNC: SIGNIFICANT CHANGE UP
B-HCG FREE SERPL-MCNC: SIGNIFICANT CHANGE UP
CLINICAL BIOCHEMIST REVIEW: SIGNIFICANT CHANGE UP
DEMOGRAPHIC DATA: SIGNIFICANT CHANGE UP
INHIBIN A SERPL-MCNC: SIGNIFICANT CHANGE UP
PAPP-A SERPL-ACNC: SIGNIFICANT CHANGE UP
SCREEN-FOOTER: SIGNIFICANT CHANGE UP
U ESTRIOL SERPL-SCNC: SIGNIFICANT CHANGE UP

## 2020-07-02 ENCOUNTER — APPOINTMENT (OUTPATIENT)
Dept: OBGYN | Facility: HOSPITAL | Age: 30
End: 2020-07-02

## 2020-07-02 ENCOUNTER — OUTPATIENT (OUTPATIENT)
Dept: OUTPATIENT SERVICES | Facility: HOSPITAL | Age: 30
LOS: 1 days | End: 2020-07-02

## 2020-07-02 ENCOUNTER — NON-APPOINTMENT (OUTPATIENT)
Age: 30
End: 2020-07-02

## 2020-07-02 ENCOUNTER — INPATIENT (INPATIENT)
Facility: HOSPITAL | Age: 30
LOS: 1 days | Discharge: HOME | End: 2020-07-04
Attending: SPECIALIST | Admitting: SPECIALIST
Payer: MEDICAID

## 2020-07-02 ENCOUNTER — APPOINTMENT (OUTPATIENT)
Dept: ANTEPARTUM | Facility: CLINIC | Age: 30
End: 2020-07-02
Payer: MEDICAID

## 2020-07-02 ENCOUNTER — ASOB RESULT (OUTPATIENT)
Age: 30
End: 2020-07-02

## 2020-07-02 VITALS
WEIGHT: 151 LBS | DIASTOLIC BLOOD PRESSURE: 49 MMHG | TEMPERATURE: 97.4 F | HEIGHT: 63 IN | BODY MASS INDEX: 26.75 KG/M2 | HEART RATE: 101 BPM | SYSTOLIC BLOOD PRESSURE: 98 MMHG

## 2020-07-02 VITALS
RESPIRATION RATE: 16 BRPM | OXYGEN SATURATION: 99 % | HEART RATE: 103 BPM | DIASTOLIC BLOOD PRESSURE: 62 MMHG | SYSTOLIC BLOOD PRESSURE: 99 MMHG | TEMPERATURE: 98 F

## 2020-07-02 DIAGNOSIS — O42.912 PRETERM PREMATURE RUPTURE OF MEMBRANES, UNSPECIFIED AS TO LENGTH OF TIME BETWEEN RUPTURE AND ONSET OF LABOR, SECOND TRIMESTER: ICD-10-CM

## 2020-07-02 DIAGNOSIS — Z3A.00 WEEKS OF GESTATION OF PREGNANCY NOT SPECIFIED: ICD-10-CM

## 2020-07-02 DIAGNOSIS — O26.899 OTHER SPECIFIED PREGNANCY RELATED CONDITIONS, UNSPECIFIED TRIMESTER: ICD-10-CM

## 2020-07-02 LAB
ALBUMIN SERPL ELPH-MCNC: 3.4 G/DL — SIGNIFICANT CHANGE UP (ref 3.3–5)
ALP SERPL-CCNC: 56 U/L — SIGNIFICANT CHANGE UP (ref 40–120)
ALT FLD-CCNC: 9 U/L — SIGNIFICANT CHANGE UP (ref 4–33)
ANION GAP SERPL CALC-SCNC: 15 MMO/L — HIGH (ref 7–14)
AST SERPL-CCNC: 14 U/L — SIGNIFICANT CHANGE UP (ref 4–32)
BASOPHILS # BLD AUTO: 0.03 K/UL — SIGNIFICANT CHANGE UP (ref 0–0.2)
BASOPHILS NFR BLD AUTO: 0.2 % — SIGNIFICANT CHANGE UP (ref 0–2)
BILIRUB SERPL-MCNC: < 0.2 MG/DL — LOW (ref 0.2–1.2)
BLD GP AB SCN SERPL QL: NEGATIVE — SIGNIFICANT CHANGE UP
BUN SERPL-MCNC: 6 MG/DL — LOW (ref 7–23)
CALCIUM SERPL-MCNC: 9.2 MG/DL — SIGNIFICANT CHANGE UP (ref 8.4–10.5)
CHLORIDE SERPL-SCNC: 106 MMOL/L — SIGNIFICANT CHANGE UP (ref 98–107)
CO2 SERPL-SCNC: 20 MMOL/L — LOW (ref 22–31)
CREAT SERPL-MCNC: 0.46 MG/DL — LOW (ref 0.5–1.3)
EOSINOPHIL # BLD AUTO: 0.04 K/UL — SIGNIFICANT CHANGE UP (ref 0–0.5)
EOSINOPHIL NFR BLD AUTO: 0.2 % — SIGNIFICANT CHANGE UP (ref 0–6)
GLUCOSE SERPL-MCNC: 84 MG/DL — SIGNIFICANT CHANGE UP (ref 70–99)
HCT VFR BLD CALC: 33.1 % — LOW (ref 34.5–45)
HGB BLD-MCNC: 11 G/DL — LOW (ref 11.5–15.5)
IMM GRANULOCYTES NFR BLD AUTO: 0.7 % — SIGNIFICANT CHANGE UP (ref 0–1.5)
LYMPHOCYTES # BLD AUTO: 13.8 % — SIGNIFICANT CHANGE UP (ref 13–44)
LYMPHOCYTES # BLD AUTO: 2.46 K/UL — SIGNIFICANT CHANGE UP (ref 1–3.3)
MCHC RBC-ENTMCNC: 28.9 PG — SIGNIFICANT CHANGE UP (ref 27–34)
MCHC RBC-ENTMCNC: 33.2 % — SIGNIFICANT CHANGE UP (ref 32–36)
MCV RBC AUTO: 86.9 FL — SIGNIFICANT CHANGE UP (ref 80–100)
MONOCYTES # BLD AUTO: 0.89 K/UL — SIGNIFICANT CHANGE UP (ref 0–0.9)
MONOCYTES NFR BLD AUTO: 5 % — SIGNIFICANT CHANGE UP (ref 2–14)
NEUTROPHILS # BLD AUTO: 14.24 K/UL — HIGH (ref 1.8–7.4)
NEUTROPHILS NFR BLD AUTO: 80.1 % — HIGH (ref 43–77)
NRBC # FLD: 0 K/UL — SIGNIFICANT CHANGE UP (ref 0–0)
PLATELET # BLD AUTO: 227 K/UL — SIGNIFICANT CHANGE UP (ref 150–400)
PMV BLD: 10.4 FL — SIGNIFICANT CHANGE UP (ref 7–13)
POTASSIUM SERPL-MCNC: 3.9 MMOL/L — SIGNIFICANT CHANGE UP (ref 3.5–5.3)
POTASSIUM SERPL-SCNC: 3.9 MMOL/L — SIGNIFICANT CHANGE UP (ref 3.5–5.3)
PROT SERPL-MCNC: 6.6 G/DL — SIGNIFICANT CHANGE UP (ref 6–8.3)
RBC # BLD: 3.81 M/UL — SIGNIFICANT CHANGE UP (ref 3.8–5.2)
RBC # FLD: 14 % — SIGNIFICANT CHANGE UP (ref 10.3–14.5)
RH IG SCN BLD-IMP: POSITIVE — SIGNIFICANT CHANGE UP
SODIUM SERPL-SCNC: 141 MMOL/L — SIGNIFICANT CHANGE UP (ref 135–145)
WBC # BLD: 17.79 K/UL — HIGH (ref 3.8–10.5)
WBC # FLD AUTO: 17.79 K/UL — HIGH (ref 3.8–10.5)

## 2020-07-02 PROCEDURE — 76805 OB US >/= 14 WKS SNGL FETUS: CPT | Mod: 26

## 2020-07-02 PROCEDURE — 99232 SBSQ HOSP IP/OBS MODERATE 35: CPT

## 2020-07-02 RX ORDER — ACETAMINOPHEN 500 MG
975 TABLET ORAL ONCE
Refills: 0 | Status: COMPLETED | OUTPATIENT
Start: 2020-07-02 | End: 2020-07-02

## 2020-07-02 RX ORDER — SODIUM CHLORIDE 9 MG/ML
3 INJECTION INTRAMUSCULAR; INTRAVENOUS; SUBCUTANEOUS EVERY 8 HOURS
Refills: 0 | Status: DISCONTINUED | OUTPATIENT
Start: 2020-07-02 | End: 2020-07-04

## 2020-07-02 RX ORDER — SODIUM CHLORIDE 9 MG/ML
1000 INJECTION, SOLUTION INTRAVENOUS
Refills: 0 | Status: DISCONTINUED | OUTPATIENT
Start: 2020-07-02 | End: 2020-07-04

## 2020-07-02 RX ORDER — MORPHINE SULFATE 50 MG/1
4 CAPSULE, EXTENDED RELEASE ORAL ONCE
Refills: 0 | Status: DISCONTINUED | OUTPATIENT
Start: 2020-07-02 | End: 2020-07-02

## 2020-07-02 RX ADMIN — MORPHINE SULFATE 4 MILLIGRAM(S): 50 CAPSULE, EXTENDED RELEASE ORAL at 23:22

## 2020-07-02 RX ADMIN — Medication 975 MILLIGRAM(S): at 20:50

## 2020-07-02 RX ADMIN — SODIUM CHLORIDE 125 MILLILITER(S): 9 INJECTION, SOLUTION INTRAVENOUS at 21:50

## 2020-07-02 NOTE — CONSULT NOTE ADULT - ATTENDING COMMENTS
Patient seen and examined with resident and fellow team.  Previable PPROM confirmed on multiple exams.  My own SSE notable for large amount of fluid in the vault.  Discussed options with patient and  using Sydni .  Discussed the options of induction or conservative management with outpatient observation.  Discussed risks of infection, abruption and fetal sequelae (such as pulmonary hypoplasia if the fluid were to become oligo or anhydramnios) with conservative management.  However, since currently no signs of infection, there is an option for conservative outpatient management.  The patient and  elected to proceed with IOL.  Postpartum patient will need an MFM consult prior to next pregnancy.

## 2020-07-02 NOTE — OB PROVIDER H&P - ASSESSMENT
PCAP patient is a 31 y/o sent from clinic for rule out rupture of membrane. Patient reports being seen for anatomy visit. Patient completed transabdominal sono and turned to lateral position and reports she felt leaking of fluid at that point.     Medical and Surgical History: Denies  OBGYN History:  x 1 in 2006 in [place]    Assessment/Plan:    Vital Signs Last 24 Hrs  T(C): 36.8 (2020 16:04), Max: 36.8 (2020 16:04)  T(F): 98.24 (2020 16:04), Max: 98.24 (2020 16:04)  HR: 104 (2020 16:19) (98 - 106)  BP: 86/62 (2020 16:19) (86/62 - 99/62)  RR: 16 (2020 15:59) (16 - 16)  SpO2: 99% (2020 16:20) (99% - 100%)  TAS: breech presentation, no measurable pocket, pocket with umbilical cord  FHR visualized and heard, 161  SSE: small amount of clear fluid, Nitrazine positive, fern positive    MFM assessment:  cervical length: 2.9 cm    Evidence of rupture of membrane  - admit to labor and delivery as per MFM  - for vaginal cytotec as per MFM ( initial dose 600 mg, followed by 400 mg every 3 hours)  - patient to follow up at 6 week post partum to discuss plan of care for future pregnancies

## 2020-07-02 NOTE — OB PROVIDER H&P - NSHPPHYSICALEXAM_GEN_ALL_CORE
Vital Signs Last 24 Hrs  T(C): 36.8 (02 Jul 2020 16:04), Max: 36.8 (02 Jul 2020 16:04)  T(F): 98.24 (02 Jul 2020 16:04), Max: 98.24 (02 Jul 2020 16:04)  HR: 104 (02 Jul 2020 16:19) (98 - 106)  BP: 86/62 (02 Jul 2020 16:19) (86/62 - 99/62)  RR: 16 (02 Jul 2020 15:59) (16 - 16)  SpO2: 99% (02 Jul 2020 16:20) (99% - 100%)  TAS: breech presentation, no measurable pocket, pocket with umbilical cord  FHR visualized and heard, 161  SSE: small amount of clear fluid, Nitrazine positive, fern positive    MFM assessment:  cervical length: 2.9 cm

## 2020-07-02 NOTE — OB PROVIDER H&P - HISTORY OF PRESENT ILLNESS
PCAP patient is a 29 y/o sent from clinic for rule out rupture of membrane. Patient reports being seen for anatomy visit. Patient completed transabdominal sono and turned to lateral position and reports she felt leaking of fluid at that point.     Medical and Surgical History: Denies  OBGYN History:  x 1 in  in [place]

## 2020-07-02 NOTE — CONSULT NOTE ADULT - ASSESSMENT
Assessment / Plan:   Patient is a 30y  at 20w0d with now confirmed PPROM. Currently afebrile with no exam or lab findings concerning for infection. No other gross risk factors identified. Patient and  appropriately devastated. Counseled extensively via phone  regarting current options for conservative management vs induction termination at this time. Discussed with patient and her  that given current gestational age, fetus is currently previable and not a candidate for resuscitation. Also discussed potential risk for maternal infection should patient elect conservative management. Maternal condition currently stable.   - After extensive counseling, patient and  elect for induction termination at this time.   - Pateint to be admitted to L&D for IOL with cytotec once in room.   - Continue to monitor VS and S&S   - Patient for preconception counseling 6-8 weeks after discharge from this admission for further counseling with close cervical length monitoring and possible vaginal progesterone with future pregnancy.       SARAH Garza Fellow, G 5  Seen and discussed with Dr. Acharya

## 2020-07-02 NOTE — CHART NOTE - NSCHARTNOTEFT_GEN_A_CORE
R3 Labor Note    At bedside to discuss plan of care with patient and her . His brother was on the phone on speaker  Patient signed consents for induction, possible D&C, and blood transfusion    Vital Signs Last 24 Hrs  T(C): 36.9 (02 Jul 2020 21:26), Max: 36.9 (02 Jul 2020 18:15)  T(F): 98.42 (02 Jul 2020 21:26), Max: 98.42 (02 Jul 2020 21:26)  HR: 94 (02 Jul 2020 20:30) (90 - 139)  BP: 101/59 (02 Jul 2020 20:30) (86/62 - 104/61)  BP(mean): --  RR: 16 (02 Jul 2020 18:15) (16 - 16)  SpO2: 100% (02 Jul 2020 18:15) (75% - 100%)    0/0/-3    600mcg vaginal cytotec placed    D/w Dr. Kimani Smiley PGY-3

## 2020-07-02 NOTE — CONSULT NOTE ADULT - SUBJECTIVE AND OBJECTIVE BOX
[facility] FELLOW CONSULT NOTE:    [facility]  # [id number]    HPI:  30y  at 20w0d sent to triage reporting leakage of fluid. Patient was seen earlier today for her anatomy scan in the ultrasound unit, which was unremarkable. However, after her scan, patient reported a sudden gush of fluid. Nitrazine swab was done and found to be positive and patient thus sent to L&D for further evaluation.   Reports this to be an uncomplicated pregnancy, denies and contractions or vaginal bleeding and endorses normal fetal movement.   ROS negative for N/V, F/C, CP, SOB, urinary symptoms.     OB/GYN HISTORY:   [place]: 2  Parity: 1	  Term: 1  : 0   Living: one full term delivery with previous  approx 14 years ago     PAST MEDICAL & SURGICAL HISTORY:  Vaginal delivery: 2006  UTI (urinary tract infection): h/o  Yeast infection of the vagina: h/o  No significant past surgical history    All: NKDA   FH: bladder cancer: Mother    PE:   Vital Signs Last 24 Hrs  T(C): 36.8 (2020 16:04), Max: 36.8 (2020 16:04)  T(F): 98.24 (2020 16:04), Max: 98.24 (2020 16:04)  HR: 101 (2020 17:49) (95 - 139)  BP: 94/68 (2020 17:49) (86/62 - 99/62)  BP(mean): --  RR: 16 (2020 15:59) (16 - 16)  SpO2: 75% (2020 17:41) (75% - 100%)    PHYSICAL EXAM:  Gen: NAD, appropriately tearful   Resp: normal effort   Abd: soft, NT, gravid, no fundal tenderness appreciated     SSE: pooling of clear fluid noted, nitrazine positive     LABS:  CBC: 14 > 11.4 / 33.7 < 217    RADIOLOGY & ADDITIONAL STUDIES:  Anatomy scan (2020):  g (33%), cephalic, posterior placenta with velamentous cord insertion   TVUS (bedside, fellow, 2020): cervical length at 2.9cm with lower uterine segment stricture appreciated. [facility] FELLOW CONSULT NOTE:    [facility]  # [id number]    HPI:  30y  at 20w0d sent to triage reporting leakage of fluid. Patient was seen earlier today for her anatomy scan in the ultrasound unit, which was unremarkable. However, after her scan, patient reported a sudden gush of fluid. Nitrazine swab was done and found to be positive and patient thus sent to L&D for further evaluation.   Reports this to be an uncomplicated pregnancy, denies and contractions or vaginal bleeding and endorses normal fetal movement.   ROS negative for N/V, F/C, CP, SOB, urinary symptoms.     OB/GYN HISTORY:   [place]: 2  Parity: 1	  Term: 1  : 0   Living: one full term delivery with previous  approx 14 years ago     PAST MEDICAL & SURGICAL HISTORY:  Vaginal delivery: 2006  UTI (urinary tract infection): h/o  Yeast infection of the vagina: h/o  No significant past surgical history    All: NKDA   FH: bladder cancer: Mother    PE:   Vital Signs Last 24 Hrs  T(C): 36.8 (2020 16:04), Max: 36.8 (2020 16:04)  T(F): 98.24 (2020 16:04), Max: 98.24 (2020 16:04)  HR: 101 (2020 17:49) (95 - 139)  BP: 94/68 (2020 17:49) (86/62 - 99/62)  BP(mean): --  RR: 16 (2020 15:59) (16 - 16)  SpO2: 75% (2020 17:41) (75% - 100%)    PHYSICAL EXAM:  Gen: NAD, appropriately tearful   Resp: normal effort   Abd: soft, NT, gravid, no fundal tenderness appreciated     SSE: pooling of clear fluid noted, nitrazine positive     LABS:  CBC: 14 > 11.4 / 33.7 < 217    RADIOLOGY & ADDITIONAL STUDIES:  Anatomy scan (2020):  g (33%), cephalic, posterior placenta with velamentous cord insertion   TVUS (bedside, fellow, 2020): cervical length at 2.9 cm

## 2020-07-02 NOTE — OB PROVIDER TRIAGE NOTE - NSOBPROVIDERNOTE_OBGYN_ALL_OB_FT
Evidence of rupture of membrane  - admit to labor and delivery as per MFM  - for vaginal cytotec as per MFM ( initial dose 600 mg, followed by 400 mg every 3 hours)  - patient to follow up at 6 week post partum to discuss plan of care for future pregnancies

## 2020-07-02 NOTE — OB PROVIDER H&P - ATTENDING COMMENTS
Pt seen and counseled by MFM and has opted for IOL for Previable PPROM per cytotec protocol.        Camila Harman MD

## 2020-07-02 NOTE — OB PROVIDER TRIAGE NOTE - NSHPPHYSICALEXAM_GEN_ALL_CORE
Vital Signs Last 24 Hrs  T(C): 36.8 (02 Jul 2020 16:04), Max: 36.8 (02 Jul 2020 16:04)  T(F): 98.24 (02 Jul 2020 16:04), Max: 98.24 (02 Jul 2020 16:04)  HR: 104 (02 Jul 2020 16:19) (98 - 106)  BP: 86/62 (02 Jul 2020 16:19) (86/62 - 99/62)  RR: 16 (02 Jul 2020 15:59) (16 - 16)  SpO2: 99% (02 Jul 2020 16:20) (99% - 100%)  TAS: breech presentation, no measurable pocket, pocket with umbilical cord  FHR visualized and heard, 161  SSE: small amount of clear fluid, Nitrazine positive, fern positive Vital Signs Last 24 Hrs  T(C): 36.8 (02 Jul 2020 16:04), Max: 36.8 (02 Jul 2020 16:04)  T(F): 98.24 (02 Jul 2020 16:04), Max: 98.24 (02 Jul 2020 16:04)  HR: 104 (02 Jul 2020 16:19) (98 - 106)  BP: 86/62 (02 Jul 2020 16:19) (86/62 - 99/62)  RR: 16 (02 Jul 2020 15:59) (16 - 16)  SpO2: 99% (02 Jul 2020 16:20) (99% - 100%)  TAS: breech presentation, no measurable pocket, pocket with umbilical cord  FHR visualized and heard, 161  SSE: small amount of clear fluid, Nitrazine positive, fern positive    MFM assessment:  cervical length: 2.9 cm

## 2020-07-02 NOTE — OB PROVIDER TRIAGE NOTE - HISTORY OF PRESENT ILLNESS
PCAP patient is a 31 y/o sent from clinic for rule out rupture of membrane. Patient reports being seen for anatomy visit. Patient completed transabdominal sono and turned to lateral position and reports she felt leaking of fluid at that point.     Medical and Surgical History: Denies  OBGYN History:  x 1 in  in [place]

## 2020-07-02 NOTE — OB RN PATIENT PROFILE - NS_PRENATALCARE_OBGYN_ALL_OB
MEDICARE WELLNESS VISIT NOTE      HISTORY OF PRESENT ILLNESS:   Chente Mobley presents for his Subsequent Annual Medicare Wellness Visit.        Patient Care Team:  Robby London MD as PCP - General (Internal Medicine)        Patient Active Problem List   Diagnosis   • Chronic atrial fibrillation (CMS/HCC)   • Hyperlipidemia   • CAD (coronary artery disease)   • Essential hypertension, benign   • Long term (current) use of anticoagulants   • Lumbar spinal stenosis   • Osteoarthritis   • Gastroesophageal reflux disease without esophagitis   • Benign non-nodular prostatic hyperplasia without lower urinary tract symptoms   • Kidney stone on left side         Past Medical History:   Diagnosis Date   • Ankle fracture 01/01/1971    right   • Atrial fibrillation with RVR (CMS/HCC)    • BPH (benign prostatic hyperplasia)    • CAD (coronary artery disease)     s/p CABG in 1982   • Closed rib fracture 2004    fell off ladder; also with kidney contusion   • Concussion 1949, 1953   • Essential hypertension, benign    • GERD (gastroesophageal reflux disease)    • Kidney stone     stones   • Long term (current) use of anticoagulants    • Lumbar degenerative disc disease    • Lumbar spinal stenosis    • Moderate mitral regurgitation by prior echocardiogram 04/01/2006   • Osteoarthritis    • Other and unspecified hyperlipidemia    • Pneumonia 1936   • Pulmonary emboli (CMS/HCC) 01/01/1971    developed after ankle fx   • Sciatica    • Shoulder dislocation 1956    left shoulder         Past Surgical History:   Procedure Laterality Date   • Cardiac catherization     • Cardiac surgery     • Cardioversion  04/02/2009   • Colonoscopy w/ polypectomy     • Coronary angioplasty  10/28/2018   • Coronary artery bypass graft  07/12/1982   • Echocardiogram  08/10/2018   • Eye surgery      cataract   • Hemorrhoid surgery     • Joint replacement Right 2015    hip   • Joint replacement Left 2003    shoulder   • Knee surgery  2005    left  meniscus   • Left heart cath,percutaneous  1982    Cardiac Cath   • Open anterior shoulder reconstruction Left    • Skin cancer excision      basal cell cancer   • Tonsillectomy     • Vascular surgery           Social History     Tobacco Use   • Smoking status: Former Smoker     Last attempt to quit: 1977     Years since quittin.3   • Smokeless tobacco: Never Used   Substance Use Topics   • Alcohol use: Yes     Alcohol/week: 4.2 oz     Types: 7 Standard drinks or equivalent per week     Comment: 1/2 manhattan every night   • Drug use: No     Drug use:    Drug Use:    No              Family History - Reviewed    Current Outpatient Medications   Medication Sig Dispense Refill   • warfarin (COUMADIN) 5 MG tablet Take 5 mg by mouth as directed. Take 1 tablet (=5mg) on , Monday, Wednesday, Thursday, Friday, Saturday. Take 1/2 tablet (=2.5mg) on Tuesday.     • pantoprazole (PROTONIX) 40 MG tablet Take 1 tablet by mouth daily. 90 tablet 1   • tamsulosin (FLOMAX) 0.4 MG Cap Take 1 capsule by mouth 2 times daily. 180 capsule 4   • Ascorbic Acid (VITAMIN C WITH GALILEA HIPS) 1000 MG tablet Take 1,000 mg by mouth daily.     • amLODIPine (NORVASC) 10 MG tablet Take 1 tablet by mouth daily. 90 tablet 1   • atorvastatin (LIPITOR) 80 MG tablet Take 1 tablet by mouth nightly. 90 tablet 1   • clopidogrel (PLAVIX) 75 MG tablet Take 1 tablet by mouth daily. 90 tablet 1   • losartan (COZAAR) 50 MG tablet Take 1 tablet by mouth daily. 90 tablet 1   • Omega-3 Fatty Acids (FISH OIL) 1000 MG capsule Take 2 g by mouth daily.     • Cholecalciferol (VITAMIN D3) 2000 units capsule Take 2,000 Units by mouth daily.     • folic acid (FOLVITE) 800 MCG tablet Take 800 mcg by mouth 1 day a week. Tuesday.     • Pyridoxine HCl (VITAMIN B6 PO) Take 100 mg by mouth 1 day a week. Thursday.     • Cyanocobalamin (VITAMIN B-12 PO) Take 2,500 mcg by mouth 1 day a week. Wednesday.     • nitroGLYcerin (NITROSTAT) 0.4 MG sublingual tablet  PLACE 1 TABLET UNDER THE TONGUE EVERY 5 MINUTES AS NEEDED FOR CHEST PAIN. IF NO RELIEF AFTER 3 TABLETS, SEEK MEDICAL ATTENTION. 100 tablet 0   • acetaminophen (TYLENOL) 500 MG tablet Take 500 mg by mouth daily. Take an additional if needed for pain.     • aspirin 81 MG tablet Take 81 mg by mouth daily.     • clindamycin (CLEOCIN) 300 MG capsule Take 2 capsules one hour prior to dental procedure 2 capsule 2     No current facility-administered medications for this visit.         The following items on the Medicare Health Risk Assessment were found to be positive  6 a.) How many servings of Fruits and Vegetables do you have each day ( 1 serving = 1 piece of fruit, 1/2 cup fruits or vegetables):  1 per day     6 b.) How many servings of High Fiber / Whole Grain Foods to you have each day ( 1 serving = 1 cup cold cereal, 1/2 cup cooked cereal, 1 slice bread):  1 per day     11a.) Bladder Control problems (urine leaking):  Always     15.) How confident are you that you can control and manage most of your health problems?:  Somewhat confident         Vision and Hearing screens: not performed    Advance Directive:   The patient has the following documents:  Power of  for Health Care  State Do Not Resuscitate (DNR)    Cognitive Assessment: no evidence of cognitive dysfunction by direct observation and     Recent PHQ 2/9 Score    PHQ 2:  Date Adult PHQ 2 Score   5/1/2019 0       PHQ 9:       DEPRESSION ASSESSMENT/PLAN:  Depression screening is negative no further plan needed.     Body mass index is 24.08 kg/m².    BMI ASSESSMENT/PLAN:  Patient BMI is within normal range.     Needed Screening/Treatment:   Immunizations reviewed and patient needs: Herpes Zoster  Needed follow up:  None    See orders.   See Patient Instructions section.      Yes

## 2020-07-02 NOTE — OB PROVIDER TRIAGE NOTE - NS_SONONOTE_OBGYN_ALL_OB_FT
Unable to find measurable fluid pocket, pocket has umbilical cord. Findings communicated to MFM team

## 2020-07-02 NOTE — PERINATAL/NEONATAL BEREAVEMENT NOTE - DISPOSITION
pathology (less than 24 weeks) for autopsy and then hospital disposition/morgue (more than 24 weeks)

## 2020-07-03 ENCOUNTER — TRANSCRIPTION ENCOUNTER (OUTPATIENT)
Age: 30
End: 2020-07-03

## 2020-07-03 ENCOUNTER — RESULT REVIEW (OUTPATIENT)
Age: 30
End: 2020-07-03

## 2020-07-03 LAB
APTT BLD: 31.2 SEC — SIGNIFICANT CHANGE UP (ref 27–36.3)
INR BLD: 1.06 — SIGNIFICANT CHANGE UP (ref 0.88–1.17)
PROTHROM AB SERPL-ACNC: 12.2 SEC — SIGNIFICANT CHANGE UP (ref 9.8–13.1)
SARS-COV-2 RNA SPEC QL NAA+PROBE: SIGNIFICANT CHANGE UP

## 2020-07-03 PROCEDURE — 59409 OBSTETRICAL CARE: CPT | Mod: U7,UB,GC

## 2020-07-03 PROCEDURE — 93010 ELECTROCARDIOGRAM REPORT: CPT

## 2020-07-03 PROCEDURE — 88304 TISSUE EXAM BY PATHOLOGIST: CPT | Mod: 26

## 2020-07-03 PROCEDURE — 88309 TISSUE EXAM BY PATHOLOGIST: CPT | Mod: 26

## 2020-07-03 RX ORDER — IBUPROFEN 200 MG
600 TABLET ORAL EVERY 6 HOURS
Refills: 0 | Status: DISCONTINUED | OUTPATIENT
Start: 2020-07-03 | End: 2020-07-04

## 2020-07-03 RX ORDER — LANOLIN
1 OINTMENT (GRAM) TOPICAL EVERY 6 HOURS
Refills: 0 | Status: DISCONTINUED | OUTPATIENT
Start: 2020-07-03 | End: 2020-07-04

## 2020-07-03 RX ORDER — BENZOCAINE 10 %
1 GEL (GRAM) MUCOUS MEMBRANE EVERY 6 HOURS
Refills: 0 | Status: DISCONTINUED | OUTPATIENT
Start: 2020-07-03 | End: 2020-07-04

## 2020-07-03 RX ORDER — HYDROCORTISONE 1 %
1 OINTMENT (GRAM) TOPICAL EVERY 6 HOURS
Refills: 0 | Status: DISCONTINUED | OUTPATIENT
Start: 2020-07-03 | End: 2020-07-04

## 2020-07-03 RX ORDER — ACETAMINOPHEN 500 MG
975 TABLET ORAL ONCE
Refills: 0 | Status: COMPLETED | OUTPATIENT
Start: 2020-07-03 | End: 2020-07-03

## 2020-07-03 RX ORDER — ONDANSETRON 8 MG/1
4 TABLET, FILM COATED ORAL ONCE
Refills: 0 | Status: COMPLETED | OUTPATIENT
Start: 2020-07-03 | End: 2020-07-03

## 2020-07-03 RX ORDER — SIMETHICONE 80 MG/1
80 TABLET, CHEWABLE ORAL EVERY 4 HOURS
Refills: 0 | Status: DISCONTINUED | OUTPATIENT
Start: 2020-07-03 | End: 2020-07-04

## 2020-07-03 RX ORDER — OXYCODONE HYDROCHLORIDE 5 MG/1
5 TABLET ORAL
Refills: 0 | Status: DISCONTINUED | OUTPATIENT
Start: 2020-07-03 | End: 2020-07-04

## 2020-07-03 RX ORDER — GENTAMICIN SULFATE 40 MG/ML
300 VIAL (ML) INJECTION ONCE
Refills: 0 | Status: COMPLETED | OUTPATIENT
Start: 2020-07-03 | End: 2020-07-03

## 2020-07-03 RX ORDER — MAGNESIUM HYDROXIDE 400 MG/1
30 TABLET, CHEWABLE ORAL
Refills: 0 | Status: DISCONTINUED | OUTPATIENT
Start: 2020-07-03 | End: 2020-07-04

## 2020-07-03 RX ORDER — AMPICILLIN TRIHYDRATE 250 MG
2 CAPSULE ORAL EVERY 6 HOURS
Refills: 0 | Status: DISCONTINUED | OUTPATIENT
Start: 2020-07-03 | End: 2020-07-03

## 2020-07-03 RX ORDER — OXYCODONE HYDROCHLORIDE 5 MG/1
5 TABLET ORAL ONCE
Refills: 0 | Status: DISCONTINUED | OUTPATIENT
Start: 2020-07-03 | End: 2020-07-04

## 2020-07-03 RX ORDER — OXYTOCIN 10 UNIT/ML
333.33 VIAL (ML) INJECTION
Qty: 20 | Refills: 0 | Status: COMPLETED | OUTPATIENT
Start: 2020-07-03 | End: 2020-07-03

## 2020-07-03 RX ORDER — TETANUS TOXOID, REDUCED DIPHTHERIA TOXOID AND ACELLULAR PERTUSSIS VACCINE, ADSORBED 5; 2.5; 8; 8; 2.5 [IU]/.5ML; [IU]/.5ML; UG/.5ML; UG/.5ML; UG/.5ML
0.5 SUSPENSION INTRAMUSCULAR ONCE
Refills: 0 | Status: DISCONTINUED | OUTPATIENT
Start: 2020-07-03 | End: 2020-07-04

## 2020-07-03 RX ORDER — DIPHENHYDRAMINE HCL 50 MG
25 CAPSULE ORAL EVERY 6 HOURS
Refills: 0 | Status: DISCONTINUED | OUTPATIENT
Start: 2020-07-03 | End: 2020-07-04

## 2020-07-03 RX ORDER — IBUPROFEN 200 MG
600 TABLET ORAL EVERY 6 HOURS
Refills: 0 | Status: COMPLETED | OUTPATIENT
Start: 2020-07-03 | End: 2021-06-01

## 2020-07-03 RX ORDER — KETOROLAC TROMETHAMINE 30 MG/ML
30 SYRINGE (ML) INJECTION ONCE
Refills: 0 | Status: DISCONTINUED | OUTPATIENT
Start: 2020-07-03 | End: 2020-07-03

## 2020-07-03 RX ORDER — AMPICILLIN TRIHYDRATE 250 MG
2 CAPSULE ORAL ONCE
Refills: 0 | Status: COMPLETED | OUTPATIENT
Start: 2020-07-03 | End: 2020-07-03

## 2020-07-03 RX ORDER — FAMOTIDINE 10 MG/ML
20 INJECTION INTRAVENOUS ONCE
Refills: 0 | Status: COMPLETED | OUTPATIENT
Start: 2020-07-03 | End: 2020-07-03

## 2020-07-03 RX ORDER — DIBUCAINE 1 %
1 OINTMENT (GRAM) RECTAL EVERY 6 HOURS
Refills: 0 | Status: DISCONTINUED | OUTPATIENT
Start: 2020-07-03 | End: 2020-07-04

## 2020-07-03 RX ORDER — PRAMOXINE HYDROCHLORIDE 150 MG/15G
1 AEROSOL, FOAM RECTAL EVERY 4 HOURS
Refills: 0 | Status: DISCONTINUED | OUTPATIENT
Start: 2020-07-03 | End: 2020-07-04

## 2020-07-03 RX ORDER — SODIUM CHLORIDE 9 MG/ML
3 INJECTION INTRAMUSCULAR; INTRAVENOUS; SUBCUTANEOUS EVERY 8 HOURS
Refills: 0 | Status: DISCONTINUED | OUTPATIENT
Start: 2020-07-03 | End: 2020-07-04

## 2020-07-03 RX ORDER — AMPICILLIN TRIHYDRATE 250 MG
CAPSULE ORAL
Refills: 0 | Status: DISCONTINUED | OUTPATIENT
Start: 2020-07-03 | End: 2020-07-03

## 2020-07-03 RX ORDER — MORPHINE SULFATE 50 MG/1
4 CAPSULE, EXTENDED RELEASE ORAL ONCE
Refills: 0 | Status: DISCONTINUED | OUTPATIENT
Start: 2020-07-03 | End: 2020-07-04

## 2020-07-03 RX ORDER — HEPARIN SODIUM 5000 [USP'U]/ML
5000 INJECTION INTRAVENOUS; SUBCUTANEOUS EVERY 12 HOURS
Refills: 0 | Status: DISCONTINUED | OUTPATIENT
Start: 2020-07-03 | End: 2020-07-04

## 2020-07-03 RX ORDER — AER TRAVELER 0.5 G/1
1 SOLUTION RECTAL; TOPICAL EVERY 4 HOURS
Refills: 0 | Status: DISCONTINUED | OUTPATIENT
Start: 2020-07-03 | End: 2020-07-04

## 2020-07-03 RX ORDER — ACETAMINOPHEN 500 MG
975 TABLET ORAL
Refills: 0 | Status: DISCONTINUED | OUTPATIENT
Start: 2020-07-03 | End: 2020-07-04

## 2020-07-03 RX ADMIN — Medication 30 MILLIGRAM(S): at 10:29

## 2020-07-03 RX ADMIN — Medication 975 MILLIGRAM(S): at 16:40

## 2020-07-03 RX ADMIN — HEPARIN SODIUM 5000 UNIT(S): 5000 INJECTION INTRAVENOUS; SUBCUTANEOUS at 21:19

## 2020-07-03 RX ADMIN — Medication 500 MILLIGRAM(S): at 06:30

## 2020-07-03 RX ADMIN — SODIUM CHLORIDE 3 MILLILITER(S): 9 INJECTION INTRAMUSCULAR; INTRAVENOUS; SUBCUTANEOUS at 21:59

## 2020-07-03 RX ADMIN — Medication 975 MILLIGRAM(S): at 05:20

## 2020-07-03 RX ADMIN — Medication 1000 MILLIUNIT(S)/MIN: at 15:32

## 2020-07-03 RX ADMIN — Medication 600 MILLIGRAM(S): at 17:37

## 2020-07-03 RX ADMIN — SODIUM CHLORIDE 3 MILLILITER(S): 9 INJECTION INTRAMUSCULAR; INTRAVENOUS; SUBCUTANEOUS at 17:40

## 2020-07-03 RX ADMIN — Medication 216 GRAM(S): at 05:56

## 2020-07-03 RX ADMIN — ONDANSETRON 4 MILLIGRAM(S): 8 TABLET, FILM COATED ORAL at 03:22

## 2020-07-03 RX ADMIN — FAMOTIDINE 20 MILLIGRAM(S): 10 INJECTION INTRAVENOUS at 03:24

## 2020-07-03 NOTE — CHART NOTE - NSCHARTNOTEFT_GEN_A_CORE
Pt seen and assessed at bedside, pt is PPD0 from  of fetal demise 2/2 to PPROM at 20 wks, intrathecal epidural catheter placed during labor. 2 hours post delivery pt complaining of upper back pain. Pt reports the pain as sharp and stabbing between scapula. Denies abdominal cramping. Vaginal bleeding WNL     Of note, pt was able to sign consent forms calmly without complaining of pain    Vital Signs Last 24 Hrs  T(C): 36.5 (2020 09:04), Max: 37.2 (2020 12:30)  T(F): 97.7 (2020 09:04), Max: 99 (2020 12:30)  HR: 119 (2020 12:20) (76 - 141)  BP: 106/52 (2020 09:04) (99/52 - 126/72)  RR: 16 (2020 14:35) (16 - 16)  SpO2: 97% (2020 12:20) (90% - 100%)    Exam:  GA: crying, comforted by partner  Pulm: CTAB  Cards: RRR, S1 S2 WNL  Abd: appropirately tender to palpation, BS present  LE: no edema/tenderness   MUSK: tenderness not reproducible upon palpation of spine    A&P: 30 y/o  PPD0 from  of fetal demise s/p PPROM at 20 wks with sharp pain to the upper back. Pt is very emotional since admission, distractible from pain while signing consent forms.   -Discussed with anesthesia, will order Morphine 4mg IV for comfort  -Routine postpartum care      D/w Dr. Manav murillo pgy3

## 2020-07-03 NOTE — DISCHARGE NOTE OB - PROVIDER TOKENS
FREE:[LAST:[[facility]],PHONE:[phone]],FAX:[(   )    -],ADDRESS:[[facility]  Oncology [facility] Floor]]

## 2020-07-03 NOTE — CHART NOTE - NSCHARTNOTEFT_GEN_A_CORE
R4 OB Chart Note    Pt seen and evaluated at bedside for worsening ctx pain in setting of epidural being turned off due to persistent back pain.  Per prior notes, pt reports electric like back pain that comes and goes every 5min or so and seems to correlate with her ctx.  She reports improvement in the pain with hot packs and massage.  Pt seen with Dr. Stephenson, anesthesia attg, at bedside.    SVE 2/60/-3.  [id number], dose #3 placed on exam.      plan  -continue iol  -anesthesia restarted epidural at lower dose  -tylenol 975mg po for back pain    d/w Dr. Marky De Jesus MD PGY4 R4 OB Chart Note    Pt seen and evaluated at bedside for worsening ctx pain in setting of epidural being turned off due to persistent back pain.  Per prior notes, pt reports electric like back pain that comes and goes every 5min or so and seems to correlate with her ctx.  She reports improvement in the pain with hot packs and massage.  Pt seen with Dr. Stephenson, anesthesia attg, at bedside.    SVE 2/60/-2.  [id number], dose #3 placed on exam.      plan  -continue iol  -anesthesia restarted epidural at lower dose  -tylenol 975mg po for back pain    d/w Dr. Marky De Jesus MD PGY4

## 2020-07-03 NOTE — DISCHARGE NOTE OB - CARE PROVIDER_API CALL
[facility],   [facility]  Oncology Building  Basement Floor  Phone: [phone]  Fax: (   )    -  Follow Up Time:

## 2020-07-03 NOTE — DISCHARGE NOTE OB - MEDICATION SUMMARY - MEDICATIONS TO TAKE
I will START or STAY ON the medications listed below when I get home from the hospital:    acetaminophen 325 mg oral tablet  -- 3 tab(s) by mouth every 6 hours   -- Indication: For Vaginal delivery    ibuprofen 600 mg oral tablet  -- 1 tab(s) by mouth every 6 hours  -- Indication: For Vaginal delivery

## 2020-07-03 NOTE — OB RN DELIVERY SUMMARY - NS_SEROLOGYDONE_OBGYN_ALL_OB
Need more information from [employer]. I called and spoke with Zeina Mcnamara, who states I need to speak with Sigifredo Griffin who was not available. LM requesting Sigifredo Griffin return call to office. No

## 2020-07-03 NOTE — CHART NOTE - NSCHARTNOTEFT_GEN_A_CORE
R4 OB Chart Note    Pt evaluated at bedside with Dr. Negro for chest discomfort.  Pt c/o tingling chest discomfort over her sternum and in the middle of her spine from her thorax to the base of her neck.  She denies dizziness, sob, chest pain/pressure.  She has been nauseated.  Per anesthesia, her epidural was placed intrathecally.      Vital Signs Last 24 Hrs  T(C): 37.0 (03 Jul 2020 03:33), Max: 37.0 (02 Jul 2020 23:25)  T(F): 98.6 (03 Jul 2020 03:33), Max: 98.6 (02 Jul 2020 23:25)  HR: 102 (03 Jul 2020 03:45) (75 - 139)  BP: 106/58 (03 Jul 2020 03:37) (79/38 - 120/61)  RR: 16 (02 Jul 2020 18:15) (16 - 16)  SpO2: 94% (03 Jul 2020 03:45) (75% - 100%)    NAD, A&Ox3  CV RRR  PULM CTAB  ABD soft, nt, gravid  SVE deferred                          11.0   17.79 )-----------( 227      ( 02 Jul 2020 18:40 )             33.1    Plan  -continue IOL w/ cytotec  -pepcid/zofran for gerd/nausea  -ekg    d/w Dr. Marky De Jesus MD PGY4 R4 OB Chart Note    Pt evaluated at bedside with Dr. Negro for chest discomfort.  Pt c/o tingling chest discomfort over her sternum and in the middle of her spine from her thorax to the base of her neck.  She denies dizziness, sob, chest pain/pressure.  She has been nauseated.  Per anesthesia, her epidural was placed intrathecally.      Vital Signs Last 24 Hrs  T(C): 37.0 (03 Jul 2020 03:33), Max: 37.0 (02 Jul 2020 23:25)  T(F): 98.6 (03 Jul 2020 03:33), Max: 98.6 (02 Jul 2020 23:25)  HR: 102 (03 Jul 2020 03:45) (75 - 139)  BP: 106/58 (03 Jul 2020 03:37) (79/38 - 120/61)  RR: 16 (02 Jul 2020 18:15) (16 - 16)  SpO2: 94% (03 Jul 2020 03:45) (75% - 100%)    NAD, A&Ox3  CV RRR  PULM CTAB  ABD soft, nt, gravid  SVE deferred                          11.0   17.79 )-----------( 227      ( 02 Jul 2020 18:40 )             33.1    Plan  -continue IOL w/ cytotec  -pepcid/zofran for gerd/nausea  -ekg    d/w Dr. Marky De Jesus MD PGY4      ADDENDUM:  EKG sinus tachy w/ , nonspecific T wave abnormality.  Pt reports substernal tingling is better but she still has pain in her back.    Anesthesia to evaluate pt.    LEXY De Jesus MD PGY4

## 2020-07-03 NOTE — DISCHARGE NOTE OB - COMMUNITY RESOURCE NAME:
Patient and spouse instructed to make a postpartum follow up appointment at the Ambulatory Care Unit at [facility]  2-3 weeks after her delivery date

## 2020-07-03 NOTE — DISCHARGE NOTE OB - CARE PLAN
Principal Discharge DX:	Premature rupture of membranes in second trimester, unspecified duration to onset of labor  Goal:	well being  Assessment and plan of treatment:	After discharge, please stay on pelvic rest for 6 weeks, meaning no sexual intercourse, no tampons and no douching.  No driving for 2 weeks as women can lose a lot of blood during delivery and there is a possibility of being lightheaded/fainting.  Expect to have vaginal bleeding/spotting for up to six weeks.  The bleeding should get lighter and more white/light brown with time.  For bleeding soaking more than a pad an hour or passing clots greater than the size of your fist, come in to the emergency department.    Follow up in clinic in 6 weeks.

## 2020-07-03 NOTE — DISCHARGE NOTE OB - HOSPITAL COURSE
Patient was admitted with  prelabor rupture of membranes. Patient opted for an induction of labor. Patient had a spontaneous delivery of fetus and placenta. She was seen by social work post delivery.

## 2020-07-03 NOTE — OB PROVIDER DELIVERY SUMMARY - NSPROVIDERDELIVERYNOTE_OBGYN_ALL_OB_FT
Notified of pt feeling rectal pressure, fetal vertex noted at the perineum. Fetus and placenta delivered spontaneously together with subsequent contraction. No fetal heart rate noted upon delivery. Pitocin started. Perineum intact. No bleeding noted. Fetus swaddled and passed to mother. Notified of pt feeling rectal pressure, fetal vertex noted at the perineum. Fetus and placenta delivered spontaneously together with subsequent contraction. No fetal heart rate noted upon delivery. Pitocin started. Perineum intact. No bleeding noted. Fetus swaddled and passed to mother.    I was present for and agree with above  SUKHJINDER Em

## 2020-07-03 NOTE — CHART NOTE - NSCHARTNOTEFT_GEN_A_CORE
Pt seen and evaluated at bedside for complaints of sharp stabbing pain between shoulder bladers.  Pt states pain is intermittent and intense in nature with onset.  Pain radiates from the center of the back outwards.  Chest pain reported earlier resolved.  Denies SOB, abdominal pain, dizziness, nausea.    Vital Signs Last 24 Hrs  T(C): 37.0 (03 Jul 2020 03:33), Max: 37.0 (02 Jul 2020 23:25)  T(F): 98.6 (03 Jul 2020 03:33), Max: 98.6 (02 Jul 2020 23:25)  HR: 100 (03 Jul 2020 04:51) (75 - 139)  BP: 103/59 (03 Jul 2020 04:51) (79/38 - 120/61)  BP(mean): --  RR: 16 (02 Jul 2020 18:15) (16 - 16)  SpO2: 96% (03 Jul 2020 04:49) (75% - 100%)    Gen: distressed and uncomfortable  CV: RRR, S1/S2  Pulm: CTABL  Abd: soft, non-tender  Back: no point tenderness    Plan:   -pt evaluated by epidural, epidural turned off  -hot packs for comfort  -pain likely related to intrathecal catheter    d/w Dr. Marky Rausch PGY2

## 2020-07-03 NOTE — DISCHARGE NOTE OB - PATIENT PORTAL LINK FT
You can access the FollowMyHealth Patient Portal offered by [facility] by registering at the following website: [email or web address]. By joining [employer]’s FollowMyHealth portal, you will also be able to view your health information using other applications (apps) compatible with our system.

## 2020-07-03 NOTE — DISCHARGE NOTE OB - PLAN OF CARE
well being After discharge, please stay on pelvic rest for 6 weeks, meaning no sexual intercourse, no tampons and no douching.  No driving for 2 weeks as women can lose a lot of blood during delivery and there is a possibility of being lightheaded/fainting.  Expect to have vaginal bleeding/spotting for up to six weeks.  The bleeding should get lighter and more white/light brown with time.  For bleeding soaking more than a pad an hour or passing clots greater than the size of your fist, come in to the emergency department.    Follow up in clinic in 6 weeks.

## 2020-07-04 VITALS
DIASTOLIC BLOOD PRESSURE: 59 MMHG | OXYGEN SATURATION: 99 % | SYSTOLIC BLOOD PRESSURE: 98 MMHG | HEART RATE: 73 BPM | RESPIRATION RATE: 16 BRPM | TEMPERATURE: 98 F

## 2020-07-04 RX ORDER — IBUPROFEN 200 MG
1 TABLET ORAL
Qty: 120 | Refills: 0
Start: 2020-07-04 | End: 2020-08-02

## 2020-07-04 RX ORDER — ACETAMINOPHEN 500 MG
3 TABLET ORAL
Qty: 360 | Refills: 0
Start: 2020-07-04 | End: 2020-08-02

## 2020-07-04 RX ADMIN — SODIUM CHLORIDE 3 MILLILITER(S): 9 INJECTION INTRAMUSCULAR; INTRAVENOUS; SUBCUTANEOUS at 14:02

## 2020-07-04 RX ADMIN — HEPARIN SODIUM 5000 UNIT(S): 5000 INJECTION INTRAVENOUS; SUBCUTANEOUS at 10:36

## 2020-07-04 RX ADMIN — Medication 975 MILLIGRAM(S): at 01:16

## 2020-07-04 RX ADMIN — Medication 975 MILLIGRAM(S): at 06:44

## 2020-07-04 RX ADMIN — Medication 600 MILLIGRAM(S): at 13:38

## 2020-07-04 RX ADMIN — Medication 25 MILLIGRAM(S): at 06:47

## 2020-07-04 NOTE — PROGRESS NOTE ADULT - ASSESSMENT
29y/o  PPD#1 from  s/p PPROM at 20w and fetal demise.  complicated by intrathecal catheter and IPT of 3.84 in the setting of PO cytotec.  Afebrile since then, VSS. Pain is well controlled but patient is having difficulty sleeping. Patient is concerned about future pregnancy.

## 2020-07-04 NOTE — PROGRESS NOTE ADULT - PROBLEM SELECTOR PLAN 1
- Continue with po analgesia  - Increase ambulation  - Continue regular diet  - Social work to follow up before d/c  - Continue to monitor vitals.     Rosaura Sesay  PGY-1

## 2020-07-04 NOTE — PROGRESS NOTE ADULT - SUBJECTIVE AND OBJECTIVE BOX
Postop Day  1  s/p   labor epidural complicated with "wet tap"      Subjective: no major complaints, PDPH improving, patient ambulates, no CN simptoms  Pain scale score: at rest _3_, with activity _6_    Sedation Score:	  [x  ] Alert	    [  ] Drowsy        [  ] Arousable	[  ] Asleep	[  ] Unresponsive    Side Effects:	  [ x ] None	     [  ] Nausea        [  ] Pruritus        [  ] Weakness   [  ] Numbness      Objective: ambulates,  back non-tender, gross neuro exam normal    T(C): 36.8 (07-04-20 @ 10:08), Max: 37.1 (07-03-20 @ 16:13)  HR: 81 (07-04-20 @ 10:08) (77 - 112)  BP: 81/52 (07-04-20 @ 10:08) (81/52 - 115/70)  RR: 16 (07-04-20 @ 10:08) (15 - 17)  SpO2: 98% (07-04-20 @ 10:08) (93% - 100%)  Wt(kg): --    ASSESSMENT/ PLAN   [ x ] Continue PRN analgesics  [ x ]Documentation and Verification of current medications     acetaminophen   Tablet .. 975 milliGRAM(s) Oral <User Schedule>  benzocaine 20%/menthol 0.5% Spray 1 Spray(s) Topical every 6 hours PRN  dibucaine 1% Ointment 1 Application(s) Topical every 6 hours PRN  diphenhydrAMINE 25 milliGRAM(s) Oral every 6 hours PRN  diphtheria/tetanus/pertussis (acellular) Vaccine (ADAcel) 0.5 milliLiter(s) IntraMuscular once  heparin   Injectable 5000 Unit(s) SubCutaneous every 12 hours  hydrocortisone 1% Cream 1 Application(s) Topical every 6 hours PRN  ibuprofen  Tablet. 600 milliGRAM(s) Oral every 6 hours  lactated ringers. 1000 milliLiter(s) IV Continuous <Continuous>  lanolin Ointment 1 Application(s) Topical every 6 hours PRN  magnesium hydroxide Suspension 30 milliLiter(s) Oral two times a day PRN  morphine  - Injectable 4 milliGRAM(s) IV Push once  oxyCODONE    IR 5 milliGRAM(s) Oral every 3 hours PRN  oxyCODONE    IR 5 milliGRAM(s) Oral once PRN  pramoxine 1%/zinc 5% Cream 1 Application(s) Topical every 4 hours PRN  prenatal multivitamin 1 Tablet(s) Oral daily  simethicone 80 milliGRAM(s) Chew every 4 hours PRN  sodium chloride 0.9% lock flush 3 milliLiter(s) IV Push every 8 hours  sodium chloride 0.9% lock flush 3 milliLiter(s) IV Push every 8 hours  witch hazel Pads 1 Application(s) Topical every 4 hours PRN      Comments: Continue current regimen, "blood patch" discussed but not indicated at this time. Patient advised to report back to the hospital if headache worsen
Patient seen and examined at bedside, no acute overnight events. No acute complaints, pain well controlled. Patient is ambulating, voiding spontaneously, passing gas, tolerating regular diet, although has poor appetite.     Vital Signs Last 24 Hours  T(C): 36.8 (07-04-20 @ 05:10), Max: 37.1 (07-03-20 @ 16:13)  HR: 77 (07-04-20 @ 05:10) (77 - 124)  BP: 95/53 (07-04-20 @ 05:10) (79/43 - 115/70)  RR: 15 (07-04-20 @ 05:10) (15 - 17)  SpO2: 98% (07-04-20 @ 05:10) (93% - 100%)    Physical Exam:  General: NAD  Abdomen: Soft, non-tender, non-distended, fundus firml    Labs:    Blood Type: A Positive  Antibody Screen: Negative               11.0   17.79 )-----------( 227      ( 07-02 @ 18:40 )             33.1         MEDICATIONS  (STANDING):  acetaminophen   Tablet .. 975 milliGRAM(s) Oral <User Schedule>  diphtheria/tetanus/pertussis (acellular) Vaccine (ADAcel) 0.5 milliLiter(s) IntraMuscular once  heparin   Injectable 5000 Unit(s) SubCutaneous every 12 hours  ibuprofen  Tablet. 600 milliGRAM(s) Oral every 6 hours  lactated ringers. 1000 milliLiter(s) (125 mL/Hr) IV Continuous <Continuous>  morphine  - Injectable 4 milliGRAM(s) IV Push once  prenatal multivitamin 1 Tablet(s) Oral daily  sodium chloride 0.9% lock flush 3 milliLiter(s) IV Push every 8 hours  sodium chloride 0.9% lock flush 3 milliLiter(s) IV Push every 8 hours    MEDICATIONS  (PRN):  benzocaine 20%/menthol 0.5% Spray 1 Spray(s) Topical every 6 hours PRN for Perineal discomfort  dibucaine 1% Ointment 1 Application(s) Topical every 6 hours PRN Perineal discomfort  diphenhydrAMINE 25 milliGRAM(s) Oral every 6 hours PRN Pruritus  hydrocortisone 1% Cream 1 Application(s) Topical every 6 hours PRN Moderate Pain (4-6)  lanolin Ointment 1 Application(s) Topical every 6 hours PRN nipple soreness  magnesium hydroxide Suspension 30 milliLiter(s) Oral two times a day PRN Constipation  oxyCODONE    IR 5 milliGRAM(s) Oral every 3 hours PRN Moderate to Severe Pain (4-10)  oxyCODONE    IR 5 milliGRAM(s) Oral once PRN Moderate to Severe Pain (4-10)  pramoxine 1%/zinc 5% Cream 1 Application(s) Topical every 4 hours PRN Moderate Pain (4-6)  simethicone 80 milliGRAM(s) Chew every 4 hours PRN Gas  witch hazel Pads 1 Application(s) Topical every 4 hours PRN Perineal discomfort

## 2020-07-07 DIAGNOSIS — Z34.80 ENCOUNTER FOR SUPERVISION OF OTHER NORMAL PREGNANCY, UNSPECIFIED TRIMESTER: ICD-10-CM

## 2020-07-15 ENCOUNTER — EMERGENCY (EMERGENCY)
Facility: HOSPITAL | Age: 30
LOS: 1 days | Discharge: HOME | End: 2020-07-15
Attending: EMERGENCY MEDICINE | Admitting: EMERGENCY MEDICINE
Payer: MEDICAID

## 2020-07-15 VITALS
RESPIRATION RATE: 18 BRPM | DIASTOLIC BLOOD PRESSURE: 64 MMHG | HEART RATE: 81 BPM | SYSTOLIC BLOOD PRESSURE: 92 MMHG | OXYGEN SATURATION: 98 % | TEMPERATURE: 98 F

## 2020-07-15 VITALS
DIASTOLIC BLOOD PRESSURE: 52 MMHG | OXYGEN SATURATION: 100 % | SYSTOLIC BLOOD PRESSURE: 94 MMHG | TEMPERATURE: 98 F | HEART RATE: 79 BPM | RESPIRATION RATE: 16 BRPM

## 2020-07-15 LAB
ALBUMIN SERPL ELPH-MCNC: 4.3 G/DL — SIGNIFICANT CHANGE UP (ref 3.3–5)
ALP SERPL-CCNC: 70 U/L — SIGNIFICANT CHANGE UP (ref 40–120)
ALT FLD-CCNC: 14 U/L — SIGNIFICANT CHANGE UP (ref 4–33)
ANION GAP SERPL CALC-SCNC: 14 MMO/L — SIGNIFICANT CHANGE UP (ref 7–14)
APTT BLD: 30.8 SEC — SIGNIFICANT CHANGE UP (ref 27–36.3)
AST SERPL-CCNC: 24 U/L — SIGNIFICANT CHANGE UP (ref 4–32)
BILIRUB SERPL-MCNC: < 0.2 MG/DL — LOW (ref 0.2–1.2)
BUN SERPL-MCNC: 15 MG/DL — SIGNIFICANT CHANGE UP (ref 7–23)
CALCIUM SERPL-MCNC: 9.5 MG/DL — SIGNIFICANT CHANGE UP (ref 8.4–10.5)
CHLORIDE SERPL-SCNC: 102 MMOL/L — SIGNIFICANT CHANGE UP (ref 98–107)
CO2 SERPL-SCNC: 26 MMOL/L — SIGNIFICANT CHANGE UP (ref 22–31)
CREAT SERPL-MCNC: 0.68 MG/DL — SIGNIFICANT CHANGE UP (ref 0.5–1.3)
GLUCOSE SERPL-MCNC: 108 MG/DL — HIGH (ref 70–99)
HCT VFR BLD CALC: 39.1 % — SIGNIFICANT CHANGE UP (ref 34.5–45)
HGB BLD-MCNC: 12.8 G/DL — SIGNIFICANT CHANGE UP (ref 11.5–15.5)
INR BLD: 1.03 — SIGNIFICANT CHANGE UP (ref 0.88–1.17)
MAGNESIUM SERPL-MCNC: 2.1 MG/DL — SIGNIFICANT CHANGE UP (ref 1.6–2.6)
MCHC RBC-ENTMCNC: 28.1 PG — SIGNIFICANT CHANGE UP (ref 27–34)
MCHC RBC-ENTMCNC: 32.7 % — SIGNIFICANT CHANGE UP (ref 32–36)
MCV RBC AUTO: 85.9 FL — SIGNIFICANT CHANGE UP (ref 80–100)
NRBC # FLD: 0 K/UL — SIGNIFICANT CHANGE UP (ref 0–0)
PHOSPHATE SERPL-MCNC: 3.5 MG/DL — SIGNIFICANT CHANGE UP (ref 2.5–4.5)
PLATELET # BLD AUTO: 312 K/UL — SIGNIFICANT CHANGE UP (ref 150–400)
PMV BLD: 10 FL — SIGNIFICANT CHANGE UP (ref 7–13)
POTASSIUM SERPL-MCNC: 4.2 MMOL/L — SIGNIFICANT CHANGE UP (ref 3.5–5.3)
POTASSIUM SERPL-SCNC: 4.2 MMOL/L — SIGNIFICANT CHANGE UP (ref 3.5–5.3)
PROT SERPL-MCNC: 7.7 G/DL — SIGNIFICANT CHANGE UP (ref 6–8.3)
PROTHROM AB SERPL-ACNC: 11.8 SEC — SIGNIFICANT CHANGE UP (ref 9.8–13.1)
RBC # BLD: 4.55 M/UL — SIGNIFICANT CHANGE UP (ref 3.8–5.2)
RBC # FLD: 13.2 % — SIGNIFICANT CHANGE UP (ref 10.3–14.5)
SODIUM SERPL-SCNC: 142 MMOL/L — SIGNIFICANT CHANGE UP (ref 135–145)
WBC # BLD: 10.19 K/UL — SIGNIFICANT CHANGE UP (ref 3.8–10.5)
WBC # FLD AUTO: 10.19 K/UL — SIGNIFICANT CHANGE UP (ref 3.8–10.5)

## 2020-07-15 PROCEDURE — 71275 CT ANGIOGRAPHY CHEST: CPT | Mod: 26

## 2020-07-15 PROCEDURE — 93010 ELECTROCARDIOGRAM REPORT: CPT

## 2020-07-15 PROCEDURE — 99284 EMERGENCY DEPT VISIT MOD MDM: CPT | Mod: 25

## 2020-07-15 RX ORDER — ACETAMINOPHEN 500 MG
650 TABLET ORAL ONCE
Refills: 0 | Status: COMPLETED | OUTPATIENT
Start: 2020-07-15 | End: 2020-07-15

## 2020-07-15 RX ADMIN — Medication 650 MILLIGRAM(S): at 09:01

## 2020-07-15 NOTE — ED ADULT NURSE REASSESSMENT NOTE - NS ED NURSE REASSESS COMMENT FT1
Assumed care, Pt asleep appears comfortable in stretcher, arousable to verbal stimuli, VSS. Will continue to monitor. Awaiting test results and dispo.

## 2020-07-15 NOTE — ED PROVIDER NOTE - ATTENDING CONTRIBUTION TO CARE
I performed a face-to-face evaluation of the patient and performed a history and physical examination. I agree with the history and physical examination.    Recent miscarriage. Feeling guilty, depressed, and anxious. When she cries, she gets SOB. HEART score low. Not likely PE or other VTE: PERC or Wells low score, EKG no e/o R-heart strain. Not likely PNA: no infectious Sx (eg, purulent cough). Likely adjustment disorder. Will eval for medical causes of SOB (EKG, Hb, CXR). If unremarkable, dc w/ Gyn f/u and [facility] info.

## 2020-07-15 NOTE — ED ADULT NURSE NOTE - CHIEF COMPLAINT QUOTE
pt arrives from home....as per EMT..Sydni speaking pt reports miscarried last week then this am felt anxious and short of breath." As per Interpretation Pt confirms " I was sleeping and noticed I was having difficulty breathing. No pain...I feel stressed...my baby was 20 weeks."Pt speaking broken English... st" I have not slept in 24 hours...I keep thinking my fault...what did I do...I was at Md office one minute everything was fine my baby was strong and next minute I delivered and baby was not alive."  Pt very tearful during triage. Talking on cell phone to various family members during attempts to utilize interpretation services.... appears to become more emotional. Denies Med hx.

## 2020-07-15 NOTE — ED PROVIDER NOTE - ADDITIONAL RISK FACTOR FREE TEXT BOX
Ms. Mejia is a 29 yo F one week following a miscarriage presenting with anxiety and depressed mood associated with dyspnea. This is likely a normal grief reaction. Will rule out organic causes with EKG, CXR, CBC, and CMP. EKG is unremarkable, ruling against cardiac etiologies. Also, no chest pain. Will obtain cxr, but very low concern for consolidation causing dyspnea. CBC for hemoglobin given vaginal bleeding and to look for signs  of infection. Will f/u with gyn and [facility] crisis center.

## 2020-07-15 NOTE — ED PROVIDER NOTE - PROGRESS NOTE DETAILS
PT states she awoke this morning due to feeling short of breath. Discussed with  Edison @ [phone]. States went outside but symptoms persisted. Contacted EMS due to SOB. Evaluated bedside. Observed resting comfortably, NAD and non-toxic    PA Pham Signout received , Pt reports SOB is better. EKG reviewed showing T wave inversions in lateral leads v4-v6  and III, new from prior ekg. W/ no LE edema, but recent D&C w/ at 20 weeks w/ SOB. Will r/o PE as possible cause of SOB, trop, probnp, pt/inr Pt found outside with . Attempted to elope. Pt A&OX4 does not wish to await results of CTA. SP02 100%, denies SOB or chest pain. States she is scared to be alone. Will contact with positive results. Low suspicion for PE. Discussed with attending. Will provided outpatient crisis center and obgyn recommnedations. Return precautions provided Pt found outside with . Attempted to elope. Pt A&OX4 does not wish to await results of CTA. SP02 100%, denies SOB or chest pain. States she is scared to be alone. Denies SI, HI. Will contact with positive results. Low suspicion for PE. Discussed with attending. Will provided outpatient crisis center and obgyn referral. Return precautions provided

## 2020-07-15 NOTE — ED ADULT NURSE NOTE - OBJECTIVE STATEMENT
30 yr old female pt presents to ED for evaluation of shortness of breath. pt axox4 states she woke up from sleep with severe shortness of breath, pt endorses that she recently lost a child during the birthing process and has been feeling anxious and depressed since. pt appears very anxious, tachypneic, and is observed crying. denies any chest pain, dizziness, headache, blurry vision, admits to being very hurt and depressed over the loss of her child but denies si/hi. pt awaiting provider evaluation and further orders. will monitor. 30 yr old female pt presents to ED for evaluation of shortness of breath. pt axox4 states she woke up from sleep with severe shortness of breath, pt endorses that she had a miscarriage last week and has been feeling anxious, depressed and unable to sleep since. pt appears very anxious, tachypneic, and is observed crying. denies any chest pain, dizziness, headache, blurry vision, admits to being very hurt and depressed over the loss of her child but denies si/hi. pt awaiting provider evaluation and further orders. will monitor.

## 2020-07-15 NOTE — ED PROVIDER NOTE - OBJECTIVE STATEMENT
Ms. Mejia is a 29 yo F presenting with shortness of breath and anxiety following miscarriage at 20 weeks. She was sent in from clinic on  for premature rupture of membranes underwent  followed by fetal demise. She has been unable to sleep over the last 24 hours. She tried to calm herself down by drinking a small amount of wine, but then she began to feel short of breath. She has been distressed because she is alone at home. It is hardest to breathe when she cries. She has a dry cough. She has vaginal bleeding she says is comparable to her normal periods. She has cramping abdominal pain she also finds normal in comparison to her normal periods. There is no purulent vaginal discharge. She has lower abdominal pain with urination and bowel movements. Her main concern is knowing if this will happen again and the specifics of why she miscarried.

## 2020-07-15 NOTE — ED PROVIDER NOTE - NS ED ROS FT
REVIEW OF SYSTEMS:    CONSTITUTIONAL: Fever for first 2 days following discharge. None since then.  EYES/ENT: No visual changes;  No vertigo or throat pain   NECK: No pain or stiffness  RESPIRATORY: No cough, wheezing, hemoptysis; No shortness of breath  CARDIOVASCULAR: No chest pain or palpitations  GASTROINTESTINAL: No abdominal pain; nausea, vomiting;  diarrhea or constipation. No hemetemesis, melena or hematochezia.  GENITOURINARY: +dysuria. -hematuria  MSK: Right upper back pain  NEUROLOGICAL: No numbness or weakness  SKIN: No itching, burning, rashes, or lesions   All other review of systems is negative unless indicated above. REVIEW OF SYSTEMS:    CONSTITUTIONAL: Fever for first 2 days following discharge. None since then.  EYES/ENT: No visual changes;  No vertigo or throat pain   NECK: No pain or stiffness  RESPIRATORY: No cough, wheezing, hemoptysis; No shortness of breath  CARDIOVASCULAR: No chest pain or palpitations  GASTROINTESTINAL: No abdominal pain; nausea, vomiting;  diarrhea or constipation. No hemetemesis, melena or hematochezia.  GENITOURINARY: +dysuria. -hematuria  MSK: Right upper back pain  NEUROLOGICAL: No numbness or weakness  SKIN: No itching, burning, rashes, or lesions   PSYCH: No SI or HI or passive SI  All other review of systems is negative unless indicated above.

## 2020-07-15 NOTE — ED ADULT NURSE NOTE - NSIMPLEMENTINTERV_GEN_ALL_ED
Implemented All Universal Safety Interventions:  [place] to call system. Call bell, personal items and telephone within reach. Instruct patient to call for assistance. Room bathroom lighting operational. Non-slip footwear when patient is off stretcher. Physically safe environment: no spills, clutter or unnecessary equipment. Stretcher in lowest position, wheels locked, appropriate side rails in place.

## 2020-07-15 NOTE — ED PROVIDER NOTE - CLINICAL SUMMARY MEDICAL DECISION MAKING FREE TEXT BOX
Raghav: Recent miscarriage. Feeling guilty, depressed, and anxious. When she cries, she gets SOB. HEART score low. Not likely PE or other VTE: PERC or Wells low score, EKG no e/o R-heart strain. Not likely PNA: no infectious Sx (eg, purulent cough). Likely adjustment disorder. Will eval for medical causes of SOB (EKG, Hb, CXR). If unremarkable, dc w/ Gyn f/u and [facility] info.

## 2020-07-15 NOTE — ED ADULT NURSE REASSESSMENT NOTE - NS ED NURSE REASSESS COMMENT FT1
Pt found to have left the ED and found in the outdoor tent with PIV intact, Pt had called  and told him she was discharged,  informed pt is not discharged yet at this time, Pt pending CT results,  informed. Pt brought back to room 8 tearful refusing to disclose why, requesting to leave AMA, MD and PA informed. PIV discontinued per NEREIDA Abebe at this time, Pt to leave AMA.

## 2020-07-15 NOTE — ED PROVIDER NOTE - PHYSICAL EXAMINATION
PHYSICAL EXAM:    General: No acute distress.  HEENT: NCAT.  PERRL.  EOMI.  No scleral icterus or injection.  Moist MM.  No oropharyngeal exudates.    Neck: Supple.  Full ROM.  No JVD.  No thyromegaly. No lymphadenopathy.   Heart: RRR.  Normal S1 and S2.  No murmurs, rubs, or gallops.   Lungs: CTAB. No wheezes, crackles, or rhonchi.    Abdomen: BS+, soft, NT/ND.  No organomegaly. No lower quadrant tenderness.  Skin: Warm and dry.  No rashes.  Extremities: No edema, clubbing, or cyanosis.  2+ peripheral pulses b/l.  Musculoskeletal: No deformities.  No spinal or paraspinal tenderness.  Neuro: Alert and appropriately conversant  Psych: Tearful affect. Depressed and anxious mood.

## 2020-07-15 NOTE — ED PROVIDER NOTE - PATIENT PORTAL LINK FT
You can access the FollowMyHealth Patient Portal offered by [facility] by registering at the following website: [email or web address]. By joining [employer]’s FollowMyHealth portal, you will also be able to view your health information using other applications (apps) compatible with our system.

## 2020-07-15 NOTE — ED PROVIDER NOTE - NSFOLLOWUPINSTRUCTIONS_ED_ALL_ED_FT
Shortness of breath    Shortness of breath (dyspnea) means you have trouble breathing and could indicate a medical problem. Causes include lung disease, heart disease, low amount of red blood cells (anemia), poor physical fitness, being overweight, smoking, etc. Your health care provider today may not be able to find a cause for your shortness of breath after your exam. In this case, it is important to have a follow-up exam with your primary care physician as instructed. If medicines were prescribed, take them as directed for the full length of time directed. Refrain from tobacco products.    SEEK IMMEDIATE MEDICAL CARE IF YOU HAVE ANY OF THE FOLLOWING SYMPTOMS: worsening shortness of breath, chest pain, back pain, abdominal pain, fever, coughing up blood, lightheadedness/dizziness.    Please follow up mental health for further evaluation. Follow up with OBGYN for further management. We have provided the necessary referrals

## 2020-07-24 LAB — SURGICAL PATHOLOGY STUDY: SIGNIFICANT CHANGE UP

## 2020-09-01 PROBLEM — B37.3 CANDIDIASIS OF VULVA AND VAGINA: Chronic | Status: ACTIVE | Noted: 2019-02-01

## 2020-09-01 PROBLEM — N39.0 URINARY TRACT INFECTION, SITE NOT SPECIFIED: Chronic | Status: ACTIVE | Noted: 2020-03-02

## 2020-09-15 ENCOUNTER — APPOINTMENT (OUTPATIENT)
Dept: ANTEPARTUM | Facility: CLINIC | Age: 30
End: 2020-09-15
Payer: MEDICAID

## 2020-09-15 ENCOUNTER — ASOB RESULT (OUTPATIENT)
Age: 30
End: 2020-09-15

## 2020-09-15 PROCEDURE — 99204 OFFICE O/P NEW MOD 45 MIN: CPT | Mod: TH,95

## 2020-09-28 ENCOUNTER — TRANSCRIPTION ENCOUNTER (OUTPATIENT)
Age: 30
End: 2020-09-28

## 2020-11-29 ENCOUNTER — EMERGENCY (EMERGENCY)
Facility: HOSPITAL | Age: 30
LOS: 1 days | Discharge: HOME | End: 2020-11-29
Attending: STUDENT IN AN ORGANIZED HEALTH CARE EDUCATION/TRAINING PROGRAM | Admitting: STUDENT IN AN ORGANIZED HEALTH CARE EDUCATION/TRAINING PROGRAM
Payer: MEDICAID

## 2020-11-29 VITALS
HEART RATE: 80 BPM | RESPIRATION RATE: 16 BRPM | HEIGHT: 63 IN | DIASTOLIC BLOOD PRESSURE: 72 MMHG | TEMPERATURE: 98 F | SYSTOLIC BLOOD PRESSURE: 98 MMHG | OXYGEN SATURATION: 100 %

## 2020-11-29 PROCEDURE — 10060 I&D ABSCESS SIMPLE/SINGLE: CPT

## 2020-11-29 PROCEDURE — 99283 EMERGENCY DEPT VISIT LOW MDM: CPT | Mod: 25

## 2020-11-29 RX ORDER — IBUPROFEN 200 MG
600 TABLET ORAL ONCE
Refills: 0 | Status: COMPLETED | OUTPATIENT
Start: 2020-11-29 | End: 2020-11-29

## 2020-11-29 RX ORDER — IBUPROFEN 200 MG
1 TABLET ORAL
Qty: 28 | Refills: 0
Start: 2020-11-29 | End: 2020-12-05

## 2020-11-29 RX ADMIN — Medication 600 MILLIGRAM(S): at 16:08

## 2020-11-29 RX ADMIN — Medication 1 TABLET(S): at 16:54

## 2020-11-29 NOTE — ED PROCEDURE NOTE - CPROC ED LOCAL ANESTHESIA1
"CC: Here for routine prenatal visit @ 38w4d   HPI: + FM, no ctx, no LOF, no VB.  Baby already vertex this morning!       PE: /68 (BP Location: Right arm, Patient Position: Chair, Cuff Size: Adult Regular)   Pulse 84   Temp 97.1  F (36.2  C) (Tympanic)   Resp 18   Ht 1.734 m (5' 8.25\")   Wt 86.1 kg (189 lb 12.8 oz)   LMP 10/30/2018   Breastfeeding? No   BMI 28.65 kg/m     See OB flowsheet    A/P  @ 38w4d normal pregnancy    1. Routine prenatal care.  Extensive discussion this morning about timing of delivery and route of delivery.  Given patient's diagnosis of cholestasis and her gestational age, we discussed the plan for delivery today.  Patient and spouse amenable to this plan.    We reviewed the pros and cons of  versus repeat .  Patient and spouse have decided to proceed with repeat  at this point.  [facility] Place informed and we will begin preparations based on her NPO status.     RTC 6 week    Riya Young M.D.    "
with EPI/3/1% lidocaine
with EPI/1% lidocaine

## 2020-11-29 NOTE — ED PROVIDER NOTE - OBJECTIVE STATEMENT
29 yo F with no significant PMH presents to ER c/o painful lesion in midsternal chest area x7 days. Pt states having burning pain, 7/10, intermittent, worse with palpation, taking Tylenol with temporary relief. Reports she noted a small lesion, increased in size for the past 3 months and the pain is getting worse. Denies any fever, chills, n/v/d, dizziness, sob, back pain, abdominal pain, weakness, numbness or any other complaints.

## 2020-11-29 NOTE — ED PROVIDER NOTE - CPE EDP RESP NORM
Asked per Dr Salmeron to speak w/patient about starting Adempas. Adempas booklet for pts w/PH from [facility] provided to patient, along with Medication guide containing REMs info. Pt is of non-childbearing potential. The following were discussed:        1. The Adempas will be supplied to you by a specialty pharmacy. I will send the referral for your medication to the  of the medication ([facility]) and they will contact you and eventually send the medication referral to the pharmacy (I will request [facility], since you use them already). They will tell you about the copay for the med, if there is one at all. If it is too high for you, please let them know this. They can triage you to patient assistance programs, but they can't do so unless you say something. Because the medication also has specific guidelines regarding women and reproduction, they will  you on this as well, even if you are not of childbearing potential (they must  all women). They will arrange to send you the application for your signature, and return to them.     2. After the medication is shipped to you, please do NOT start it right away. Please wait to hear from a specialty pharmacy nurse, who will set up an appointment with you to come to your home to do teaching and assess you for readiness to start the medication (blood pressure, etc). Every few weeks, the nurse will make a visit with you to see how you are doing, and to determine if we can increase the dose. The tablet strengths are 0.5mg, 1.0 mg, 1.5 mg, 2.0 mg and 2.5 mg. Once you've reached the goal dose, you won't likely need additional visits from the nurse.     3. Possible side effects might include low blood pressure, headache, indigestion, dizziness, nosebleeds. Some people don't experience any side effects at all. Please call us with anything that is concerning for you. If you need to take an antacid, please take it one hour before or one hour after the Adempas, as they  can lessen the effect of Adempas.     4. Do not take any medications containing nitrates with Adempas. NTG, imdur/isosorbide.    5. If you are ever hospitalized at a facility other than [facility] while taking Adempas, you will need to bring the medication with you, in case it is not on their formulary. Missing more than 3 days of the medication means having to restart at the lowest dose and titrate up again.     5. The specialty pharmacy will need to make contact with you monthly to arrange shipment of the medication.    - - -

## 2020-11-29 NOTE — ED ADULT NURSE NOTE - OBJECTIVE STATEMENT
Pt  received Aox4,ambulatory at baseline presents to the  ED with a lesion on the midsternal chest area that started couple of days ago. Pt does not know how  it started. Pt states It is itchy and  painful upon touch. Pt  also stating lesion is growing in size. Pt  denies  any other symptoms. Medicated per MD order.

## 2020-11-29 NOTE — ED PROVIDER NOTE - CLINICAL SUMMARY MEDICAL DECISION MAKING FREE TEXT BOX
29 yo F with no significant PMH presents to ER c/o painful lesion in midsternal chest area x7 days. well appearing female. There is no fever. EKG NSR TWI in lead III, V2-V3 (unchanged from previous EKG), Non-exertional, non pleuritic. Concern for possible abscess. Plan: US to eval fluid collection, pain control w/ Motrin, and reassess.

## 2020-11-29 NOTE — ED PROVIDER NOTE - RESPIRATORY CHEST EXAM
mid sternal with small swelling, erythematous, tender to palpation, no discharge mid sternal with small swelling, erythematous, fluctuant, tender to palpation, no discharge

## 2020-11-29 NOTE — ED ADULT TRIAGE NOTE - CHIEF COMPLAINT QUOTE
pt c/o non radiating, midsternal chest pain, weakness x 1 week. pt denies SOB, fever/cough, n/v. pt denies PMH.

## 2020-11-29 NOTE — ED PROVIDER NOTE - NSFOLLOWUPINSTRUCTIONS_ED_ALL_ED_FT
Follow up with your PMD in 72 hours. See Breast referral for follow up, call to make appointment.  Keep covered and dry. Take Bactrim DS two times a day for 7 days. Take Motrin 600mg every 6hrs with food as needed for pain. Warm soak to affected area, several times a day. Any increased pain, redness, fever, chills or any new concerning symptoms return to the emergency department.

## 2020-11-29 NOTE — ED PROVIDER NOTE - PATIENT PORTAL LINK FT
You can access the FollowMyHealth Patient Portal offered by [facility] by registering at the following website: [email or web address]. By joining [employer]’s FollowMyHealth portal, you will also be able to view your health information using other applications (apps) compatible with our system.

## 2020-11-29 NOTE — ED PROCEDURE NOTE - GENERAL PROCEDURE DETAILS
Site clean and prepped in sterile fashion. local anesthesia injected above midsternal abscess. 18 gauze needle w/ 5 cc syringe inserted into abscess, aspirated 1cc of purulent.

## 2020-11-29 NOTE — ED PROVIDER NOTE - ATTENDING CONTRIBUTION TO CARE
29 yo F no past medical history with sternal chest pain, swelling, redness. x 7 days. reports small bump x 3 months but pain only 1 week. also has painless bump by upper left shoulder. denies fever, chills cp, sob. abd pain. exam as above, also firm, non-tender nodule in left upper chest ?tail of breast. sternal lesion likely cellulitis with abscess. given this and left upper chest lesion will refer to breast surgery out-patient. I an d, abx.

## 2020-11-29 NOTE — ED PROVIDER NOTE - PROGRESS NOTE DETAILS
NEREIDA HORTON: bedside US shows fluid collection. Fine needle aspiration performed, aspirated 1cc of purulent drainage. culture obtained and sent. will give Bactrim for coverage and breast clinic for follow up. The patient was given verbal and written discharge instructions. Specifically, instructions when to return to the ED and when to seek follow-up from their pcp was discussed. Any specialty follow-up was discussed, including how to make an appointment.  Instructions were discussed in simple, plain language and was understood by the patient. The patient understands that should their symptoms worsen or any new symptoms arise, they should return to the ED immediately for further evaluation. All pt's questions were answered. Patient verbalizes understanding. NEREIDA HORTON: bedside US shows fluid collection. Fine needle aspiration performed, aspirated 1cc of purulent drainage. culture obtained and sent. will give Bactrim for coverage and breast clinic for follow up. The patient was given verbal and written discharge instructions. Specifically, instructions when to return to the ED and when to seek follow-up from their pcp was discussed. Any specialty follow-up was discussed, including how to make an appointment.  Instructions were discussed in simple, plain language and was understood by the patient. The patient understands that should their symptoms worsen or any new symptoms arise, they should return to the ED immediately for further evaluation. All pt's questions were answered. Patient verbalizes understanding.  Pt preferred phone translation by .

## 2020-12-01 LAB
CULTURE RESULTS: SIGNIFICANT CHANGE UP
SPECIMEN SOURCE: SIGNIFICANT CHANGE UP

## 2020-12-17 NOTE — ED PROVIDER NOTE - CROS ED GU ALL NEG
Reviewed PTA medication list with patient/caregiver and patient/caregiver denies any additional medications. Patient admits to having a responsible adult care for them at home for at least 24 hours after surgery. Patient encouraged to use gown warming system and informed that using said warming gown to regulate body temperature prior to a procedure has been shown to help reduce the risks of blood clots and infection. Patient's pharmacy of choice verified and documented in PTA medication section. Dual skin assessment & fall risk band verification completed with Leesa Torres RN. - - -

## 2020-12-23 ENCOUNTER — RESULT REVIEW (OUTPATIENT)
Age: 30
End: 2020-12-23

## 2021-01-01 NOTE — ED PROVIDER NOTE - NS ED ATTENDING STATEMENT MOD
I have personally seen and examined this patient.  I have fully participated in the care of this patient. I have reviewed all pertinent clinical information, including history, physical exam, plan and the Resident’s note and agree except as noted. DISPLAY PLAN FREE TEXT

## 2021-02-13 ENCOUNTER — EMERGENCY (EMERGENCY)
Facility: HOSPITAL | Age: 31
LOS: 1 days | Discharge: HOME | End: 2021-02-13
Attending: EMERGENCY MEDICINE | Admitting: EMERGENCY MEDICINE
Payer: MEDICAID

## 2021-02-13 VITALS
HEART RATE: 96 BPM | OXYGEN SATURATION: 100 % | TEMPERATURE: 98 F | RESPIRATION RATE: 18 BRPM | SYSTOLIC BLOOD PRESSURE: 101 MMHG | DIASTOLIC BLOOD PRESSURE: 61 MMHG

## 2021-02-13 VITALS
HEART RATE: 106 BPM | OXYGEN SATURATION: 100 % | TEMPERATURE: 97 F | HEIGHT: 63 IN | DIASTOLIC BLOOD PRESSURE: 59 MMHG | SYSTOLIC BLOOD PRESSURE: 107 MMHG | RESPIRATION RATE: 18 BRPM

## 2021-02-13 LAB
ALBUMIN SERPL ELPH-MCNC: 4.5 G/DL — SIGNIFICANT CHANGE UP (ref 3.3–5)
ALP SERPL-CCNC: 61 U/L — SIGNIFICANT CHANGE UP (ref 40–120)
ALT FLD-CCNC: 20 U/L — SIGNIFICANT CHANGE UP (ref 4–33)
ANION GAP SERPL CALC-SCNC: 13 MMOL/L — SIGNIFICANT CHANGE UP (ref 7–14)
APPEARANCE UR: ABNORMAL
AST SERPL-CCNC: 18 U/L — SIGNIFICANT CHANGE UP (ref 4–32)
BASOPHILS # BLD AUTO: 0.04 K/UL — SIGNIFICANT CHANGE UP (ref 0–0.2)
BASOPHILS NFR BLD AUTO: 0.4 % — SIGNIFICANT CHANGE UP (ref 0–2)
BILIRUB SERPL-MCNC: <0.2 MG/DL — SIGNIFICANT CHANGE UP (ref 0.2–1.2)
BILIRUB UR-MCNC: NEGATIVE — SIGNIFICANT CHANGE UP
BUN SERPL-MCNC: 14 MG/DL — SIGNIFICANT CHANGE UP (ref 7–23)
CALCIUM SERPL-MCNC: 9.7 MG/DL — SIGNIFICANT CHANGE UP (ref 8.4–10.5)
CHLORIDE SERPL-SCNC: 100 MMOL/L — SIGNIFICANT CHANGE UP (ref 98–107)
CO2 SERPL-SCNC: 26 MMOL/L — SIGNIFICANT CHANGE UP (ref 22–31)
COLOR SPEC: YELLOW — SIGNIFICANT CHANGE UP
CREAT SERPL-MCNC: 0.73 MG/DL — SIGNIFICANT CHANGE UP (ref 0.5–1.3)
DIFF PNL FLD: ABNORMAL
EOSINOPHIL # BLD AUTO: 0.17 K/UL — SIGNIFICANT CHANGE UP (ref 0–0.5)
EOSINOPHIL NFR BLD AUTO: 1.8 % — SIGNIFICANT CHANGE UP (ref 0–6)
GLUCOSE SERPL-MCNC: 121 MG/DL — HIGH (ref 70–99)
GLUCOSE UR QL: NEGATIVE — SIGNIFICANT CHANGE UP
HCG SERPL-ACNC: <5 MIU/ML — SIGNIFICANT CHANGE UP
HCT VFR BLD CALC: 38.3 % — SIGNIFICANT CHANGE UP (ref 34.5–45)
HGB BLD-MCNC: 12.9 G/DL — SIGNIFICANT CHANGE UP (ref 11.5–15.5)
IANC: 5.46 K/UL — SIGNIFICANT CHANGE UP (ref 1.5–8.5)
IMM GRANULOCYTES NFR BLD AUTO: 0.6 % — SIGNIFICANT CHANGE UP (ref 0–1.5)
KETONES UR-MCNC: NEGATIVE — SIGNIFICANT CHANGE UP
LEUKOCYTE ESTERASE UR-ACNC: NEGATIVE — SIGNIFICANT CHANGE UP
LYMPHOCYTES # BLD AUTO: 2.93 K/UL — SIGNIFICANT CHANGE UP (ref 1–3.3)
LYMPHOCYTES # BLD AUTO: 30.2 % — SIGNIFICANT CHANGE UP (ref 13–44)
MCHC RBC-ENTMCNC: 29.9 PG — SIGNIFICANT CHANGE UP (ref 27–34)
MCHC RBC-ENTMCNC: 33.7 GM/DL — SIGNIFICANT CHANGE UP (ref 32–36)
MCV RBC AUTO: 88.7 FL — SIGNIFICANT CHANGE UP (ref 80–100)
MONOCYTES # BLD AUTO: 1.04 K/UL — HIGH (ref 0–0.9)
MONOCYTES NFR BLD AUTO: 10.7 % — SIGNIFICANT CHANGE UP (ref 2–14)
NEUTROPHILS # BLD AUTO: 5.46 K/UL — SIGNIFICANT CHANGE UP (ref 1.8–7.4)
NEUTROPHILS NFR BLD AUTO: 56.3 % — SIGNIFICANT CHANGE UP (ref 43–77)
NITRITE UR-MCNC: NEGATIVE — SIGNIFICANT CHANGE UP
NRBC # BLD: 0 /100 WBCS — SIGNIFICANT CHANGE UP
NRBC # FLD: 0 K/UL — SIGNIFICANT CHANGE UP
PH UR: 6 — SIGNIFICANT CHANGE UP (ref 5–8)
PLATELET # BLD AUTO: 228 K/UL — SIGNIFICANT CHANGE UP (ref 150–400)
POTASSIUM SERPL-MCNC: 3.7 MMOL/L — SIGNIFICANT CHANGE UP (ref 3.5–5.3)
POTASSIUM SERPL-SCNC: 3.7 MMOL/L — SIGNIFICANT CHANGE UP (ref 3.5–5.3)
PROT SERPL-MCNC: 7.3 G/DL — SIGNIFICANT CHANGE UP (ref 6–8.3)
PROT UR-MCNC: ABNORMAL
RBC # BLD: 4.32 M/UL — SIGNIFICANT CHANGE UP (ref 3.8–5.2)
RBC # FLD: 12.8 % — SIGNIFICANT CHANGE UP (ref 10.3–14.5)
SODIUM SERPL-SCNC: 139 MMOL/L — SIGNIFICANT CHANGE UP (ref 135–145)
SP GR SPEC: 1.03 — HIGH (ref 1.01–1.02)
UROBILINOGEN FLD QL: SIGNIFICANT CHANGE UP
WBC # BLD: 9.7 K/UL — SIGNIFICANT CHANGE UP (ref 3.8–10.5)
WBC # FLD AUTO: 9.7 K/UL — SIGNIFICANT CHANGE UP (ref 3.8–10.5)

## 2021-02-13 PROCEDURE — 99284 EMERGENCY DEPT VISIT MOD MDM: CPT

## 2021-02-13 PROCEDURE — 76830 TRANSVAGINAL US NON-OB: CPT | Mod: 26

## 2021-02-13 RX ORDER — ACETAMINOPHEN 500 MG
650 TABLET ORAL ONCE
Refills: 0 | Status: COMPLETED | OUTPATIENT
Start: 2021-02-13 | End: 2021-02-13

## 2021-02-13 RX ADMIN — Medication 650 MILLIGRAM(S): at 20:04

## 2021-02-13 NOTE — ED PROVIDER NOTE - OBJECTIVE STATEMENT
30yF w/no stated pmhx presenting with concern for pregnancy and lower abdominal pain. Pt states her LMP was 1/11, she took a home pregnancy test and is uncertain of result. Pt states yesterday she developed pain to the pelvis/lower abdomen, worse on the left than right. Pt reports 2 weeks of yellow foul smelling discharge. Associated pt reports palpitations and lightheadedness. Pt denies fever/chills, weakness, nausea, vomiting, diarrhea, vaginal bleeding, vaginal itching, dysuria, urinary frequency, cp, sob or any other concerns. 30yF w/no stated pmhx presenting with concern for pregnancy and lower abdominal pain. Pt states her LMP was 1/11, she took a home pregnancy test and is uncertain of result. Pt states yesterday she developed pain to the pelvis/lower abdomen, worse on the left than right. Pt reports 2 weeks of yellow/white vaginal discharge. Associated pt reports palpitations and lightheadedness. Pt denies fever/chills, weakness, nausea, vomiting, diarrhea, vaginal bleeding, vaginal itching, dysuria, urinary frequency, cp, sob or any other concerns.

## 2021-02-13 NOTE — ED PROVIDER NOTE - ATTENDING CONTRIBUTION TO CARE
DR. MALDONADO, ATTENDING MD-  I performed a face to face bedside interview with the patient regarding history of present illness, review of symptoms and past medical history. I completed an independent physical exam.  I have discussed the patient's plan of care with the PA.   Documentation as above in the note.    MDM written by myself.

## 2021-02-13 NOTE — ED PROVIDER NOTE - NSFOLLOWUPINSTRUCTIONS_ED_ALL_ED_FT
Follow up with your primary care doctor and your GYN patient within 1 week, show copies of your results  Take Tylenol 650mg every 6 hours as needed for pain  Return to the ER with any worsening or concerning symptoms, increased pain, nausea, vomiting, fever or any other concerns.

## 2021-02-13 NOTE — ED PROVIDER NOTE - PHYSICAL EXAMINATION
: no CMT, clear/white vaginal discharge, +left sided adnexal tenderness : no CMT, clear/white vaginal discharge, +left sided adnexal tenderness  Mellissa Barone

## 2021-02-13 NOTE — ED ADULT NURSE NOTE - OBJECTIVE STATEMENT
Pt presents to ED ambulatory from home with c/o lower abdominal pain x2 days and concern for pregnancy last menstrual cycle 1/11. Pt states she took 2 at home pregnancy tests and is uncertain of result. Pt denies N/V/D. #20g IV placed to LAC, lab work collected as ordered.

## 2021-02-13 NOTE — ED PROVIDER NOTE - PATIENT PORTAL LINK FT
You can access the FollowMyHealth Patient Portal offered by [facility] by registering at the following website: [email or web address]. By joining [employer]’s FollowMyHealth portal, you will also be able to view your health information using other applications (apps) compatible with our system.

## 2021-02-13 NOTE — ED ADULT TRIAGE NOTE - CHIEF COMPLAINT QUOTE
Complaining of lower abdominal pain since yesterday. Denies nausea, vomiting, fevers, chills, chest pain, sob, dysuria.

## 2021-02-13 NOTE — ED ADULT NURSE NOTE - NSIMPLEMENTINTERV_GEN_ALL_ED
Implemented All Universal Safety Interventions:  [place] to call system. Call bell, personal items and telephone within reach. Instruct patient to call for assistance. Room bathroom lighting operational. Non-slip footwear when patient is off stretcher. Physically safe environment: no spills, clutter or unnecessary equipment. Stretcher in lowest position, wheels locked, appropriate side rails in place.

## 2021-02-13 NOTE — ED PROVIDER NOTE - PROGRESS NOTE DETAILS
NEREIDA Coffman: Labs reviewed, TVUS with right ovarian cyst. Will d/c home with GYN follow up NEREIDA Coffman: Labs reviewed, TVUS with right ovarian cyst. Will d/c home with GYN follow up. Pt agrees with this discharge plan

## 2021-02-14 LAB
CULTURE RESULTS: SIGNIFICANT CHANGE UP
SPECIMEN SOURCE: SIGNIFICANT CHANGE UP

## 2021-02-15 LAB
C TRACH RRNA SPEC QL NAA+PROBE: SIGNIFICANT CHANGE UP
N GONORRHOEA RRNA SPEC QL NAA+PROBE: SIGNIFICANT CHANGE UP
SPECIMEN SOURCE: SIGNIFICANT CHANGE UP

## 2021-03-30 ENCOUNTER — OUTPATIENT (OUTPATIENT)
Dept: OUTPATIENT SERVICES | Facility: HOSPITAL | Age: 31
LOS: 1 days | End: 2021-03-30

## 2021-03-30 ENCOUNTER — RESULT REVIEW (OUTPATIENT)
Age: 31
End: 2021-03-30

## 2021-03-30 ENCOUNTER — APPOINTMENT (OUTPATIENT)
Dept: OBGYN | Facility: HOSPITAL | Age: 31
End: 2021-03-30
Payer: MEDICAID

## 2021-03-30 VITALS
HEART RATE: 85 BPM | SYSTOLIC BLOOD PRESSURE: 122 MMHG | WEIGHT: 167 LBS | TEMPERATURE: 98.1 F | DIASTOLIC BLOOD PRESSURE: 76 MMHG | HEIGHT: 63 IN | BODY MASS INDEX: 29.59 KG/M2

## 2021-03-30 DIAGNOSIS — N97.9 FEMALE INFERTILITY, UNSPECIFIED: ICD-10-CM

## 2021-03-30 DIAGNOSIS — B37.9 CANDIDIASIS, UNSPECIFIED: ICD-10-CM

## 2021-03-30 PROCEDURE — 99213 OFFICE O/P EST LOW 20 MIN: CPT | Mod: GE

## 2021-03-30 RX ORDER — FLUCONAZOLE 150 MG/1
150 TABLET ORAL
Qty: 1 | Refills: 3 | Status: ACTIVE | COMMUNITY
Start: 2021-03-30 | End: 1900-01-01

## 2021-03-30 RX ORDER — DOXYCYCLINE HYCLATE 100 MG/1
100 CAPSULE ORAL
Qty: 5 | Refills: 0 | Status: DISCONTINUED | COMMUNITY
Start: 2020-01-23 | End: 2021-03-30

## 2021-03-30 RX ORDER — DOXYLAMINE SUCCINATE 25 MG
25 TABLET ORAL
Qty: 20 | Refills: 1 | Status: DISCONTINUED | COMMUNITY
Start: 2020-04-21 | End: 2021-03-30

## 2021-03-31 NOTE — PHYSICAL EXAM
[No Lymphadenopathy] : no lymphadenopathy [Regular Rate Rhythm] : regular rate rhythm [Clear to Auscultation B/L] : clear to auscultation bilaterally [Soft] : soft [Non-tender] : non-tender [Non-distended] : non-distended [No Mass] : no mass [Oriented x3] : oriented x3 [Examination Of The Breasts] : a normal appearance [No Discharge] : no discharge [No Masses] : no breast masses were palpable [Labia Majora] : normal [Labia Minora] : normal [Discharge] : a  ~M vaginal discharge was present [Pink Rugae] : pink rugae [Moderate] : moderate [White] : white [Cheesy] : cheesy [No Bleeding] : There was no active vaginal bleeding [Normal] : normal [Uterine Adnexae] : normal [Tenderness] : nontender [Enlarged ___ wks] : not enlarged [Mass ___ cm] : no uterine mass was palpated

## 2021-03-31 NOTE — REVIEW OF SYSTEMS
[Fever] : no fever [Chills] : no chills [Dyspnea] : no dyspnea [Cough] : no cough [Chest Pain] : no chest pain [Palpitations] : no palpitations [Abdominal Pain] : no abdominal pain [Diarrhea] : diarrhea [Vomiting] : no vomiting [Urgency] : no urgency [Frequency] : no frequency [Dysuria] : no dysuria [Pelvic pain] : no pelvic pain [Breast Pain] : no breast pain [Breast Lump] : no breast lump [Nipple Changes] : no nipple changes [Headache] : no headache [Dizziness] : no dizziness

## 2021-04-01 LAB
CANDIDA AB TITR SER: DETECTED
G VAGINALIS DNA SPEC QL NAA+PROBE: SIGNIFICANT CHANGE UP
T VAGINALIS SPEC QL WET PREP: SIGNIFICANT CHANGE UP

## 2021-04-06 DIAGNOSIS — B37.9 CANDIDIASIS, UNSPECIFIED: ICD-10-CM

## 2021-04-06 DIAGNOSIS — Z01.419 ENCOUNTER FOR GYNECOLOGICAL EXAMINATION (GENERAL) (ROUTINE) WITHOUT ABNORMAL FINDINGS: ICD-10-CM

## 2021-04-06 DIAGNOSIS — N97.9 FEMALE INFERTILITY, UNSPECIFIED: ICD-10-CM

## 2021-04-12 ENCOUNTER — APPOINTMENT (OUTPATIENT)
Dept: OBGYN | Facility: HOSPITAL | Age: 31
End: 2021-04-12
Payer: MEDICAID

## 2021-04-12 ENCOUNTER — OUTPATIENT (OUTPATIENT)
Dept: OUTPATIENT SERVICES | Facility: HOSPITAL | Age: 31
LOS: 1 days | End: 2021-04-12

## 2021-04-12 DIAGNOSIS — Z32.00 ENCOUNTER FOR PREGNANCY TEST, RESULT UNKNOWN: ICD-10-CM

## 2021-04-12 DIAGNOSIS — Z34.80 ENCOUNTER FOR SUPERVISION OF OTHER NORMAL PREGNANCY, UNSPECIFIED TRIMESTER: ICD-10-CM

## 2021-04-12 DIAGNOSIS — Z87.898 PERSONAL HISTORY OF OTHER SPECIFIED CONDITIONS: ICD-10-CM

## 2021-04-12 PROCEDURE — 99213 OFFICE O/P EST LOW 20 MIN: CPT | Mod: GC,25

## 2021-04-12 RX ORDER — PNV 119/IRON FUM/FOLIC ACID 29 MG-1 MG
TABLET ORAL DAILY
Qty: 360 | Refills: 0 | Status: ACTIVE | COMMUNITY
Start: 2021-04-12 | End: 1900-01-01

## 2021-04-13 DIAGNOSIS — Z31.69 ENCOUNTER FOR OTHER GENERAL COUNSELING AND ADVICE ON PROCREATION: ICD-10-CM

## 2021-04-13 PROBLEM — Z87.898 HISTORY OF DYSURIA: Status: RESOLVED | Noted: 2020-01-23 | Resolved: 2021-04-13

## 2021-04-13 PROBLEM — Z34.80 SUPERVISION OF OTHER NORMAL PREGNANCY, ANTEPARTUM: Status: RESOLVED | Noted: 2020-04-21 | Resolved: 2021-04-13

## 2021-04-13 PROBLEM — Z32.00 ENCOUNTER FOR PREGNANCY TEST: Status: RESOLVED | Noted: 2020-03-16 | Resolved: 2021-04-13

## 2021-04-13 PROBLEM — Z87.898 HISTORY OF VOMITING: Status: RESOLVED | Noted: 2020-04-21 | Resolved: 2021-04-13

## 2021-05-04 RX ORDER — PRENATAL VIT NO.126/IRON/FOLIC 28MG-0.8MG
28-0.8 TABLET ORAL DAILY
Qty: 30 | Refills: 3 | Status: ACTIVE | COMMUNITY
Start: 2021-05-04 | End: 1900-01-01

## 2021-06-01 NOTE — ED ADULT NURSE NOTE - ALCOHOL PRE SCREEN (AUDIT - C)
Statement Selected Ftsg Text: The defect edges were debeveled with a #15c scalpel blade.  Given the location of the defect, shape of the defect and the proximity to free margins a full thickness skin graft was deemed most appropriate.  Using a sterile surgical marker, the primary defect shape was transferred to the donor site. The area thus outlined was incised deep to adipose tissue with a #15c scalpel blade.  The harvested graft was then trimmed of adipose tissue until only dermis and epidermis was left.  The skin margins of the secondary defect were undermined to an appropriate distance in all directions utilizing iris scissors.  The secondary defect was closed with interrupted buried subcutaneous sutures.  The skin edges were then re-apposed with running  sutures.  The skin graft was then placed in the primary defect and oriented appropriately.

## 2021-10-24 ENCOUNTER — EMERGENCY (EMERGENCY)
Facility: HOSPITAL | Age: 31
LOS: 1 days | Discharge: HOME | End: 2021-10-24
Attending: EMERGENCY MEDICINE | Admitting: EMERGENCY MEDICINE
Payer: MEDICAID

## 2021-10-24 VITALS
DIASTOLIC BLOOD PRESSURE: 69 MMHG | HEART RATE: 97 BPM | OXYGEN SATURATION: 100 % | SYSTOLIC BLOOD PRESSURE: 113 MMHG | TEMPERATURE: 98 F | RESPIRATION RATE: 17 BRPM

## 2021-10-24 VITALS
OXYGEN SATURATION: 100 % | RESPIRATION RATE: 18 BRPM | HEART RATE: 83 BPM | HEIGHT: 63 IN | SYSTOLIC BLOOD PRESSURE: 102 MMHG | TEMPERATURE: 98 F | DIASTOLIC BLOOD PRESSURE: 70 MMHG

## 2021-10-24 LAB
ALBUMIN SERPL ELPH-MCNC: 4.7 G/DL — SIGNIFICANT CHANGE UP (ref 3.3–5)
ALP SERPL-CCNC: 72 U/L — SIGNIFICANT CHANGE UP (ref 40–120)
ALT FLD-CCNC: 20 U/L — SIGNIFICANT CHANGE UP (ref 4–33)
ANION GAP SERPL CALC-SCNC: 11 MMOL/L — SIGNIFICANT CHANGE UP (ref 7–14)
APPEARANCE UR: ABNORMAL
AST SERPL-CCNC: 23 U/L — SIGNIFICANT CHANGE UP (ref 4–32)
B PERT DNA SPEC QL NAA+PROBE: SIGNIFICANT CHANGE UP
B PERT+PARAPERT DNA PNL SPEC NAA+PROBE: SIGNIFICANT CHANGE UP
BACTERIA # UR AUTO: NEGATIVE — SIGNIFICANT CHANGE UP
BASOPHILS # BLD AUTO: 0.04 K/UL — SIGNIFICANT CHANGE UP (ref 0–0.2)
BASOPHILS NFR BLD AUTO: 0.4 % — SIGNIFICANT CHANGE UP (ref 0–2)
BILIRUB SERPL-MCNC: 0.3 MG/DL — SIGNIFICANT CHANGE UP (ref 0.2–1.2)
BILIRUB UR-MCNC: NEGATIVE — SIGNIFICANT CHANGE UP
BORDETELLA PARAPERTUSSIS (RAPRVP): SIGNIFICANT CHANGE UP
BUN SERPL-MCNC: 12 MG/DL — SIGNIFICANT CHANGE UP (ref 7–23)
C PNEUM DNA SPEC QL NAA+PROBE: SIGNIFICANT CHANGE UP
CALCIUM SERPL-MCNC: 9.5 MG/DL — SIGNIFICANT CHANGE UP (ref 8.4–10.5)
CHLORIDE SERPL-SCNC: 101 MMOL/L — SIGNIFICANT CHANGE UP (ref 98–107)
CO2 SERPL-SCNC: 25 MMOL/L — SIGNIFICANT CHANGE UP (ref 22–31)
COLOR SPEC: COLORLESS — SIGNIFICANT CHANGE UP
CREAT SERPL-MCNC: 0.79 MG/DL — SIGNIFICANT CHANGE UP (ref 0.5–1.3)
DIFF PNL FLD: ABNORMAL
EOSINOPHIL # BLD AUTO: 0.14 K/UL — SIGNIFICANT CHANGE UP (ref 0–0.5)
EOSINOPHIL NFR BLD AUTO: 1.3 % — SIGNIFICANT CHANGE UP (ref 0–6)
EPI CELLS # UR: 6 /HPF — HIGH (ref 0–5)
FLUAV SUBTYP SPEC NAA+PROBE: SIGNIFICANT CHANGE UP
FLUBV RNA SPEC QL NAA+PROBE: SIGNIFICANT CHANGE UP
GLUCOSE SERPL-MCNC: 104 MG/DL — HIGH (ref 70–99)
GLUCOSE UR QL: NEGATIVE — SIGNIFICANT CHANGE UP
HADV DNA SPEC QL NAA+PROBE: SIGNIFICANT CHANGE UP
HCG SERPL-ACNC: <5 MIU/ML — SIGNIFICANT CHANGE UP
HCOV 229E RNA SPEC QL NAA+PROBE: SIGNIFICANT CHANGE UP
HCOV HKU1 RNA SPEC QL NAA+PROBE: SIGNIFICANT CHANGE UP
HCOV NL63 RNA SPEC QL NAA+PROBE: SIGNIFICANT CHANGE UP
HCOV OC43 RNA SPEC QL NAA+PROBE: SIGNIFICANT CHANGE UP
HCT VFR BLD CALC: 37.1 % — SIGNIFICANT CHANGE UP (ref 34.5–45)
HGB BLD-MCNC: 12.2 G/DL — SIGNIFICANT CHANGE UP (ref 11.5–15.5)
HMPV RNA SPEC QL NAA+PROBE: SIGNIFICANT CHANGE UP
HPIV1 RNA SPEC QL NAA+PROBE: SIGNIFICANT CHANGE UP
HPIV2 RNA SPEC QL NAA+PROBE: SIGNIFICANT CHANGE UP
HPIV3 RNA SPEC QL NAA+PROBE: SIGNIFICANT CHANGE UP
HPIV4 RNA SPEC QL NAA+PROBE: SIGNIFICANT CHANGE UP
HYALINE CASTS # UR AUTO: 1 /LPF — SIGNIFICANT CHANGE UP (ref 0–7)
IANC: 6.57 K/UL — SIGNIFICANT CHANGE UP (ref 1.5–8.5)
IMM GRANULOCYTES NFR BLD AUTO: 0.4 % — SIGNIFICANT CHANGE UP (ref 0–1.5)
KETONES UR-MCNC: NEGATIVE — SIGNIFICANT CHANGE UP
LEUKOCYTE ESTERASE UR-ACNC: ABNORMAL
LYMPHOCYTES # BLD AUTO: 3.28 K/UL — SIGNIFICANT CHANGE UP (ref 1–3.3)
LYMPHOCYTES # BLD AUTO: 30.3 % — SIGNIFICANT CHANGE UP (ref 13–44)
M PNEUMO DNA SPEC QL NAA+PROBE: SIGNIFICANT CHANGE UP
MCHC RBC-ENTMCNC: 29.6 PG — SIGNIFICANT CHANGE UP (ref 27–34)
MCHC RBC-ENTMCNC: 32.9 GM/DL — SIGNIFICANT CHANGE UP (ref 32–36)
MCV RBC AUTO: 90 FL — SIGNIFICANT CHANGE UP (ref 80–100)
MONOCYTES # BLD AUTO: 0.76 K/UL — SIGNIFICANT CHANGE UP (ref 0–0.9)
MONOCYTES NFR BLD AUTO: 7 % — SIGNIFICANT CHANGE UP (ref 2–14)
NEUTROPHILS # BLD AUTO: 6.57 K/UL — SIGNIFICANT CHANGE UP (ref 1.8–7.4)
NEUTROPHILS NFR BLD AUTO: 60.6 % — SIGNIFICANT CHANGE UP (ref 43–77)
NITRITE UR-MCNC: NEGATIVE — SIGNIFICANT CHANGE UP
NRBC # BLD: 0 /100 WBCS — SIGNIFICANT CHANGE UP
NRBC # FLD: 0 K/UL — SIGNIFICANT CHANGE UP
PH UR: 6 — SIGNIFICANT CHANGE UP (ref 5–8)
PLATELET # BLD AUTO: 300 K/UL — SIGNIFICANT CHANGE UP (ref 150–400)
POTASSIUM SERPL-MCNC: 3.8 MMOL/L — SIGNIFICANT CHANGE UP (ref 3.5–5.3)
POTASSIUM SERPL-SCNC: 3.8 MMOL/L — SIGNIFICANT CHANGE UP (ref 3.5–5.3)
PROT SERPL-MCNC: 7.9 G/DL — SIGNIFICANT CHANGE UP (ref 6–8.3)
PROT UR-MCNC: NEGATIVE — SIGNIFICANT CHANGE UP
RAPID RVP RESULT: SIGNIFICANT CHANGE UP
RBC # BLD: 4.12 M/UL — SIGNIFICANT CHANGE UP (ref 3.8–5.2)
RBC # FLD: 13.2 % — SIGNIFICANT CHANGE UP (ref 10.3–14.5)
RBC CASTS # UR COMP ASSIST: 2 /HPF — SIGNIFICANT CHANGE UP (ref 0–4)
RSV RNA SPEC QL NAA+PROBE: SIGNIFICANT CHANGE UP
RV+EV RNA SPEC QL NAA+PROBE: SIGNIFICANT CHANGE UP
SARS-COV-2 RNA SPEC QL NAA+PROBE: SIGNIFICANT CHANGE UP
SODIUM SERPL-SCNC: 137 MMOL/L — SIGNIFICANT CHANGE UP (ref 135–145)
SP GR SPEC: 1.01 — SIGNIFICANT CHANGE UP (ref 1–1.05)
UROBILINOGEN FLD QL: SIGNIFICANT CHANGE UP
WBC # BLD: 10.83 K/UL — HIGH (ref 3.8–10.5)
WBC # FLD AUTO: 10.83 K/UL — HIGH (ref 3.8–10.5)
WBC UR QL: 27 /HPF — HIGH (ref 0–5)

## 2021-10-24 PROCEDURE — 76830 TRANSVAGINAL US NON-OB: CPT | Mod: 26

## 2021-10-24 PROCEDURE — 71045 X-RAY EXAM CHEST 1 VIEW: CPT | Mod: 26

## 2021-10-24 PROCEDURE — 99285 EMERGENCY DEPT VISIT HI MDM: CPT

## 2021-10-24 RX ORDER — CEPHALEXIN 500 MG
1 CAPSULE ORAL
Qty: 10 | Refills: 0
Start: 2021-10-24 | End: 2021-10-28

## 2021-10-24 RX ORDER — IPRATROPIUM/ALBUTEROL SULFATE 18-103MCG
3 AEROSOL WITH ADAPTER (GRAM) INHALATION
Refills: 0 | Status: COMPLETED | OUTPATIENT
Start: 2021-10-24 | End: 2021-10-24

## 2021-10-24 RX ORDER — CEFTRIAXONE 500 MG/1
1000 INJECTION, POWDER, FOR SOLUTION INTRAMUSCULAR; INTRAVENOUS ONCE
Refills: 0 | Status: COMPLETED | OUTPATIENT
Start: 2021-10-24 | End: 2021-10-24

## 2021-10-24 RX ADMIN — CEFTRIAXONE 100 MILLIGRAM(S): 500 INJECTION, POWDER, FOR SOLUTION INTRAMUSCULAR; INTRAVENOUS at 17:31

## 2021-10-24 RX ADMIN — Medication 3 MILLILITER(S): at 17:31

## 2021-10-24 RX ADMIN — CEFTRIAXONE 1000 MILLIGRAM(S): 500 INJECTION, POWDER, FOR SOLUTION INTRAMUSCULAR; INTRAVENOUS at 18:08

## 2021-10-24 RX ADMIN — Medication 50 MILLIGRAM(S): at 17:32

## 2021-10-24 RX ADMIN — Medication 3 MILLILITER(S): at 17:04

## 2021-10-24 RX ADMIN — Medication 3 MILLILITER(S): at 18:06

## 2021-10-24 NOTE — ED PROVIDER NOTE - NSFOLLOWUPINSTRUCTIONS_ED_ALL_ED_FT
You came to the ER with difficulty breathing and abdominal pain. We evaluated you with an ultrasound, xray, bloodwork, and a urine test. Your results are attached.     Your difficulty breathing is likely due to your asthma. Please take the prednisone sent to your pharmacy as prescribed and use your inhaler as needed    Your abdominal pain may be due to a urinary tract infection. Please take the Cephalexin sent to your pharmacy as prescribed.    You can take TYLENOL/ACETAMINOPHEN up to 4,000mg a day for your symptoms in four divided doses.     You can take MOTRIN/IBUPROFEN up to 2,400mg a day in four divided doses.    Return to the ER for new fevers, worsening pain, new vaginal bleeding not explained by your menstrual cycle, worsening difficulty breathing, passing out, or for any concern you would like evaluated. You came to the ER with difficulty breathing and abdominal pain. We evaluated you with an ultrasound, xray, bloodwork, and a urine test. Your results are attached.     Your difficulty breathing is likely due to your asthma. Please take the prednisone sent to your pharmacy as prescribed and use your inhaler as needed    Your abdominal pain may be due to a urinary tract infection. Please take the Cephalexin sent to your pharmacy as prescribed.    You also have an ovarian cyst that needs to be followed. Please see your GYN.     You can take TYLENOL/ACETAMINOPHEN up to 4,000mg a day for your symptoms in four divided doses.     You can take MOTRIN/IBUPROFEN up to 2,400mg a day in four divided doses.    Return to the ER for new fevers, worsening pain, new vaginal bleeding not explained by your menstrual cycle, worsening difficulty breathing, passing out, or for any concern you would like evaluated.    Follow up with your doctor in 1-2 weeks.     -If you do not have a PMD, please call [phone] to find one convenient for you or call our clinic at [phone].

## 2021-10-24 NOTE — ED PROVIDER NOTE - PROGRESS NOTE DETAILS
Kenny: pt getting nebs now, in setting of abd pain will treat urine as if infected and give abx. will re eval likely tbdc. Kenny: pt feels better, given results, told to f/u gyn and pcp. Results explained, printed and given to patient, patient given discharge instructions and information for follow up and return precautions.

## 2021-10-24 NOTE — ED PROVIDER NOTE - OBJECTIVE STATEMENT
31 y.o F hx HLD, asthma, gerd, here with SOB x3 days and abd pain x1 day. SOB feels similar to previous asthma, mild, refractory to home albuterol. Minimal cough, no fevers, vaccinated for covid, no sick contacts. No cp or leg swelling, not on OCPs. Abd pain bilateral lower, L >R, not a/w diarrhea, n/v, vaginal bleeding, urinary sx. Does have vaginal discharge from a yeast infection which she states is chronic and being treated outpatient. Is due for LMP but has not had this month. Is sexually active and requesting pregnancy test.

## 2021-10-24 NOTE — ED PROVIDER NOTE - PATIENT PORTAL LINK FT
You can access the FollowMyHealth Patient Portal offered by [facility] by registering at the following website: [email or web address]. By joining [employer]’s FollowMyHealth portal, you will also be able to view your health information using other applications (apps) compatible with our system.

## 2021-10-24 NOTE — ED PROVIDER NOTE - PHYSICAL EXAMINATION
Vitals: I have reviewed the patients vital signs. nontachy not hypoxic  General: Well dressed, well appearing, no acute distress  HEENT: Atraumatic, normocephalic, airway patent  Eyes: EOMI, tracking appropriately  Neck: no tracheal deviation, no JVD  Chest/Lungs: no trauma, symmetric chest rise, speaking in complete sentences, no WOB, mild wheezes, decreased bibasilar bs  Heart: skin and extremities well perfused, regular rate and rhythm, no peripheral edema  Neuro: A+Ox3, ambulating without difficulty, CN grossly intact  MSK: strength at baseline in all extremities, no muscle wasting or atrophy, no calf tenderness  Skin: no cyanosis, no jaundice, no new emergent lesions  Abd: Soft minimal LLQ tenderness no RLQ tenderness no peritoneal signs.   : chaperone by YOUSIF Zhou, white discharge c/w previous yeast infection, OS closed, no bleeding, no vesicles, no CMT, L adnexal tenderness.

## 2021-10-24 NOTE — ED ADULT NURSE NOTE - OBJECTIVE STATEMENT
A&Ox4. ambulatory. c/o LLQ abd pain and sob. NAD. Respirations even and unlabored. Lung sounds clear with equal chest rise bilaterally. ABD is soft, non tender, non distended with normal active bowel sounds. skin intact. pt states BMS and urine are normal in color and frequency. 20g iv placed to left ac. labs drawn and sent. medication given as ordered. safety precautions maintained.

## 2021-10-24 NOTE — ED PROVIDER NOTE - NSICDXPASTMEDICALHX_GEN_ALL_CORE_FT
PAST MEDICAL HISTORY:  UTI (urinary tract infection) h/o    Vaginal delivery 2006 f    Yeast infection of the vagina h/o

## 2021-10-24 NOTE — ED PROVIDER NOTE - ATTENDING CONTRIBUTION TO CARE
Pt w/ h/o asthma presents with sob and abd pain. On exam, mild wheezing no resp distress, non toxic appearing, suspect asthma exacerbation. Abd s, mild ttp to lower abd L>R with some L adnexal ttp. No cmt, no c/f PID or torsion given low level of pain, comfortable appearing. Plan for TVUS to eval for possible ovarian cyst, also pending pregnancy test.

## 2021-10-24 NOTE — ED ADULT TRIAGE NOTE - CHIEF COMPLAINT QUOTE
c/o LLQ abd pain and SOB for the past 3 days, reports hx of asthma, lung sounds clear B/l, pt reports vaccinated for COVID, denies any other symptoms or hx.

## 2021-10-24 NOTE — ED PROVIDER NOTE - NS ED ROS FT
Constitutional: (-) fever (-) vomiting  Eyes/ENT: (-) vision changes, (-) hearing changes  Cardiovascular: (-) chest pain, (+) wheezing  Respiratory: (-) cough, (+) shortness of breath  Gastrointestinal: (-) vomiting, (-) diarrhea, (+) abdominal pain  : (-) dysuria +discharge  Musculoskeletal: (-) back pain  Integumentary: (-) rash, (-) edema  Neurological: (-)loc  Allergic/Immunologic: (-) pruritus  Endocrine: No history of thyroid disease

## 2021-10-24 NOTE — ED PROVIDER NOTE - CLINICAL SUMMARY MEDICAL DECISION MAKING FREE TEXT BOX
30 y/o F hx asthma and gerd, here with SOB and abd pain. SOB x3 days, c/w previous asthma, no fevers or sick contacts, refractory to home albuterol. Well appearing no wob, slight wheeze, no tachypnea, no leg swelling, no f/c. Not on AC. Abd pain x1 day, LLQ > RLQ, tender LLQ, no rebound or guarding, L adnexal tenderness, vaginal discharge c/w known yeast infection. SOB likely 2/2 asthma, could be viral, doubt PTX, no suspicion of PE without RF tachycardia or hypoxia. Will get CXR, give nebs and steroids, reassess. For abd pain, adnexal tenderness, will check upreg, ultrasound, basic labs.

## 2021-10-26 LAB
CULTURE RESULTS: SIGNIFICANT CHANGE UP
SPECIMEN SOURCE: SIGNIFICANT CHANGE UP

## 2022-06-20 ENCOUNTER — EMERGENCY (EMERGENCY)
Facility: HOSPITAL | Age: 32
LOS: 1 days | Discharge: HOME | End: 2022-06-20
Admitting: EMERGENCY MEDICINE
Payer: MEDICAID

## 2022-06-20 VITALS
OXYGEN SATURATION: 100 % | SYSTOLIC BLOOD PRESSURE: 104 MMHG | TEMPERATURE: 98 F | RESPIRATION RATE: 16 BRPM | HEART RATE: 70 BPM | DIASTOLIC BLOOD PRESSURE: 63 MMHG

## 2022-06-20 VITALS
SYSTOLIC BLOOD PRESSURE: 110 MMHG | TEMPERATURE: 99 F | RESPIRATION RATE: 15 BRPM | HEIGHT: 63 IN | DIASTOLIC BLOOD PRESSURE: 68 MMHG | OXYGEN SATURATION: 100 % | HEART RATE: 80 BPM

## 2022-06-20 LAB
ALBUMIN SERPL ELPH-MCNC: 4.4 G/DL — SIGNIFICANT CHANGE UP (ref 3.3–5)
ALP SERPL-CCNC: 79 U/L — SIGNIFICANT CHANGE UP (ref 40–120)
ALT FLD-CCNC: 23 U/L — SIGNIFICANT CHANGE UP (ref 4–33)
ANION GAP SERPL CALC-SCNC: 11 MMOL/L — SIGNIFICANT CHANGE UP (ref 7–14)
AST SERPL-CCNC: 20 U/L — SIGNIFICANT CHANGE UP (ref 4–32)
BASOPHILS # BLD AUTO: 0.05 K/UL — SIGNIFICANT CHANGE UP (ref 0–0.2)
BASOPHILS NFR BLD AUTO: 0.4 % — SIGNIFICANT CHANGE UP (ref 0–2)
BILIRUB SERPL-MCNC: <0.2 MG/DL — SIGNIFICANT CHANGE UP (ref 0.2–1.2)
BUN SERPL-MCNC: 14 MG/DL — SIGNIFICANT CHANGE UP (ref 7–23)
CALCIUM SERPL-MCNC: 9.5 MG/DL — SIGNIFICANT CHANGE UP (ref 8.4–10.5)
CHLORIDE SERPL-SCNC: 103 MMOL/L — SIGNIFICANT CHANGE UP (ref 98–107)
CO2 SERPL-SCNC: 24 MMOL/L — SIGNIFICANT CHANGE UP (ref 22–31)
CREAT SERPL-MCNC: 1.18 MG/DL — SIGNIFICANT CHANGE UP (ref 0.5–1.3)
D DIMER BLD IA.RAPID-MCNC: <150 NG/ML DDU — SIGNIFICANT CHANGE UP
EGFR: 63 ML/MIN/1.73M2 — SIGNIFICANT CHANGE UP
EOSINOPHIL # BLD AUTO: 0.13 K/UL — SIGNIFICANT CHANGE UP (ref 0–0.5)
EOSINOPHIL NFR BLD AUTO: 1 % — SIGNIFICANT CHANGE UP (ref 0–6)
GLUCOSE SERPL-MCNC: 111 MG/DL — HIGH (ref 70–99)
HCG SERPL-ACNC: <5 MIU/ML — SIGNIFICANT CHANGE UP
HCT VFR BLD CALC: 36.3 % — SIGNIFICANT CHANGE UP (ref 34.5–45)
HGB BLD-MCNC: 11.5 G/DL — SIGNIFICANT CHANGE UP (ref 11.5–15.5)
IANC: 6.95 K/UL — SIGNIFICANT CHANGE UP (ref 1.8–7.4)
IMM GRANULOCYTES NFR BLD AUTO: 0.3 % — SIGNIFICANT CHANGE UP (ref 0–1.5)
LYMPHOCYTES # BLD AUTO: 34.4 % — SIGNIFICANT CHANGE UP (ref 13–44)
LYMPHOCYTES # BLD AUTO: 4.33 K/UL — HIGH (ref 1–3.3)
MCHC RBC-ENTMCNC: 27.7 PG — SIGNIFICANT CHANGE UP (ref 27–34)
MCHC RBC-ENTMCNC: 31.7 GM/DL — LOW (ref 32–36)
MCV RBC AUTO: 87.5 FL — SIGNIFICANT CHANGE UP (ref 80–100)
MONOCYTES # BLD AUTO: 1.09 K/UL — HIGH (ref 0–0.9)
MONOCYTES NFR BLD AUTO: 8.7 % — SIGNIFICANT CHANGE UP (ref 2–14)
NEUTROPHILS # BLD AUTO: 6.95 K/UL — SIGNIFICANT CHANGE UP (ref 1.8–7.4)
NEUTROPHILS NFR BLD AUTO: 55.2 % — SIGNIFICANT CHANGE UP (ref 43–77)
NRBC # BLD: 0 /100 WBCS — SIGNIFICANT CHANGE UP
NRBC # FLD: 0 K/UL — SIGNIFICANT CHANGE UP
PLATELET # BLD AUTO: 268 K/UL — SIGNIFICANT CHANGE UP (ref 150–400)
POTASSIUM SERPL-MCNC: 3.7 MMOL/L — SIGNIFICANT CHANGE UP (ref 3.5–5.3)
POTASSIUM SERPL-SCNC: 3.7 MMOL/L — SIGNIFICANT CHANGE UP (ref 3.5–5.3)
PROT SERPL-MCNC: 7 G/DL — SIGNIFICANT CHANGE UP (ref 6–8.3)
RBC # BLD: 4.15 M/UL — SIGNIFICANT CHANGE UP (ref 3.8–5.2)
RBC # FLD: 13.9 % — SIGNIFICANT CHANGE UP (ref 10.3–14.5)
SODIUM SERPL-SCNC: 138 MMOL/L — SIGNIFICANT CHANGE UP (ref 135–145)
TROPONIN T, HIGH SENSITIVITY RESULT: <6 NG/L — SIGNIFICANT CHANGE UP
WBC # BLD: 12.59 K/UL — HIGH (ref 3.8–10.5)
WBC # FLD AUTO: 12.59 K/UL — HIGH (ref 3.8–10.5)

## 2022-06-20 PROCEDURE — 99285 EMERGENCY DEPT VISIT HI MDM: CPT | Mod: 25

## 2022-06-20 PROCEDURE — 71046 X-RAY EXAM CHEST 2 VIEWS: CPT | Mod: 26

## 2022-06-20 PROCEDURE — 93010 ELECTROCARDIOGRAM REPORT: CPT

## 2022-06-20 RX ORDER — ASPIRIN/CALCIUM CARB/MAGNESIUM 324 MG
324 TABLET ORAL ONCE
Refills: 0 | Status: COMPLETED | OUTPATIENT
Start: 2022-06-20 | End: 2022-06-20

## 2022-06-20 RX ADMIN — Medication 324 MILLIGRAM(S): at 21:59

## 2022-06-20 NOTE — ED PROVIDER NOTE - NS ED ROS FT
Constitutional: (-) fever   Head: Normal cephalic, Atraumatic  Cardiovascular: (+) chest pain, (-) wheezing  Respiratory: (-) cough, (+) shortness of breath  Gastrointestinal: (-) vomiting, (-) diarrhea, (-) abdominal pain  : (-) dysuria   Musculoskeletal: (-) back pain  Integumentary: (-) rash, (-) edema  Neurological: (-)loc  Allergic/Immunologic: (-) pruritus

## 2022-06-20 NOTE — ED PROVIDER NOTE - CLINICAL SUMMARY MEDICAL DECISION MAKING FREE TEXT BOX
31 Y/O F w/ PMH of HLD presents to ER w/ chest pain.  ACS evaluation  Dimer r/o PE  Symptom Management

## 2022-06-20 NOTE — ED PROVIDER NOTE - PATIENT PORTAL LINK FT
You can access the FollowMyHealth Patient Portal offered by [facility] by registering at the following website: [email or web address]. By joining [employer]’s FollowMyHealth portal, you will also be able to view your health information using other applications (apps) compatible with our system.

## 2022-06-20 NOTE — ED PROVIDER NOTE - OBJECTIVE STATEMENT
31 Y/O F w/ PMH of HLD presents to ER w/ chest pain. Admits to onset of LT sided chest pain on 6/5 prior to flying from [place]. 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 No FH of CAD/MI. Not a smoker. No hx of malignancy or OCP. Denies fever, nausea/vomiting, dizziness, headache, diaphoresis, palpitations or syncope

## 2022-06-20 NOTE — ED ADULT NURSE NOTE - OBJECTIVE STATEMENT
Patient is a 32-year-old female, A&OX4, ambulatory self care c/o X two weeks of chest pain. Phx of hyperthyroidism. 20 G placed in L AC. Labs drawn and sent. Respirations even and unlabored, chest rise equal b/l. Medicated per MD orders, see MAR. Safety maintained. Will continue to monitor.

## 2022-06-21 NOTE — ED ADULT TRIAGE NOTE - BRAND OF FIRST COVID-19 BOOSTER
Alejandra Valtrex Pregnancy And Lactation Text: this medication is Pregnancy Category B and is considered safe during pregnancy. This medication is not directly found in breast milk but it's metabolite acyclovir is present.

## 2022-08-01 NOTE — ED PROVIDER NOTE - DOMESTIC TRAVEL HIGH RISK QUESTION
----- Message from Paul Fernandez DO sent at 8/1/2022  2:38 PM EDT -----  Please call patient regarding these results:  Recent thyroid levels and thyroglobulin level looks stable  Continue current regimen repeat labs prior to next appointment 
Labs are ordered and he should have the slip already to be done before next appt 
No

## 2022-09-06 NOTE — ED ADULT TRIAGE NOTE - TEMPERATURE IN CELSIUS (DEGREES C)
Quality 111:Pneumonia Vaccination Status For Older Adults: Pneumococcal Vaccination Previously Received
Detail Level: Detailed
Quality 110: Preventive Care And Screening: Influenza Immunization: Influenza Immunization Administered during Influenza season
36.8

## 2023-01-08 ENCOUNTER — EMERGENCY (EMERGENCY)
Facility: HOSPITAL | Age: 33
LOS: 1 days | Discharge: HOME | End: 2023-01-08
Admitting: EMERGENCY MEDICINE
Payer: MEDICAID

## 2023-01-08 VITALS
OXYGEN SATURATION: 100 % | HEART RATE: 100 BPM | DIASTOLIC BLOOD PRESSURE: 79 MMHG | RESPIRATION RATE: 17 BRPM | TEMPERATURE: 98 F | SYSTOLIC BLOOD PRESSURE: 124 MMHG

## 2023-01-08 LAB
APPEARANCE UR: ABNORMAL
BACTERIA # UR AUTO: ABNORMAL
BILIRUB UR-MCNC: NEGATIVE — SIGNIFICANT CHANGE UP
COLOR SPEC: SIGNIFICANT CHANGE UP
DIFF PNL FLD: ABNORMAL
EPI CELLS # UR: 4 /HPF — SIGNIFICANT CHANGE UP (ref 0–5)
GLUCOSE UR QL: ABNORMAL
HYALINE CASTS # UR AUTO: 4 /LPF — SIGNIFICANT CHANGE UP (ref 0–7)
KETONES UR-MCNC: NEGATIVE — SIGNIFICANT CHANGE UP
LEUKOCYTE ESTERASE UR-ACNC: ABNORMAL
NITRITE UR-MCNC: NEGATIVE — SIGNIFICANT CHANGE UP
PH UR: 7 — SIGNIFICANT CHANGE UP (ref 5–8)
PROT UR-MCNC: ABNORMAL
RBC CASTS # UR COMP ASSIST: 6 /HPF — HIGH (ref 0–4)
SP GR SPEC: 1.01 — SIGNIFICANT CHANGE UP (ref 1.01–1.05)
UROBILINOGEN FLD QL: SIGNIFICANT CHANGE UP
WBC UR QL: 91 /HPF — HIGH (ref 0–5)

## 2023-01-08 PROCEDURE — 99284 EMERGENCY DEPT VISIT MOD MDM: CPT

## 2023-01-08 PROCEDURE — 76830 TRANSVAGINAL US NON-OB: CPT | Mod: 26

## 2023-01-08 RX ORDER — FLUCONAZOLE 150 MG/1
150 TABLET ORAL ONCE
Refills: 0 | Status: COMPLETED | OUTPATIENT
Start: 2023-01-08 | End: 2023-01-08

## 2023-01-08 RX ORDER — NITROFURANTOIN MACROCRYSTAL 50 MG
1 CAPSULE ORAL
Qty: 10 | Refills: 0
Start: 2023-01-08 | End: 2023-01-12

## 2023-01-08 RX ORDER — IBUPROFEN 200 MG
600 TABLET ORAL ONCE
Refills: 0 | Status: COMPLETED | OUTPATIENT
Start: 2023-01-08 | End: 2023-01-08

## 2023-01-08 RX ADMIN — Medication 600 MILLIGRAM(S): at 21:01

## 2023-01-08 RX ADMIN — Medication 1 TABLET(S): at 20:59

## 2023-01-08 RX ADMIN — FLUCONAZOLE 150 MILLIGRAM(S): 150 TABLET ORAL at 20:59

## 2023-01-08 NOTE — ED PROVIDER NOTE - NSFOLLOWUPINSTRUCTIONS_ED_ALL_ED_FT
Advance activity as tolerated.  Continue all previously prescribed medications as directed unless otherwise instructed.  Take Diflucan one pill in three days for any persistent vaginal discharge, itching or irritation.  Follow up with your primary care physician and OB/GYN (referral list provided or call [phone] to make an appointment with the OB/GYN clinic)  in 48-72 hours- bring copies of your results.  Return to the ER for worsening or persistent symptoms, including but not limited to worsening/persistent fevers, flank pain, vomiting, lightheadedness, passing out, and/or ANY NEW OR CONCERNING SYMPTOMS. If you have issues obtaining follow up, please call: [phone] to obtain a doctor or specialist who takes your insurance in your area.  You may call [phone] to make an appointment with the internal medicine clinic. Advance activity as tolerated.  Continue all previously prescribed medications as directed unless otherwise instructed.  Take Diflucan one pill in three days for any persistent vaginal discharge, itching or irritation.  Take Bactrim one pill twice a day for 7 days.  Follow up with your primary care physician and OB/GYN (referral list provided or call [phone] to make an appointment with the OB/GYN clinic)  in 48-72 hours- bring copies of your results.  Return to the ER for worsening or persistent symptoms, including but not limited to worsening/persistent fevers, flank pain, vomiting, lightheadedness, passing out, pelvic pain, and/or ANY NEW OR CONCERNING SYMPTOMS. If you have issues obtaining follow up, please call: [phone] to obtain a doctor or specialist who takes your insurance in your area.  You may call [phone] to make an appointment with the internal medicine clinic. Advance activity as tolerated.  Continue all previously prescribed medications as directed unless otherwise instructed.  Take Tylenol 650mg (Two 325 mg pills) every 4-6 hours as needed for pain or fevers.  Take Diflucan one pill in three days for any persistent vaginal discharge, itching or irritation.  Take Macrobid 100 mg one pill twice a day for 5 days.  Follow up with your primary care physician and OB/GYN (referral list provided or call [phone] to make an appointment with the OB/GYN clinic)  in 48-72 hours- bring copies of your results.  Return to the ER for worsening or persistent symptoms, including but not limited to worsening/persistent fevers, flank pain, vomiting, lightheadedness, passing out, pelvic pain, and/or ANY NEW OR CONCERNING SYMPTOMS. If you have issues obtaining follow up, please call: [phone] to obtain a doctor or specialist who takes your insurance in your area.  You may call [phone] to make an appointment with the internal medicine clinic.

## 2023-01-08 NOTE — ED PROVIDER NOTE - PROGRESS NOTE DETAILS
NEREIDA YATES:  Sono with following impression: "Complex avascular cystic lesions in the right ovary with internal echoes,   possibly hemorrhagic cysts. Follow-up ultrasound can be obtained in 6   weeks to ensure resolution.    Normal blood flow to the ovaries.    Diffusely thickened endometrium, possibly secondary to menstrual phase."  Pt without pain at this time.  Not clinically concerning for ovarian torsion.  Pt to be given dose of Bactrim.  Pt medically stable for discharge.  Strict return precautions given.  Pt to follow up with PMD and OB/GYN. NEREIDA YATES:  Sono with following impression: "Complex avascular cystic lesions in the right ovary with internal echoes,   possibly hemorrhagic cysts. Follow-up ultrasound can be obtained in 6   weeks to ensure resolution.    Normal blood flow to the ovaries.    Diffusely thickened endometrium, possibly secondary to menstrual phase."  Pt evaluated by OB/GYN.  They state pt does not likely have ovarian torsion; they do not recommend any further intervention regarding ovarian cyst.  They recommend against Bactrim at this time as pt is actively trying to get pregnant; they recommend discharge on nitrofurantoin.  Pt medically stable for discharge.  Strict return precautions given.  PT to follow up with PMD and OB/GYN.

## 2023-01-08 NOTE — CONSULT NOTE ADULT - ATTENDING COMMENTS
Attending note   Agree with above  pt presents with 3 days of pain and vaginal itching   currently taking clomid   benign abdominal exam   TVUS as above, images reviewed   low suspicion for torsion   recommend avoiding NSAIDs as pt may be very early pregnancy   recommend Macrobid for UTI   and Monistat for yeast infection     TAM Garrett MD

## 2023-01-08 NOTE — CONSULT NOTE ADULT - ASSESSMENT
32 year old  LMP  presents to the ED complaining of vaginal itching and dysuria for three days duration. In the ED patient noted to have right sided adnexal tenderness so they subsequently got a TVUS that showed multiple cysts in the R ovary. GYN consulted for r/o torsion. In the ED vital signs stable. Physical exam with mild R adnexal tenderness.     -given patient's benign abdominal exam, low suspicion for torsion at this time.   -patient is currently undergoing infertility workup with [facility] in the city. She is currently on clomiphene, ovarian cysts and mild tenderness on patient's right side most likely 2/2 [facility] treatment  -given the fact patient is trying to actively become pregnancy would recommend Monistat for Candidiasis treatment and Macrobid for UTI treatment  -All questions answered to patient's satisfaction  -No acute GYN intervention at this time; patient cleared for d/c from GYN perspective    discussed w Dr Julio Cesar Lemus PGY2

## 2023-01-08 NOTE — ED PROVIDER NOTE - GASTROINTESTINAL NEGATIVE STATEMENT, MLM
no abdominal pain, no bloating, no constipation, no diarrhea, no nausea and no vomiting.
moderate assist (50% patients effort)

## 2023-01-08 NOTE — ED PROVIDER NOTE - CARE PLAN
1 Principal Discharge DX:	Vaginal itching  Secondary Diagnosis:	Pain pelvic   Principal Discharge DX:	Vaginal itching  Secondary Diagnosis:	Pain pelvic  Secondary Diagnosis:	UTI (urinary tract infection)

## 2023-01-08 NOTE — ED PROVIDER NOTE - CLINICAL SUMMARY MEDICAL DECISION MAKING FREE TEXT BOX
Patient is a 32-year-old female with past medical history of hyperlipidemia, yeast infection presenting with vaginal itching and dysuria x4 days.  Patient reports developing vaginal itching and white-colored vaginal discharge associated with pelvic pressure and dysuria.  Patient reports symptoms similar to when she had yeast infection in the past.  Patient denies any fevers, chills, nausea, vomiting, flank pain, history of STDs, diarrhea, constipation.  Patient speaks radha and english;  translating per patient's preference.  Clinical pictures supports yeast infection; possible UTI; not clinically concerning for pyelonephritis.  Will order urinalysis, urine culture, gc, tvus, diflucan.  Disposition pending work up.

## 2023-01-08 NOTE — CONSULT NOTE ADULT - SUBJECTIVE AND OBJECTIVE BOX
JEANINE TERAN  32y  Female [id number]    HPI: 32 year old  LMP  presents to the ED complaining of vaginal itching and dysuria for three days duration. In the ED patient was diagnosed with vaginal candidiasis and a UTI. On ED evaluation, the patient was noted to have right adnexal tenderness so they ordered her TVUS. On TVUS patient found to have 3 right ovarian cysts measuring 4x2.9x2.8, 3.7x3x2.7, and 2.6x2x2.3cm. GYN consulted for rule out torsion.    On GYN evaluation patient states that the right sided lower pelvic pain started three days ago along with the vaginal itching and dysuria. Patient states that the pain is constant in nature. She states that she took Tylenol once over the course of three days for the pain, which helped the pain. Patient recieved 600 mg of motrin in the ED just prior to GYN evaluation. On GYN evaluation, patient rated the pain as a 4/10.    Of note patient is currently being followed by [facility] for infertility. She is currently on clomeniphine citrate. Patient endorses regular menstrual cycles and is currently sexually active with her partner. Patient endorsed some vaginal spotting yesterday afternoon that she noticed when she wiped after using the bathroom. Other than this event of vaginal bleeding, patient denies any intermenstrual bleeding.    Name of GYN Physician: Clinic    POB:  x2  Pgyn: +Cysts, Denies fibroids, STI's, Abnormal pap smears   PMHx: Denies  Meds: Clomiphene   Surgeries: Denies  All: NKDA  Social: Denies toxic habits x3    Vital Signs Last 24 Hrs  T(C): 36.6 (2023 16:59), Max: 36.6 (2023 16:59)  T(F): 97.9 (2023 16:59), Max: 97.9 (2023 16:59)  HR: 100 (2023 16:59) (100 - 100)  BP: 124/79 (2023 16:59) (124/79 - 124/79)  BP(mean): --  RR: 17 (2023 16:59) (17 - 17)  SpO2: 100% (2023 16:59) (100% - 100%)    Parameters below as of 2023 16:59  Patient On (Oxygen Delivery Method): room air        Physical Exam:   General: sitting comfortably in bed, NAD   CV: RR S1S2 no m/r/g  Lungs: CTA b/l, good air flow b/l   Abd: Soft, non-tender, non-distended.  Bowel sounds present.    :  No bleeding on pad.  External labia wnl.  Bimanual exam with no cervical motion tenderness, uterus wnl. Mild right sided adnexal tenderness. Cervix closed  Speculum Exam: No active bleeding from os.  Thick white discharge in the vaginal vault  Ext: non-tender b/l, no edema     LABS:                I&O's Detail      Urinalysis Basic - ( 2023 17:41 )    Color: Light Yellow / Appearance: Slightly Turbid / S.012 / pH: x  Gluc: x / Ketone: Negative  / Bili: Negative / Urobili: <2 mg/dL   Blood: x / Protein: Trace / Nitrite: Negative   Leuk Esterase: Large / RBC: 6 /HPF / WBC 91 /HPF   Sq Epi: x / Non Sq Epi: 4 /HPF / Bacteria: Few        RADIOLOGY & ADDITIONAL STUDIES:    ACC: [id number] EXAM:  US TRANSVAGINAL                          PROCEDURE DATE:  2023          INTERPRETATION:  CLINICAL INFORMATION: Pelvic pain.    LMP: 12/15/2022    COMPARISON: Ultrasound pelvis 10/24/2021    TECHNIQUE:  Endovaginal pelvic sonogram as per order. Transabdominal pelvic sonogram   was also performed as part of our standard protocol. Color and Spectral   Doppler was performed.    FINDINGS:  Uterus: 9.1 cm x 5.0 cm x 5.6 cm. Within normal limits.  Endometrium: 17 mm. Diffusely thickened endometrium.    Right ovary: 6.6 cm x 5.7 cm x 3.7 cm. Complex avascular cystic lesion   with internal echoes and thin septation measuring 4.0 x 2.9 x 2.8 cm.   Simple cyst measuring 3.7 x 3.0 x 2.7 cm. Complex avascular cystic lesion   with internal echoes measuring 2.6 x 2.0 x 2.3 cm. Normal arterial and   venous waveforms.  Left ovary: 4.8 cm x 3.8 cm x 3.6 cm. Dominant follicle. Normal arterial   and venous waveforms.    Fluid: Trace free fluid.    IMPRESSION:  Complex avascular cystic lesions in the right ovary with internal echoes,   possibly hemorrhagic cysts. Follow-up ultrasound can be obtained in 6   weeks to ensure resolution.    Normal blood flow to the ovaries.    Diffusely thickened endometrium, possibly secondary tomenstrual phase.    --- End of Report ---          NAWAF DIOP MD; Resident Radiologist  This document has been electronically signed.  SUSAN SYED MD; Attending Radiologist  This document has been electronically signed. 2023  8:11PM

## 2023-01-08 NOTE — ED PROVIDER NOTE - PATIENT PORTAL LINK FT
"Encounter Date: 12/26/2019       History     Chief Complaint   Patient presents with    Abscess     "I dont know if its a aaliyah or a spider bite or what, but it hurts" CO pain, swelling, abd drainage under laft arm Xs 3 days.      Patient is 53 year old male HIV positive on antiviral meds with undetectable viral load who presents with complaints of abscess to the left axillary region. He reports symptoms started as a small bump three days ago and became worse and worse. He reports his significant other squeezed pus from the area yesterday with "a lot os pus coming out". He reports the pain and swelling around the area got worse. He reports no other lesions but admits that he has had similar lesions on the right side in the recent past. He denies associated fever, chills, nausea, vomiting, chest pain or SOB. He is currently unaccompanied in the ER.     The history is provided by the patient.     Review of patient's allergies indicates:   Allergen Reactions    Iodine and iodide containing products     Shellfish containing products      Past Medical History:   Diagnosis Date    HIV (human immunodeficiency virus infection)     Hypertension     Immune deficiency disorder     Seizures      No past surgical history on file.  Family History   Problem Relation Age of Onset    Cancer Father      Social History     Tobacco Use    Smoking status: Never Smoker    Smokeless tobacco: Never Used   Substance Use Topics    Alcohol use: Yes     Alcohol/week: 30.0 standard drinks     Types: 30 Cans of beer per week     Comment: Pt reports drinking about 1 6 pack of beer daily.     Drug use: No     Review of Systems   Constitutional: Negative for fever.   HENT: Negative for sore throat.    Respiratory: Negative for shortness of breath.    Cardiovascular: Negative for chest pain.   Gastrointestinal: Negative for nausea.   Genitourinary: Negative for dysuria.   Musculoskeletal: Negative for back pain.   Skin: Negative for rash. "        abscess   Neurological: Negative for weakness.   Hematological: Does not bruise/bleed easily.       Physical Exam     Initial Vitals [12/26/19 2005]   BP Pulse Resp Temp SpO2   (!) 175/95 89 16 98.4 °F (36.9 °C) 98 %      MAP       --         Physical Exam    Nursing note and vitals reviewed.  Constitutional: He appears well-developed and well-nourished. He is not diaphoretic. No distress.   Healthy appearing male in NAD or apparent pain. He makes good eye contact, speaks in clear full sentences and ambulates with ease.    HENT:   Head: Normocephalic and atraumatic.   Eyes: Conjunctivae and EOM are normal. Pupils are equal, round, and reactive to light.   Neck: Normal range of motion.   Cardiovascular: Normal rate, regular rhythm, normal heart sounds and intact distal pulses. Exam reveals no gallop and no friction rub.    No murmur heard.  Pulmonary/Chest: Breath sounds normal. He has no wheezes. He has no rhonchi. He has no rales.       Abdominal: Soft. Bowel sounds are normal. He exhibits no distension. There is no tenderness. There is no rebound and no guarding.   Musculoskeletal: Normal range of motion.   Lymphadenopathy:     He has no cervical adenopathy.   Neurological: He is alert and oriented to person, place, and time. He has normal strength. No cranial nerve deficit or sensory deficit. GCS score is 15. GCS eye subscore is 4. GCS verbal subscore is 5. GCS motor subscore is 6.   Skin: Skin is warm. Capillary refill takes less than 2 seconds. Abscess noted. No rash noted. No erythema. No pallor.   Psychiatric: He has a normal mood and affect. His behavior is normal. Judgment and thought content normal.         ED Course   I & D - Incision and Drainage  Date/Time: 12/26/2019 9:20 PM  Performed by: Nisa Orona PA-C  Authorized by: Ruth Santamaria MD   Consent Done: Yes  Consent: Verbal consent obtained.  Type: abscess  Location: left upper chest wall.  Anesthesia: local  infiltration    Anesthesia:  Local Anesthetic: lidocaine 1% without epinephrine  Patient sedated: no  Scalpel size: 11  Incision type: single straight  Complexity: simple  Drainage: pus and  purulent  Drainage amount: moderate  Wound treatment: incision,  drainage and  wound left open  Complications: No  Specimens: No  Implants: No  Patient tolerance: Patient tolerated the procedure well with no immediate complications        Labs Reviewed - No data to display       Imaging Results    None          Medical Decision Making:   ED Management:  Urgent evaluation of 53 year old male who presents with complaints of abscess to the left mid axillary line on the chest wall. He is afebrile, non-toxic appearing and hemodynamically stable. Physical exam outlined above and reveals area of abscess with surrounding cellulitis. Area is I&D'd with good result and no complication. Moderate amount of pus is expressed and wound is left open. He is placed on Clindamycin and topical Bactroban and encouraged to apply warm compresses and to monitor wound closely. He is encouraged to report back to the ER in 2 days for wound re-check, sooner if worsening is noticed. He is educate don PCP follow-up plan and verbalizes understanding. He is amenable to plan and stable for discharge.                                  Clinical Impression:       ICD-10-CM ICD-9-CM   1. Encounter for incision and drainage procedure Z01.89 V72.85   2. Cellulitis, unspecified cellulitis site L03.90 682.9                             Nisa Orona PA-C  12/26/19 1944     You can access the FollowMyHealth Patient Portal offered by [facility] by registering at the following website: [email or web address]. By joining [employer]’s FollowMyHealth portal, you will also be able to view your health information using other applications (apps) compatible with our system. You can access the FollowMyHealth Patient Portal offered by [facility] by registering at the following website: [email or web address]. By joining [employer]’s FollowMyHealth portal, you will also be able to view your health information using other applications (apps) compatible with our system.

## 2023-01-08 NOTE — ED PROVIDER NOTE - CHIEF COMPLAINT
The patient is a 32y Female complaining of vaginal itching. <-- Click to add NO significant Past Surgical History

## 2023-01-09 LAB
CULTURE RESULTS: SIGNIFICANT CHANGE UP
SPECIMEN SOURCE: SIGNIFICANT CHANGE UP

## 2023-01-10 LAB
C TRACH RRNA SPEC QL NAA+PROBE: SIGNIFICANT CHANGE UP
N GONORRHOEA RRNA SPEC QL NAA+PROBE: SIGNIFICANT CHANGE UP

## 2023-04-25 NOTE — ED PROVIDER NOTE - IV ALTEPLASE INCLUSION HIDDEN
Patient: Allegra Skaggs    YOB: 1986    Date: 05/01/2023    Primary Care Provider: Nanda Shah APRN    Chief Complaint   Patient presents with   • Skin Lesion     anus       SUBJECTIVE:    History of present illness:  The patient is in the office today for evaluation and treatment of lump at the opening of her anus that she originally thought was a hemorrhoid, has been treated for almost a year and has continued to grow. Was unresponsive to any hemorrhoid treatment she has tried. She states that it is painful to touch. Onset was 2 years ago. She denies drainage. She denies constipation and any other GI complaints. Negative family history for inflammatory bowel disease and colon cancer.    The following portions of the patient's history were reviewed and updated as appropriate: allergies, current medications, past family history, past medical history, past social history, past surgical history and problem list.      Review of Systems   Constitutional: Negative for chills, fever and unexpected weight change.   HENT: Negative for hearing loss, trouble swallowing and voice change.    Eyes: Negative for visual disturbance.   Respiratory: Negative for apnea, cough, chest tightness, shortness of breath and wheezing.    Cardiovascular: Negative for chest pain, palpitations and leg swelling.   Gastrointestinal: Positive for rectal pain. Negative for abdominal distention, abdominal pain, anal bleeding, blood in stool, constipation, diarrhea, nausea and vomiting.   Endocrine: Negative for cold intolerance and heat intolerance.   Genitourinary: Negative for difficulty urinating, dysuria and flank pain.   Musculoskeletal: Negative for back pain and gait problem.   Skin: Negative for color change, rash and wound.   Neurological: Negative for dizziness, syncope, speech difficulty, weakness, light-headedness, numbness and headaches.   Hematological: Negative for adenopathy. Does not bruise/bleed easily.  "  Psychiatric/Behavioral: Negative for confusion. The patient is not nervous/anxious.        Allergies:  No Known Allergies    Medications:  No current outpatient medications on file.    History:  Past Medical History:   Diagnosis Date   • Seasonal allergies        Past Surgical History:   Procedure Laterality Date   •  SECTION         Family History   Problem Relation Age of Onset   • Diabetes Mother    • Cancer Father         skin   • Heart disease Father    • Thyroid disease Father    • Diabetes Sister    • Diabetes Brother    • Cancer Maternal Aunt    • Diabetes Maternal Grandmother        Social History     Tobacco Use   • Smoking status: Former     Types: Cigarettes     Quit date: 2020     Years since quittin.7   Vaping Use   • Vaping Use: Every day   • Substances: Nicotine, Flavoring   Substance Use Topics   • Alcohol use: Never   • Drug use: Never        OBJECTIVE:    Vital Signs:   Vitals:    23 1315   BP: 132/70   Pulse: 79   Resp: 18   Temp: 98.7 °F (37.1 °C)   TempSrc: Temporal   SpO2: 98%   Weight: 105 kg (231 lb 12.8 oz)   Height: 165.1 cm (65\")       Physical Exam:   General Appearance:    Alert, cooperative, in no acute distress   Head:    Normocephalic, without obvious abnormality, atraumatic   Eyes:            Lids and lashes normal, conjunctivae and sclerae normal, no   icterus, no pallor, corneas clear, PERRLA   Ears:    Ears appear intact with no abnormalities noted   Throat:   No oral lesions, no thrush, oral mucosa moist   Neck:   No adenopathy, supple, trachea midline, no thyromegaly, no   carotid bruit, no JVD   Lungs:     Clear to auscultation,respirations regular, even and                  unlabored    Heart:    Regular rhythm and normal rate, normal S1 and S2, no            murmur, no gallop, no rub, no click   Chest Wall:    No abnormalities observed   Abdomen:     Normal bowel sounds, no masses, no organomegaly, soft        non-tender, non-distended, no guarding, " no rebound                Tenderness  Rectal: Posterior midline skin tag with small amount of provoked drainage, possible fistula   Extremities:   Moves all extremities well, no edema, no cyanosis, no             redness   Pulses:   Pulses palpable and equal bilaterally   Skin:   No bleeding, bruising or rash   Lymph nodes:   No palpable adenopathy   Neurologic:   Cranial nerves 2 - 12 grossly intact, sensation intact, DTR       present and equal bilaterally     Results Review:   None    Review of Systems was reviewed and confirmed as accurate as documented by the MA.    ASSESSMENT/PLAN:    1. Anal lesion      Patient was referred to me for a skin lesion around her anus. After physical exam, there is a posterior midline skin tag/lesion that is firm and tender to palpation. I was able to elicit drainage with palpation. I am concerned that she has a fistula in ano. Because of discomfort, I am recommending rectal examination under anesthesia to further investigate the lesion. She will possible require a fistulotomy depending on intra-operative findings. The procedure was discussed with the patient in office today and all of her questions were answered.     I discussed the patients findings and my recommendations with patient        Electronically signed by Huong Sims DO  05/01/23           show

## 2023-09-14 NOTE — OB RN PATIENT PROFILE - NS_VBACATTEMPT_OBGYN_ALL_OB
If any questions arise, call your provider.  If your provider is not available, please feel free to call the Surgical Center at [phone].    MEDICATIONS: Resume taking daily medication.  Take prescribed pain medication with food.  If no medication is prescribed, you may take non-aspirin pain medication if needed.  PAIN MEDICATION CAN BE VERY CONSTIPATING.  Take a stool softener or laxative such as senokot, pericolace, or milk of magnesia if needed.    Last pain medication given at  5mg OXYCODONE given at 3:17pm.     What to Expect Post Anesthesia    Rest and take it easy for the first 24 hours.  A responsible adult is recommended to remain with you during that time.  It is normal to feel sleepy.  We encourage you to not do anything that requires balance, judgment or coordination.    FOR 24 HOURS DO NOT:  Drive, operate machinery or run household appliances.  Drink beer or alcoholic beverages.  Make important decisions or sign legal documents.    To avoid nausea, slowly advance diet as tolerated, avoiding spicy or greasy foods for the first day.  Add more substantial food to your diet according to your provider's instructions.  Babies can be fed formula or breast milk as soon as they are hungry.  INCREASE FLUIDS AND FIBER TO AVOID CONSTIPATION.    MILD FLU-LIKE SYMPTOMS ARE NORMAL.  YOU MAY EXPERIENCE GENERALIZED MUSCLE ACHES, THROAT IRRITATION, HEADACHE AND/OR SOME NAUSEA.       N/A

## 2023-10-03 ENCOUNTER — EMERGENCY (EMERGENCY)
Facility: HOSPITAL | Age: 33
LOS: 1 days | Discharge: LEFT BEFORE DISCHARGE | End: 2023-10-03
Admitting: EMERGENCY MEDICINE
Payer: MEDICAID

## 2023-10-03 VITALS
DIASTOLIC BLOOD PRESSURE: 53 MMHG | SYSTOLIC BLOOD PRESSURE: 100 MMHG | TEMPERATURE: 100 F | OXYGEN SATURATION: 98 % | HEART RATE: 126 BPM | RESPIRATION RATE: 16 BRPM

## 2023-10-03 PROCEDURE — L9991: CPT

## 2023-11-30 NOTE — ED ADULT NURSE NOTE - CAS EDN DISCHARGE ASSESSMENT
Weekly bp check  Limit sodium   Alert and oriented to person, place and time/Patient baseline mental status

## 2024-01-09 NOTE — PATIENT PROFILE ADULT - NSPROMEDSPATCH_GEN_A_NUR
Lacie notified rx sent.   
Last seen 11/2023, has an appt 1/30/2024. She takes tramadol 50 mg BID for cough and is out. A refill was sent 11/14/2023 with no additional refills. Would you please sent short term refill?  
none

## 2024-02-14 ENCOUNTER — NON-APPOINTMENT (OUTPATIENT)
Age: 34
End: 2024-02-14

## 2024-02-14 ENCOUNTER — APPOINTMENT (OUTPATIENT)
Dept: OBGYN | Facility: CLINIC | Age: 34
End: 2024-02-14
Payer: MEDICAID

## 2024-02-14 VITALS — BODY MASS INDEX: 30.47 KG/M2 | SYSTOLIC BLOOD PRESSURE: 114 MMHG | DIASTOLIC BLOOD PRESSURE: 73 MMHG | WEIGHT: 172 LBS

## 2024-02-14 DIAGNOSIS — Z01.419 ENCOUNTER FOR GYNECOLOGICAL EXAMINATION (GENERAL) (ROUTINE) W/OUT ABNORMAL FINDINGS: ICD-10-CM

## 2024-02-14 DIAGNOSIS — N91.2 AMENORRHEA, UNSPECIFIED: ICD-10-CM

## 2024-02-14 PROCEDURE — 0501F PRENATAL FLOW SHEET: CPT

## 2024-02-14 PROCEDURE — 36415 COLL VENOUS BLD VENIPUNCTURE: CPT

## 2024-02-14 RX ORDER — ASCORBIC ACID, CHOLECALCIFEROL, .ALPHA.-TOCOPHEROL ACETATE, DL-, PYRIDOXINE, FOLIC ACID, CYANOCOBALAMIN, CALCIUM, FERROUS FUMARATE, MAGNESIUM, DOCONEXENT 85; 200; 10; 25; 1; 12; 140; 27; 45; 300 [IU]/1; [IU]/1; [IU]/1; [IU]/1; MG/1; UG/1; MG/1; MG/1; MG/1; MG/1
27-0.6-0.4-3 CAPSULE, GELATIN COATED ORAL
Qty: 90 | Refills: 3 | Status: ACTIVE | COMMUNITY
Start: 2024-02-14 | End: 1900-01-01

## 2024-02-15 LAB
ABO + RH PNL BLD: NORMAL
APPEARANCE: ABNORMAL
BACTERIA: ABNORMAL /HPF
BASOPHILS # BLD AUTO: 0.04 K/UL
BASOPHILS NFR BLD AUTO: 0.3 %
BILIRUBIN URINE: NEGATIVE
BLD GP AB SCN SERPL QL: NORMAL
BLOOD URINE: NEGATIVE
C TRACH RRNA SPEC QL NAA+PROBE: NOT DETECTED
CAST: 0 /LPF
COLOR: YELLOW
EOSINOPHIL # BLD AUTO: 0.09 K/UL
EOSINOPHIL NFR BLD AUTO: 0.7 %
EPITHELIAL CELLS: 21 /HPF
ESTIMATED AVERAGE GLUCOSE: 117 MG/DL
GLUCOSE QUALITATIVE U: NEGATIVE MG/DL
GLUCOSE SERPL-MCNC: 105 MG/DL
HBA1C MFR BLD HPLC: 5.7 %
HCT VFR BLD CALC: 36.8 %
HGB A MFR BLD: 97.3 %
HGB A2 MFR BLD: 2.7 %
HGB BLD-MCNC: 11.6 G/DL
HGB FRACT BLD-IMP: NORMAL
HIV1+2 AB SPEC QL IA.RAPID: NONREACTIVE
HPV 16 E6+E7 MRNA CVX QL NAA+PROBE: NOT DETECTED
HPV HIGH+LOW RISK DNA PNL CVX: NOT DETECTED
HPV18+45 E6+E7 MRNA CVX QL NAA+PROBE: NOT DETECTED
IMM GRANULOCYTES NFR BLD AUTO: 0.7 %
KETONES URINE: NEGATIVE MG/DL
LEUKOCYTE ESTERASE URINE: ABNORMAL
LYMPHOCYTES # BLD AUTO: 2.77 K/UL
LYMPHOCYTES NFR BLD AUTO: 22.5 %
MAN DIFF?: NORMAL
MCHC RBC-ENTMCNC: 28.3 PG
MCHC RBC-ENTMCNC: 31.5 GM/DL
MCV RBC AUTO: 89.8 FL
MEV IGG FLD QL IA: >300 AU/ML
MEV IGG+IGM SER-IMP: POSITIVE
MICROSCOPIC-UA: NORMAL
MONOCYTES # BLD AUTO: 0.87 K/UL
MONOCYTES NFR BLD AUTO: 7.1 %
MUV AB SER-ACNC: POSITIVE
MUV IGG SER QL IA: 39.5 AU/ML
N GONORRHOEA RRNA SPEC QL NAA+PROBE: NOT DETECTED
NEUTROPHILS # BLD AUTO: 8.44 K/UL
NEUTROPHILS NFR BLD AUTO: 68.7 %
NITRITE URINE: NEGATIVE
PH URINE: 7
PLATELET # BLD AUTO: 284 K/UL
PROTEIN URINE: NEGATIVE MG/DL
RBC # BLD: 4.1 M/UL
RBC # FLD: 16.7 %
RED BLOOD CELLS URINE: 0 /HPF
RUBV IGG FLD-ACNC: 20.5 INDEX
RUBV IGG SER-IMP: POSITIVE
SOURCE AMPLIFICATION: NORMAL
SPECIFIC GRAVITY URINE: 1.01
T PALLIDUM AB SER QL IA: NEGATIVE
T4 FREE SERPL-MCNC: 1.1 NG/DL
TSH SERPL-ACNC: 2.41 UIU/ML
UROBILINOGEN URINE: 0.2 MG/DL
WBC # FLD AUTO: 12.3 K/UL
WHITE BLOOD CELLS URINE: 22 /HPF

## 2024-02-16 LAB
BACTERIA UR CULT: NORMAL
HBV SURFACE AG SER QL: NONREACTIVE
HCV AB SER QL: NONREACTIVE
HCV S/CO RATIO: 0.07 S/CO
LEAD BLD-MCNC: 1.2 UG/DL

## 2024-02-16 NOTE — HISTORY OF PRESENT ILLNESS
[FreeTextEntry1] : Patient is a 34 year old P1 presenting for an annual visit. LMP 12/2/23. She is feeling well and is without complaints. She denies vaginal itching, odor and discharge. Denies urinary urgency, frequency and dysuria. She also presents after she had a positive home pregnancy test. She is endorsing occasional nausea and breast tenderness. Pt reports h/o hypothyroidism was previously taking levo 25mcg.  Patient reports h/o PPROM at 20 weeks, fetal demise.

## 2024-02-16 NOTE — PLAN
[FreeTextEntry1] : 33 y/o female presenting for annual exam with amenorrhea  -f/u PAP and GC/CT done today -f/u prenatal blood work drawn today -Rx sent for PNV  -RTO 2 weeks for first trimester ultrasound and review of prenatal lab results

## 2024-02-18 LAB
CHROMOSOME13 INTERPRETATION: NORMAL
CHROMOSOME13 TEST RESULT: NORMAL
CHROMOSOME18 INTERPRETATION: NORMAL
CHROMOSOME18 TEST RESULT: NORMAL
CHROMOSOME21 INTERPRETATION: NORMAL
CHROMOSOME21 TEST RESULT: NORMAL
FETAL FRACTION: NORMAL
PERFORMANCE AND LIMITATIONS: NORMAL
SEX CHROMOSOME INTERPRETATION: NORMAL
SEX CHROMOSOME TEST RESULT: NORMAL
VERIFI PRENATAL TEST: NOT DETECTED

## 2024-02-23 LAB
AR GENE MUT ANL BLD/T: NORMAL
CFTR MUT TESTED BLD/T: NEGATIVE
CYTOLOGY CVX/VAG DOC THIN PREP: ABNORMAL
FMR1 GENE MUT ANL BLD/T: NORMAL
M TB IFN-G BLD-IMP: NEGATIVE
QUANTIFERON TB PLUS MITOGEN MINUS NIL: >10 IU/ML
QUANTIFERON TB PLUS NIL: 0.03 IU/ML
QUANTIFERON TB PLUS TB1 MINUS NIL: -0.01 IU/ML
QUANTIFERON TB PLUS TB2 MINUS NIL: -0.01 IU/ML

## 2024-02-26 ENCOUNTER — ASOB RESULT (OUTPATIENT)
Age: 34
End: 2024-02-26

## 2024-02-26 ENCOUNTER — APPOINTMENT (OUTPATIENT)
Dept: OBGYN | Facility: CLINIC | Age: 34
End: 2024-02-26
Payer: MEDICAID

## 2024-02-26 ENCOUNTER — APPOINTMENT (OUTPATIENT)
Dept: ANTEPARTUM | Facility: CLINIC | Age: 34
End: 2024-02-26
Payer: MEDICAID

## 2024-02-26 VITALS — BODY MASS INDEX: 29.58 KG/M2 | SYSTOLIC BLOOD PRESSURE: 117 MMHG | WEIGHT: 167 LBS | DIASTOLIC BLOOD PRESSURE: 74 MMHG

## 2024-02-26 PROCEDURE — 0502F SUBSEQUENT PRENATAL CARE: CPT

## 2024-02-26 PROCEDURE — 76813 OB US NUCHAL MEAS 1 GEST: CPT

## 2024-02-26 PROCEDURE — 76801 OB US < 14 WKS SINGLE FETUS: CPT

## 2024-03-02 LAB
ADDITIONAL US: NORMAL
CRL SCAN TWIN B: NORMAL
CRL SCAN: NORMAL
CROWN RUMP LENGTH TWIN B: NORMAL
CROWN RUMP LENGTH: 55.7 MM
DIA MOM: 1.26
DIA VALUE: 293 PG/ML
DOWN SYNDROME AGE RISK: NORMAL
DOWN SYNDROME INTERPRETATION: NORMAL
DOWN SYNDROME SCREENING RISK: NORMAL
FIRST TRIMESTER SCREEN COMMENTS: NORMAL
FIRST TRIMESTER SCREEN NOTE: NORMAL
FIRST TRIMESTER SCREEN RESULTS: NORMAL
FIRST TRIMESTER SCREEN TEST RESULTS: NORMAL
GEST. AGE ON COLLECTION DATE: 12 WEEKS
HCG MOM: 1.1
HCG VALUE: 109.7 IU/ML
MATERNAL AGE AT EDD: 34.6 YR
NT MOM TWIN B: NORMAL
NT TWIN B: NORMAL
NUCHAL TRANSLUCENCY (NT): 1.3 MM
NUCHAL TRANSLUCENCY MOM: 0.89
NUMBER OF FETUSES: 1
PAPP-A MOM: 0.88
PAPP-A VALUE: 653.5 NG/ML
RACE: NORMAL
SONOGRAPHER ID#: NORMAL
TRISOMY 18 AGE RISK: NORMAL
TRISOMY 18 INTERPRETATION: NORMAL
TRISOMY 18 SCREENING RISK: NORMAL
WEIGHT AFP: 167 LBS

## 2024-03-11 ENCOUNTER — ASOB RESULT (OUTPATIENT)
Age: 34
End: 2024-03-11

## 2024-03-11 ENCOUNTER — APPOINTMENT (OUTPATIENT)
Dept: ANTEPARTUM | Facility: CLINIC | Age: 34
End: 2024-03-11
Payer: MEDICAID

## 2024-03-11 ENCOUNTER — APPOINTMENT (OUTPATIENT)
Dept: OBGYN | Facility: CLINIC | Age: 34
End: 2024-03-11
Payer: MEDICAID

## 2024-03-11 VITALS — SYSTOLIC BLOOD PRESSURE: 109 MMHG | BODY MASS INDEX: 29.41 KG/M2 | WEIGHT: 166 LBS | DIASTOLIC BLOOD PRESSURE: 73 MMHG

## 2024-03-11 PROCEDURE — 76815 OB US LIMITED FETUS(S): CPT

## 2024-03-11 PROCEDURE — 0502F SUBSEQUENT PRENATAL CARE: CPT

## 2024-03-11 PROCEDURE — 76817 TRANSVAGINAL US OBSTETRIC: CPT

## 2024-03-12 LAB
APPEARANCE: CLEAR
BACTERIA: NEGATIVE /HPF
BILIRUBIN URINE: NEGATIVE
BLOOD URINE: NEGATIVE
CAST: 0 /LPF
COLOR: YELLOW
EPITHELIAL CELLS: 1 /HPF
GLUCOSE QUALITATIVE U: NEGATIVE MG/DL
KETONES URINE: NEGATIVE MG/DL
LEUKOCYTE ESTERASE URINE: NEGATIVE
MICROSCOPIC-UA: NORMAL
NITRITE URINE: NEGATIVE
PH URINE: 7.5
PROTEIN URINE: NEGATIVE MG/DL
RED BLOOD CELLS URINE: 0 /HPF
SPECIFIC GRAVITY URINE: 1
UROBILINOGEN URINE: 0.2 MG/DL
WHITE BLOOD CELLS URINE: 0 /HPF

## 2024-03-14 LAB — BACTERIA UR CULT: NORMAL

## 2024-03-25 ENCOUNTER — APPOINTMENT (OUTPATIENT)
Dept: OBGYN | Facility: CLINIC | Age: 34
End: 2024-03-25
Payer: MEDICAID

## 2024-03-25 ENCOUNTER — APPOINTMENT (OUTPATIENT)
Dept: ANTEPARTUM | Facility: CLINIC | Age: 34
End: 2024-03-25
Payer: MEDICAID

## 2024-03-25 ENCOUNTER — ASOB RESULT (OUTPATIENT)
Age: 34
End: 2024-03-25

## 2024-03-25 VITALS
SYSTOLIC BLOOD PRESSURE: 118 MMHG | WEIGHT: 171 LBS | BODY MASS INDEX: 30.3 KG/M2 | HEIGHT: 63 IN | DIASTOLIC BLOOD PRESSURE: 78 MMHG

## 2024-03-25 PROCEDURE — 0502F SUBSEQUENT PRENATAL CARE: CPT

## 2024-03-25 PROCEDURE — 76817 TRANSVAGINAL US OBSTETRIC: CPT

## 2024-03-29 LAB
AFP MOM: 0.92
AFP VALUE: 29.6 NG/ML
ALPHA FETOPROTEIN SERUM COMMENT: NORMAL
ALPHA FETOPROTEIN SERUM INTERPRETATION: NORMAL
ALPHA FETOPROTEIN SERUM RESULTS: NORMAL
ALPHA FETOPROTEIN SERUM TEST RESULTS: NORMAL
GESTATIONAL AGE BASED ON: NORMAL
GESTATIONAL AGE ON COLLECTION DATE: 16.3 WEEKS
INSULIN DEP DIABETES: NO
MATERNAL AGE AT EDD AFP: 34.5 YR
MULTIPLE GESTATION: NO
OSBR RISK 1 IN: NORMAL
RACE: NORMAL
WEIGHT AFP: 171 LBS

## 2024-04-08 ENCOUNTER — ASOB RESULT (OUTPATIENT)
Age: 34
End: 2024-04-08

## 2024-04-08 ENCOUNTER — LABORATORY RESULT (OUTPATIENT)
Age: 34
End: 2024-04-08

## 2024-04-08 ENCOUNTER — APPOINTMENT (OUTPATIENT)
Dept: ANTEPARTUM | Facility: CLINIC | Age: 34
End: 2024-04-08
Payer: MEDICAID

## 2024-04-08 ENCOUNTER — APPOINTMENT (OUTPATIENT)
Dept: OBGYN | Facility: CLINIC | Age: 34
End: 2024-04-08
Payer: MEDICAID

## 2024-04-08 VITALS
DIASTOLIC BLOOD PRESSURE: 72 MMHG | BODY MASS INDEX: 30.3 KG/M2 | WEIGHT: 171 LBS | SYSTOLIC BLOOD PRESSURE: 111 MMHG | HEIGHT: 63 IN

## 2024-04-08 PROCEDURE — 0502F SUBSEQUENT PRENATAL CARE: CPT

## 2024-04-08 PROCEDURE — 36415 COLL VENOUS BLD VENIPUNCTURE: CPT

## 2024-04-08 PROCEDURE — 76817 TRANSVAGINAL US OBSTETRIC: CPT

## 2024-04-08 PROCEDURE — 76815 OB US LIMITED FETUS(S): CPT

## 2024-04-10 ENCOUNTER — APPOINTMENT (OUTPATIENT)
Dept: OBGYN | Facility: CLINIC | Age: 34
End: 2024-04-10
Payer: MEDICAID

## 2024-04-10 PROCEDURE — ZZZZZ: CPT

## 2024-04-11 LAB
ALBUMIN SERPL ELPH-MCNC: 3.8 G/DL
ALP BLD-CCNC: 45 U/L
ALT SERPL-CCNC: 12 U/L
ANION GAP SERPL CALC-SCNC: 13 MMOL/L
AST SERPL-CCNC: 14 U/L
BILIRUB SERPL-MCNC: 0.2 MG/DL
BUN SERPL-MCNC: 7 MG/DL
CALCIUM SERPL-MCNC: 9.1 MG/DL
CHLORIDE SERPL-SCNC: 103 MMOL/L
CO2 SERPL-SCNC: 22 MMOL/L
CREAT SERPL-MCNC: 0.6 MG/DL
EGFR: 121 ML/MIN/1.73M2
GLUCOSE SERPL-MCNC: 95 MG/DL
POTASSIUM SERPL-SCNC: 4.4 MMOL/L
PROT SERPL-MCNC: 6.1 G/DL
SODIUM SERPL-SCNC: 138 MMOL/L

## 2024-04-13 LAB — BILE AC SER-MCNC: 1 UMOL/L

## 2024-04-19 NOTE — OB RN PATIENT PROFILE - BREASTFEEDING PROVIDES MATERNAL HEALTH BENEFITS, DECREASED PREMENOPAUSAL BREAST CANCER, OVARIAN CANCER AND TYPE II DIABETES MELLITUS
Please follow up with your PCP in 2 days. Take meds as prescribed. Keep wound clean and dry.     You may take tylenol 650mg every 6 hours as needed for pain. Do not exceed 3500mg in 24 hours.  You may take 400-600mg ibuprofen every 6 hours as needed.    If needed, you can alternate these medications so that you take one medication every 3 hours.   For instance, at noon take ibuprofen, then at 3pm take tylenol, then at 6pm take ibuprofen again.    
The patient is a 24y Male complaining of syncope.
Statement Selected

## 2024-04-22 ENCOUNTER — APPOINTMENT (OUTPATIENT)
Dept: ANTEPARTUM | Facility: CLINIC | Age: 34
End: 2024-04-22
Payer: MEDICAID

## 2024-04-22 ENCOUNTER — APPOINTMENT (OUTPATIENT)
Dept: OBGYN | Facility: CLINIC | Age: 34
End: 2024-04-22
Payer: MEDICAID

## 2024-04-22 ENCOUNTER — ASOB RESULT (OUTPATIENT)
Age: 34
End: 2024-04-22

## 2024-04-22 VITALS
WEIGHT: 173 LBS | DIASTOLIC BLOOD PRESSURE: 72 MMHG | SYSTOLIC BLOOD PRESSURE: 113 MMHG | HEIGHT: 63 IN | BODY MASS INDEX: 30.65 KG/M2

## 2024-04-22 PROCEDURE — 76811 OB US DETAILED SNGL FETUS: CPT

## 2024-04-22 PROCEDURE — 99212 OFFICE O/P EST SF 10 MIN: CPT | Mod: TH

## 2024-04-22 PROCEDURE — 76817 TRANSVAGINAL US OBSTETRIC: CPT | Mod: 59

## 2024-04-24 RX ORDER — BLOOD SUGAR DIAGNOSTIC
STRIP MISCELLANEOUS
Qty: 2 | Refills: 4 | Status: ACTIVE | COMMUNITY
Start: 2024-04-24 | End: 1900-01-01

## 2024-04-24 RX ORDER — ISOPROPYL ALCOHOL 0.7 ML/ML
SWAB TOPICAL
Qty: 2 | Refills: 4 | Status: ACTIVE | COMMUNITY
Start: 2024-04-24 | End: 1900-01-01

## 2024-04-24 RX ORDER — LANCETS
EACH MISCELLANEOUS
Qty: 2 | Refills: 4 | Status: ACTIVE | COMMUNITY
Start: 2024-04-24 | End: 1900-01-01

## 2024-04-24 RX ORDER — BLOOD-GLUCOSE METER
W/DEVICE KIT MISCELLANEOUS
Qty: 1 | Refills: 0 | Status: ACTIVE | COMMUNITY
Start: 2024-04-24 | End: 1900-01-01

## 2024-04-30 ENCOUNTER — APPOINTMENT (OUTPATIENT)
Dept: OBGYN | Facility: CLINIC | Age: 34
End: 2024-04-30
Payer: MEDICAID

## 2024-04-30 VITALS — SYSTOLIC BLOOD PRESSURE: 112 MMHG | WEIGHT: 172 LBS | BODY MASS INDEX: 30.47 KG/M2 | DIASTOLIC BLOOD PRESSURE: 72 MMHG

## 2024-04-30 PROCEDURE — 99212 OFFICE O/P EST SF 10 MIN: CPT | Mod: TH

## 2024-05-06 ENCOUNTER — APPOINTMENT (OUTPATIENT)
Dept: OBGYN | Facility: CLINIC | Age: 34
End: 2024-05-06
Payer: MEDICAID

## 2024-05-06 ENCOUNTER — APPOINTMENT (OUTPATIENT)
Dept: ANTEPARTUM | Facility: CLINIC | Age: 34
End: 2024-05-06
Payer: MEDICAID

## 2024-05-06 ENCOUNTER — ASOB RESULT (OUTPATIENT)
Age: 34
End: 2024-05-06

## 2024-05-06 VITALS
DIASTOLIC BLOOD PRESSURE: 74 MMHG | SYSTOLIC BLOOD PRESSURE: 112 MMHG | WEIGHT: 178 LBS | HEIGHT: 63 IN | BODY MASS INDEX: 31.54 KG/M2

## 2024-05-06 PROCEDURE — 76816 OB US FOLLOW-UP PER FETUS: CPT

## 2024-05-06 PROCEDURE — 76817 TRANSVAGINAL US OBSTETRIC: CPT

## 2024-05-06 PROCEDURE — 99212 OFFICE O/P EST SF 10 MIN: CPT | Mod: TH

## 2024-05-13 ENCOUNTER — APPOINTMENT (OUTPATIENT)
Dept: OBGYN | Facility: CLINIC | Age: 34
End: 2024-05-13
Payer: MEDICAID

## 2024-05-13 VITALS
SYSTOLIC BLOOD PRESSURE: 114 MMHG | HEIGHT: 63 IN | DIASTOLIC BLOOD PRESSURE: 74 MMHG | WEIGHT: 175 LBS | BODY MASS INDEX: 31.01 KG/M2

## 2024-05-13 PROCEDURE — 99212 OFFICE O/P EST SF 10 MIN: CPT | Mod: TH

## 2024-05-13 RX ORDER — PRENATAL VIT NO.126/IRON/FOLIC 28MG-0.8MG
28-0.8 TABLET ORAL DAILY
Qty: 30 | Refills: 8 | Status: ACTIVE | COMMUNITY
Start: 2024-05-13 | End: 1900-01-01

## 2024-05-13 RX ORDER — ISOPROPYL ALCOHOL 0.7 ML/ML
SWAB TOPICAL
Qty: 3 | Refills: 1 | Status: ACTIVE | COMMUNITY
Start: 2024-05-13 | End: 1900-01-01

## 2024-05-13 RX ORDER — ELECTROLYTES/DEXTROSE
32G X 4 MM SOLUTION, ORAL ORAL
Qty: 1 | Refills: 4 | Status: ACTIVE | COMMUNITY
Start: 2024-05-13 | End: 1900-01-01

## 2024-05-13 RX ORDER — INSULIN GLARGINE 100 [IU]/ML
100 INJECTION, SOLUTION SUBCUTANEOUS
Qty: 2 | Refills: 3 | Status: ACTIVE | COMMUNITY
Start: 2024-05-13 | End: 1900-01-01

## 2024-05-14 ENCOUNTER — APPOINTMENT (OUTPATIENT)
Dept: ANTEPARTUM | Facility: CLINIC | Age: 34
End: 2024-05-14

## 2024-05-14 ENCOUNTER — APPOINTMENT (OUTPATIENT)
Dept: OBGYN | Facility: CLINIC | Age: 34
End: 2024-05-14

## 2024-05-16 ENCOUNTER — NON-APPOINTMENT (OUTPATIENT)
Age: 34
End: 2024-05-16

## 2024-05-22 ENCOUNTER — APPOINTMENT (OUTPATIENT)
Dept: OBGYN | Facility: CLINIC | Age: 34
End: 2024-05-22
Payer: MEDICAID

## 2024-05-22 ENCOUNTER — NON-APPOINTMENT (OUTPATIENT)
Age: 34
End: 2024-05-22

## 2024-05-22 PROCEDURE — ZZZZZ: CPT

## 2024-05-29 ENCOUNTER — APPOINTMENT (OUTPATIENT)
Dept: ANTEPARTUM | Facility: CLINIC | Age: 34
End: 2024-05-29
Payer: MEDICAID

## 2024-05-29 ENCOUNTER — APPOINTMENT (OUTPATIENT)
Dept: OBGYN | Facility: CLINIC | Age: 34
End: 2024-05-29
Payer: MEDICAID

## 2024-05-29 ENCOUNTER — ASOB RESULT (OUTPATIENT)
Age: 34
End: 2024-05-29

## 2024-05-29 VITALS
WEIGHT: 179 LBS | SYSTOLIC BLOOD PRESSURE: 116 MMHG | DIASTOLIC BLOOD PRESSURE: 76 MMHG | BODY MASS INDEX: 31.71 KG/M2 | HEIGHT: 63 IN

## 2024-05-29 DIAGNOSIS — O24.419 GESTATIONAL DIABETES MELLITUS IN PREGNANCY, UNSPECIFIED CONTROL: ICD-10-CM

## 2024-05-29 PROCEDURE — 76816 OB US FOLLOW-UP PER FETUS: CPT

## 2024-05-29 PROCEDURE — 99212 OFFICE O/P EST SF 10 MIN: CPT | Mod: TH

## 2024-05-29 RX ORDER — ELECTROLYTES/DEXTROSE
32G X 4 MM SOLUTION, ORAL ORAL
Qty: 1 | Refills: 3 | Status: ACTIVE | COMMUNITY
Start: 2024-05-29 | End: 1900-01-01

## 2024-05-29 RX ORDER — INSULIN LISPRO 100 [IU]/ML
100 INJECTION, SOLUTION INTRAVENOUS; SUBCUTANEOUS
Qty: 1 | Refills: 3 | Status: ACTIVE | COMMUNITY
Start: 2024-05-29 | End: 1900-01-01

## 2024-06-09 ENCOUNTER — OUTPATIENT (OUTPATIENT)
Dept: INPATIENT UNIT | Facility: HOSPITAL | Age: 34
LOS: 1 days | Discharge: HOME | End: 2024-06-09
Payer: MEDICAID

## 2024-06-09 ENCOUNTER — EMERGENCY (EMERGENCY)
Facility: HOSPITAL | Age: 34
LOS: 1 days | Discharge: HOME | End: 2024-06-09
Admitting: STUDENT IN AN ORGANIZED HEALTH CARE EDUCATION/TRAINING PROGRAM
Payer: MEDICAID

## 2024-06-09 ENCOUNTER — APPOINTMENT (OUTPATIENT)
Dept: ANTEPARTUM | Facility: CLINIC | Age: 34
End: 2024-06-09

## 2024-06-09 VITALS
DIASTOLIC BLOOD PRESSURE: 69 MMHG | WEIGHT: 177.91 LBS | TEMPERATURE: 98 F | SYSTOLIC BLOOD PRESSURE: 105 MMHG | OXYGEN SATURATION: 99 % | HEIGHT: 63 IN | HEART RATE: 96 BPM | RESPIRATION RATE: 16 BRPM

## 2024-06-09 VITALS
OXYGEN SATURATION: 98 % | SYSTOLIC BLOOD PRESSURE: 90 MMHG | HEART RATE: 89 BPM | DIASTOLIC BLOOD PRESSURE: 55 MMHG | RESPIRATION RATE: 16 BRPM | TEMPERATURE: 98 F

## 2024-06-09 VITALS
RESPIRATION RATE: 16 BRPM | HEART RATE: 90 BPM | DIASTOLIC BLOOD PRESSURE: 69 MMHG | SYSTOLIC BLOOD PRESSURE: 108 MMHG | TEMPERATURE: 98 F

## 2024-06-09 DIAGNOSIS — O26.899 OTHER SPECIFIED PREGNANCY RELATED CONDITIONS, UNSPECIFIED TRIMESTER: ICD-10-CM

## 2024-06-09 LAB
APPEARANCE UR: ABNORMAL
BACTERIA # UR AUTO: ABNORMAL /HPF
BILIRUB UR-MCNC: NEGATIVE — SIGNIFICANT CHANGE UP
CAST: 2 /LPF — SIGNIFICANT CHANGE UP (ref 0–4)
COLOR SPEC: YELLOW — SIGNIFICANT CHANGE UP
DIFF PNL FLD: NEGATIVE — SIGNIFICANT CHANGE UP
GLUCOSE UR QL: NEGATIVE MG/DL — SIGNIFICANT CHANGE UP
KETONES UR-MCNC: NEGATIVE MG/DL — SIGNIFICANT CHANGE UP
LEUKOCYTE ESTERASE UR-ACNC: ABNORMAL
NITRITE UR-MCNC: NEGATIVE — SIGNIFICANT CHANGE UP
PH UR: 6.5 — SIGNIFICANT CHANGE UP (ref 5–8)
PROT UR-MCNC: NEGATIVE MG/DL — SIGNIFICANT CHANGE UP
RBC CASTS # UR COMP ASSIST: 0 /HPF — SIGNIFICANT CHANGE UP (ref 0–4)
REVIEW: SIGNIFICANT CHANGE UP
SP GR SPEC: 1.01 — SIGNIFICANT CHANGE UP (ref 1–1.03)
SQUAMOUS # UR AUTO: 6 /HPF — HIGH (ref 0–5)
UROBILINOGEN FLD QL: 0.2 MG/DL — SIGNIFICANT CHANGE UP (ref 0.2–1)
WBC UR QL: 25 /HPF — HIGH (ref 0–5)
YEAST-LIKE CELLS: PRESENT

## 2024-06-09 PROCEDURE — 99284 EMERGENCY DEPT VISIT MOD MDM: CPT

## 2024-06-09 PROCEDURE — 59025 FETAL NON-STRESS TEST: CPT | Mod: 26

## 2024-06-09 PROCEDURE — 99212 OFFICE O/P EST SF 10 MIN: CPT | Mod: 25

## 2024-06-09 PROCEDURE — 83986 ASSAY PH BODY FLUID NOS: CPT | Mod: QW

## 2024-06-09 RX ADMIN — Medication 1 TABLET(S): at 22:43

## 2024-06-09 RX ADMIN — Medication 1 APPLICATORFUL: at 21:45

## 2024-06-09 NOTE — ED PROVIDER NOTE - PATIENT PORTAL LINK FT
You can access the FollowMyHealth Patient Portal offered by [facility] by registering at the following website: [email or web address]. By joining [employer]’s FollowMyHealth portal, you will also be able to view your health information using other applications (apps) compatible with our system.

## 2024-06-09 NOTE — ED ADULT NURSE NOTE - OBJECTIVE STATEMENT
received pt to intake1. A&OX4 RR even unlabored completing full clear sentences. pt is 28 weeks pregnant, c/o vaginal discharge and itching x2 days. pt denies abd pain/vaginal bleeding. pt states discharge is white in color. on assessment pt well appearing. denies any other acute sx. urine collected and sent as ordered. stretcher lowest position, siderail up, safety measures maintained. pending medication from pharmacy at this time

## 2024-06-09 NOTE — OB RN TRIAGE NOTE - FALL HARM RISK - UNIVERSAL INTERVENTIONS
Bed in lowest position, wheels locked, appropriate side rails in place/Call bell, personal items and telephone in reach/Instruct patient to call for assistance before getting out of bed or chair/Non-slip footwear when patient is out of bed/[place] to call system/Physically safe environment - no spills, clutter or unnecessary equipment/Purposeful Proactive Rounding/Room/bathroom lighting operational, light cord in reach

## 2024-06-09 NOTE — ED PROVIDER NOTE - NS_EDPROVIDERDISPOUSERTYPE_ED_A_ED
I have personally evaluated and examined the patient. The Attending was available to me as a supervising provider if needed. Parent(s)

## 2024-06-09 NOTE — ED PROVIDER NOTE - NSFOLLOWUPINSTRUCTIONS_ED_ALL_ED_FT
Vaginal Yeast Infection, Adult  Female body with a close-up showing the vagina with a yeast infection.  Vaginal yeast infection is a condition that causes vaginal discharge as well as soreness, swelling, and redness (inflammation) of the vagina. This is a common condition. Some women get this infection frequently.    What are the causes?  This condition is caused by a change in the normal balance of the yeast (Candida) and normal bacteria that live in the vagina. This change causes an overgrowth of yeast, which causes the inflammation.    What increases the risk?  The condition is more likely to develop in women who:  Take antibiotic medicines.  Have diabetes.  Take birth control pills.  Are pregnant.  Douche often.  Have a weak body defense system (immune system).  Have been taking steroid medicines for a long time.  Frequently wear tight clothing.    What are the signs or symptoms?  Symptoms of this condition include:  White, thick, creamy vaginal discharge.  Swelling, itching, redness, and irritation of the vagina. The lips of the vagina (labia) may be affected as well.  Pain or a burning feeling while urinating.  Pain during sex.  How is this diagnosed?  This condition is diagnosed based on:  Your medical history.  A physical exam.  A pelvic exam. Your health care provider will examine a sample of your vaginal discharge under a microscope. Your health care provider may send this sample for testing to confirm the diagnosis.    How is this treated?  This condition is treated with medicine. Medicines may be over-the-counter or prescription. You may be told to use one or more of the following:  Medicine that is taken by mouth (orally).  Medicine that is applied as a cream (topically).  Medicine that is inserted directly into the vagina (suppository).  Follow these instructions at home:  Take or apply over-the-counter and prescription medicines only as told by your health care provider.  Do not use tampons until your health care provider approves.  Do not have sex until your infection has cleared. Sex can prolong or worsen your symptoms of infection. Ask your health care provider when it is safe to resume sexual activity.  Keep all follow-up visits. This is important.    How is this prevented?  A sign showing that a person should not douche.  Do not wear tight clothes, such as pantyhose or tight pants.  Wear breathable cotton underwear.  Do not use douches, perfumed soap, creams, or powders.  Wipe from front to back after using the toilet.  If you have diabetes, keep your blood sugar levels under control.  Ask your health care provider for other ways to prevent yeast infections.    Contact a health care provider if:  You have a fever.  Your symptoms go away and then return.  Your symptoms do not get better with treatment.  Your symptoms get worse.  You have new symptoms.  You develop blisters in or around your vagina.  You have blood coming from your vagina and it is not your menstrual period.  You develop pain in your abdomen.    Summary  Vaginal yeast infection is a condition that causes discharge as well as soreness, swelling, and redness (inflammation) of the vagina.  This condition is treated with medicine. Medicines may be over-the-counter or prescription.  Take or apply over-the-counter and prescription medicines only as told by your health care provider.  Do not douche. Resume sexual activity or use of tampons as instructed by your health care provider.  Contact a health care provider if your symptoms do not get better with treatment or your symptoms go away and then return.  This information is not intended to replace advice given to you by your health care provider. Make sure you discuss any questions you have with your health care provider.

## 2024-06-09 NOTE — ED PROVIDER NOTE - CLINICAL SUMMARY MEDICAL DECISION MAKING FREE TEXT BOX
33 y/o F with no PMH, about 28 weeks pregnant, , presents c/o vaginal itching with white discharge since yesterday. Exam and history are consistent with vaginal yeast infection. Will prescribe clotrimazole 1% cream for her to start now and a prescription to be sent to her preferred pharmacy. Will send a UA to r/o any other infections. Will consult ob/gyn to evaluate the pregnancy. Will recommend follow up with ob/gyn within the week to make sure infection is improving. Patient comfortable and stable for discharge with pcp and ob/gyn follow up. Instructed to return to the ED immediately for any worsening symptoms or new concerns. 35 y/o F with no PMH, about 28 weeks pregnant, , presents c/o vaginal itching with white discharge since yesterday. Exam and history are consistent with vaginal yeast infection. Will prescribe clotrimazole 1% cream for her to start now and a prescription to be sent to her preferred pharmacy. Will send a UA to r/o any other infections. UA came back showing moderate bacteria in urine so will treat for asymptomatic bacteria with augmentin. First dose provided in ED. Patient will be transferred to ob/gyn triage after discharge for NST. Will recommend follow up with ob/gyn within the week to make sure infection is improving. Patient comfortable and stable for discharge with pcp and ob/gyn follow up. Instructed to return to the ED immediately for any worsening symptoms or new concerns.

## 2024-06-09 NOTE — ED PROVIDER NOTE - PHYSICAL EXAMINATION
CONSTITUTIONAL: Comfortable; in no acute distress. Non-toxic appearing.   NEURO: Alert & oriented. Sensory and motor functions are grossly intact.  PSYCH: Mood appropriate. Thought processes intact.   HEAD: NCAT    ABD: Soft; (+) gravid abdomen; non-distended; non-tender. No guarding or rebound.   : Normal external genitalia; (+) white curd like discharge throughout the vaginal vault; no bleeding; cervical os closed; no cmt.  MUSCULOSKELETAL/EXTREMITIES: FROM in all four extremities; no extremity edema.  SKIN: Warm; dry; no apparent lesions or exudate

## 2024-06-09 NOTE — ED PROVIDER NOTE - OBJECTIVE STATEMENT
35 y/o F with no PMH, about 28 weeks pregnant, , presents c/o vaginal itching with white discharge since yesterday. Patient notes that her symptoms are consistent with when she was diagnosed with a yeast infection in the past. She spoke to her gyn regarding her symptoms who told her to come to the ED since according to the patient she had a miscarriage at 22 weeks during her previous pregnancy as the result of a yeast infection. Patient otherwise currently denies any other complaints or concerns. Denies vaginal bleeding, pelvic pain, chest pain, SOB, cough, fevers, chills, abdominal pain, N/V/D/C, urinary complaints, HA, dizziness, numbness, tingling, weakness, trauma, injuries, falls, sick contacts, recent travel.

## 2024-06-09 NOTE — ED ADULT NURSE NOTE - CHIEF COMPLAINT QUOTE
pt is 28 weeks pregnant, c/o vaginal discharge and itching x2 days. pt denies abd pain/vaginal bleeding.

## 2024-06-10 ENCOUNTER — APPOINTMENT (OUTPATIENT)
Dept: OBGYN | Facility: CLINIC | Age: 34
End: 2024-06-10
Payer: MEDICAID

## 2024-06-10 VITALS
WEIGHT: 180 LBS | SYSTOLIC BLOOD PRESSURE: 122 MMHG | DIASTOLIC BLOOD PRESSURE: 76 MMHG | BODY MASS INDEX: 31.89 KG/M2 | HEIGHT: 63 IN

## 2024-06-10 VITALS — SYSTOLIC BLOOD PRESSURE: 109 MMHG | HEART RATE: 88 BPM | DIASTOLIC BLOOD PRESSURE: 56 MMHG

## 2024-06-10 LAB
APPEARANCE UR: ABNORMAL
BACTERIA # UR AUTO: ABNORMAL /HPF
BILIRUB UR-MCNC: NEGATIVE — SIGNIFICANT CHANGE UP
CAST: 2 /LPF — SIGNIFICANT CHANGE UP (ref 0–4)
COLOR SPEC: YELLOW — SIGNIFICANT CHANGE UP
DIFF PNL FLD: NEGATIVE — SIGNIFICANT CHANGE UP
GLUCOSE UR QL: NEGATIVE MG/DL — SIGNIFICANT CHANGE UP
KETONES UR-MCNC: 40 MG/DL
LEUKOCYTE ESTERASE UR-ACNC: ABNORMAL
NITRITE UR-MCNC: NEGATIVE — SIGNIFICANT CHANGE UP
PH UR: 6.5 — SIGNIFICANT CHANGE UP (ref 5–8)
PROT UR-MCNC: SIGNIFICANT CHANGE UP MG/DL
RBC CASTS # UR COMP ASSIST: 3 /HPF — SIGNIFICANT CHANGE UP (ref 0–4)
REVIEW: SIGNIFICANT CHANGE UP
SP GR SPEC: 1.02 — SIGNIFICANT CHANGE UP (ref 1–1.03)
SQUAMOUS # UR AUTO: 26 /HPF — HIGH (ref 0–5)
UROBILINOGEN FLD QL: 0.2 MG/DL — SIGNIFICANT CHANGE UP (ref 0.2–1)
WBC UR QL: 31 /HPF — HIGH (ref 0–5)

## 2024-06-10 PROCEDURE — 99212 OFFICE O/P EST SF 10 MIN: CPT | Mod: TH

## 2024-06-10 NOTE — OB PROVIDER TRIAGE NOTE - ADDITIONAL INSTRUCTIONS
follow up with OB provider as scheduled today 6/10/24.  increase fluid hydration.  reviewed fetal kick count and labor precautions  call OB/return to triage with change in symptoms and or concerns such as decreased fetal movement, leakage of fluid, vaginal bleeding, contractions, pain, fever, headache, blurry vision.

## 2024-06-10 NOTE — OB PROVIDER TRIAGE NOTE - NSOBPROVIDERNOTE_OBGYN_ALL_OB_FT
34 year old female  @ 27.1GA with SLIUP, PNC complicated by GDMA2, presents to triage c/o yeast infection  d/w Dr Bach: perform NST, TAS, VE. not to perform SSE, TVS, nitrazine/ferning/amnisure.   limited sonogram reassuring - GFM, MVP 6.47,   NST in progress  VE 0/0/-3    maternal/fetal surveillance reassuring  d/w Dr Bach: discharge home  follow up with OB provider as scheduled today 6/10/24.  increase fluid hydration.  reviewed fetal kick count and labor precautions  call OB/return to triage with change in symptoms and or concerns such as decreased fetal movement, leakage of fluid, vaginal bleeding, contractions, pain, fever, headache, blurry vision.  addressed questions and concerns  reviewed labor precautions and discharge papers. pt verbalized understanding and signed.    Discharged 34 year old female  @ 27.1GA with SLIUP, PNC complicated by GDMA2, presents to triage c/o yeast infection  d/w Dr Bach: perform NST, TAS, VE. not to perform SSE, TVS, nitrazine/ferning/amnisure.   limited sonogram reassuring - GFM, MVP 6.47,   NST reactive, non fili - reviewed by Dr Bach, can discontinue monitoring   VE 0/0/-3    maternal/fetal surveillance reassuring  d/w Dr Bach: discharge home  follow up with OB provider as scheduled today 6/10/24.  increase fluid hydration.  reviewed fetal kick count and labor precautions  call OB/return to triage with change in symptoms and or concerns such as decreased fetal movement, leakage of fluid, vaginal bleeding, contractions, pain, fever, headache, blurry vision.  addressed questions and concerns  reviewed labor precautions and discharge papers. pt verbalized understanding and signed.    Discharged 1:10am 34 year old female  @ 27.1GA with SLIUP, PNC complicated by GDMA2, presents to triage c/o yeast infection  d/w Dr Bach: perform NST, TAS, VE. not to perform SSE, TVS, nitrazine/ferning/amnisure.   limited sonogram reassuring - GFM, MVP 6.47,   NST reactive, non fili - reviewed by Dr Bach, can discontinue monitoring   VE 0/0/-3, intact  no evidence PPROM    maternal/fetal surveillance reassuring  d/w Dr Bach: discharge home  follow up with OB provider as scheduled today 6/10/24.  increase fluid hydration.  reviewed fetal kick count and labor precautions  call OB/return to triage with change in symptoms and or concerns such as decreased fetal movement, leakage of fluid, vaginal bleeding, contractions, pain, fever, headache, blurry vision.  addressed questions and concerns  reviewed labor precautions and discharge papers. pt verbalized understanding and signed.    Discharged 1:10am

## 2024-06-10 NOTE — OB PROVIDER TRIAGE NOTE - HISTORY OF PRESENT ILLNESS
34 year old female  @ 27.1GA with SLIUP, PNC complicated by GDMA2, presents to triage c/o yeast infection. Pt states she has white discharge, vaginal itchiness and it feels like a yeast infection shes had before. States she had a  loss due to a yeast infection. Pt presented to ED and was diagnosed with a yeast infection and given medication.   reports GFM. denies vaginal bleeding, leakage of watery fluid.   Denies fever, chills, headaches, changes in vision, chest pain, palpitations, shortness of breath, cough, nausea, vomiting, diarrhea, constipation, urinary symptoms, edema.  34 year old female  @ 27.1GA with SLIUP, PNC complicated by GDMA2, presents to triage c/o yeast infection. Pt states she has white discharge, vaginal itchiness and it feels like a yeast infection shes had before. States she had a  loss due to a yeast infection. Pt presented to ED and was diagnosed with a yeast infection and given medication, also diagnosed with asymptomatic bacteruria and prescribed augmentin.   reports GFM. denies vaginal bleeding, leakage of watery fluid.   Denies fever, chills, headaches, changes in vision, chest pain, palpitations, shortness of breath, cough, nausea, vomiting, diarrhea, constipation, urinary symptoms, edema.  34 year old female  @ 27.1GA with SLIUP, PNC complicated by GDMA2, presents to triage c/o yeast infection. Pt states she has white discharge, vaginal itchiness and it feels like a yeast infection shes had before. States she had a  loss due to a yeast infection. Pt presented to ED tonight and was diagnosed with a yeast infection and given medication, also diagnosed with asymptomatic bacteruria and prescribed augmentin. Pt cleared from ED and sent to L&D triage for NST/TAS.  reports GFM. denies vaginal bleeding, leakage of watery fluid.   Denies fever, chills, headaches, changes in vision, chest pain, palpitations, shortness of breath, cough, nausea, vomiting, diarrhea, constipation, urinary symptoms, edema.

## 2024-06-10 NOTE — OB PROVIDER TRIAGE NOTE - NSHPPHYSICALEXAM_GEN_ALL_CORE
Vital Signs Last 24 Hrs  T(C): 36.9 (10 Anand 2024 00:00), Max: 36.9 (09 Jun 2024 23:42)  T(F): 98.42 (10 Anand 2024 00:00), Max: 98.42 (10 Anand 2024 00:00)  HR: 86 (10 Anand 2024 00:00) (86 - 96)  BP: 103/56 (10 Anand 2024 00:00) (90/55 - 108/69)  BP(mean): --  RR: 16 (09 Jun 2024 23:42) (16 - 16)  SpO2: 98% (09 Jun 2024 23:01) (98% - 99%)    gen: a/o x 3, NAD  pulm: breathing unlabored on room air  abd: soft and nontender, gravid  SVE: 0/0/-3, no LOF/VB/discharge noted noted   TAS: vertex by sono. anterior/fundal placenta. FH 145bpm. GFM. MVp 6.47. image saved in ASOB  EFM: baseline 130 with mod variability, pos accels  TOCO: non fili Vital Signs Last 24 Hrs  T(C): 36.9 (10 Anand 2024 00:00), Max: 36.9 (09 Jun 2024 23:42)  T(F): 98.42 (10 Anand 2024 00:00), Max: 98.42 (10 Anand 2024 00:00)  HR: 86 (10 Anand 2024 00:00) (86 - 96)  BP: 103/56 (10 Anand 2024 00:00) (90/55 - 108/69)  BP(mean): --  RR: 16 (09 Jun 2024 23:42) (16 - 16)  SpO2: 98% (09 Jun 2024 23:01) (98% - 99%)    gen: a/o x 3, NAD  pulm: breathing unlabored on room air  abd: soft and nontender, gravid  SVE: 0/0/-3, no LOF/VB/discharge noted   TAS: vertex by sono. anterior/fundal placenta. FH 145bpm. GFM. MVp 6.47. image saved in ASOB  EFM: baseline 130 with mod variability, pos accels  TOCO: non fili

## 2024-06-10 NOTE — OB PROVIDER TRIAGE NOTE - NSHPLABSRESULTS_GEN_ALL_CORE
Urinalysis Basic - ( 10 Anand 2024 00:15 )    Color: Yellow / Appearance: Cloudy / S.017 / pH: x  Gluc: x / Ketone: 40 mg/dL  / Bili: Negative / Urobili: 0.2 mg/dL   Blood: x / Protein: Trace mg/dL / Nitrite: Negative   Leuk Esterase: Moderate / RBC: x / WBC x   Sq Epi: x / Non Sq Epi: x / Bacteria: x

## 2024-06-11 DIAGNOSIS — O09.292 SUPERVISION OF PREGNANCY WITH OTHER POOR REPRODUCTIVE OR OBSTETRIC HISTORY, SECOND TRIMESTER: ICD-10-CM

## 2024-06-11 DIAGNOSIS — N89.8 OTHER SPECIFIED NONINFLAMMATORY DISORDERS OF VAGINA: ICD-10-CM

## 2024-06-11 DIAGNOSIS — B37.32 CHRONIC CANDIDIASIS OF VULVA AND VAGINA: ICD-10-CM

## 2024-06-11 DIAGNOSIS — Z3A.27 27 WEEKS GESTATION OF PREGNANCY: ICD-10-CM

## 2024-06-11 DIAGNOSIS — O24.415 GESTATIONAL DIABETES MELLITUS IN PREGNANCY, CONTROLLED BY ORAL HYPOGLYCEMIC DRUGS: ICD-10-CM

## 2024-06-11 DIAGNOSIS — O99.891 OTHER SPECIFIED DISEASES AND CONDITIONS COMPLICATING PREGNANCY: ICD-10-CM

## 2024-06-11 DIAGNOSIS — O23.592 INFECTION OF OTHER PART OF GENITAL TRACT IN PREGNANCY, SECOND TRIMESTER: ICD-10-CM

## 2024-06-11 LAB
CULTURE RESULTS: SIGNIFICANT CHANGE UP
SPECIMEN SOURCE: SIGNIFICANT CHANGE UP

## 2024-06-15 NOTE — HISTORY OF PRESENT ILLNESS
[FreeTextEntry1] : Pt asked for  to be utilized as [facility] . Offered  service, pt declined.\par \par Pt is a 30yo  LMP 3/15 presenting today for consult regarding inferility & h/o PPROM w/ fetal demise. MFM was consulted last year and pt had telehealth appointment with Dr. Mccabe & Lorena regarding use of vaginal progesterone & cervical length testing in next pregnancy. Pt was referred to [facility] twice for pt concern of infertility, pt has not yet seen [facility]. Pt has been trying to conceive since 2020, is tracking ovulation & having timed intercourse. Explained to patient that she has 12 months of attempting conception before infertility work-up is usually recommended but that pt can see [facility] specialist for guidance. Explained to patient that MFM will follow her during her pregnancy when she does conceive.\par \par OBHx: FT   ([facility]), [facility] PPROM@20.1wks w/ FD  (placental chorioamnionitis)\par GynHx: has regular periods, 28 day cycles, regular ovulation noted, no h/o fibroids, abnormal cervical exams, STIs\par \par PMH:pt reports hypothyroidism however has normal TSH and free t4 on 25 mcg synthroid.\par PSH: none\par FMH: denies h/o coagulopathies \par Meds: levothyroxine 25mcg QD\par Allergies: NKDA\par Social: no toxic habits noted\par Psych: denies anxiety/depression \par \par 
left foot +5th toe erythema, +TTP, no fluctuance, no open wound, +serous drainage from toenail, no pus

## 2024-06-24 ENCOUNTER — APPOINTMENT (OUTPATIENT)
Dept: ANTEPARTUM | Facility: CLINIC | Age: 34
End: 2024-06-24
Payer: MEDICAID

## 2024-06-24 ENCOUNTER — ASOB RESULT (OUTPATIENT)
Age: 34
End: 2024-06-24

## 2024-06-24 ENCOUNTER — APPOINTMENT (OUTPATIENT)
Dept: OBGYN | Facility: CLINIC | Age: 34
End: 2024-06-24
Payer: MEDICAID

## 2024-06-24 VITALS — DIASTOLIC BLOOD PRESSURE: 71 MMHG | WEIGHT: 182 LBS | SYSTOLIC BLOOD PRESSURE: 109 MMHG | BODY MASS INDEX: 32.24 KG/M2

## 2024-06-24 DIAGNOSIS — Z34.90 ENCOUNTER FOR SUPERVISION OF NORMAL PREGNANCY, UNSPECIFIED, UNSPECIFIED TRIMESTER: ICD-10-CM

## 2024-06-24 PROCEDURE — 76819 FETAL BIOPHYS PROFIL W/O NST: CPT | Mod: 59

## 2024-06-24 PROCEDURE — 76816 OB US FOLLOW-UP PER FETUS: CPT

## 2024-06-24 PROCEDURE — 99212 OFFICE O/P EST SF 10 MIN: CPT | Mod: TH

## 2024-06-24 RX ORDER — TERCONAZOLE 4 MG/G
0.4 CREAM VAGINAL
Qty: 1 | Refills: 0 | Status: ACTIVE | COMMUNITY
Start: 2024-06-24 | End: 1900-01-01

## 2024-07-10 ENCOUNTER — APPOINTMENT (OUTPATIENT)
Dept: OBGYN | Facility: CLINIC | Age: 34
End: 2024-07-10
Payer: MEDICAID

## 2024-07-10 VITALS
WEIGHT: 185 LBS | DIASTOLIC BLOOD PRESSURE: 75 MMHG | HEIGHT: 63 IN | BODY MASS INDEX: 32.78 KG/M2 | SYSTOLIC BLOOD PRESSURE: 107 MMHG

## 2024-07-10 DIAGNOSIS — Z34.90 ENCOUNTER FOR SUPERVISION OF NORMAL PREGNANCY, UNSPECIFIED, UNSPECIFIED TRIMESTER: ICD-10-CM

## 2024-07-10 PROCEDURE — 99212 OFFICE O/P EST SF 10 MIN: CPT | Mod: TH

## 2024-07-10 RX ORDER — FAMOTIDINE 20 MG/1
20 TABLET, FILM COATED ORAL
Qty: 1 | Refills: 0 | Status: ACTIVE | COMMUNITY
Start: 2024-07-10 | End: 1900-01-01

## 2024-07-10 RX ORDER — INSULIN LISPRO 100 [IU]/ML
100 INJECTION, SOLUTION INTRAVENOUS; SUBCUTANEOUS
Qty: 1 | Refills: 3 | Status: ACTIVE | COMMUNITY
Start: 2024-07-10 | End: 1900-01-01

## 2024-07-17 ENCOUNTER — ASOB RESULT (OUTPATIENT)
Age: 34
End: 2024-07-17

## 2024-07-17 ENCOUNTER — APPOINTMENT (OUTPATIENT)
Dept: ANTEPARTUM | Facility: CLINIC | Age: 34
End: 2024-07-17
Payer: MEDICAID

## 2024-07-17 ENCOUNTER — OUTPATIENT (OUTPATIENT)
Dept: OUTPATIENT SERVICES | Facility: HOSPITAL | Age: 34
LOS: 1 days | Discharge: HOME | End: 2024-07-17
Payer: MEDICAID

## 2024-07-17 VITALS
HEART RATE: 98 BPM | RESPIRATION RATE: 16 BRPM | SYSTOLIC BLOOD PRESSURE: 109 MMHG | TEMPERATURE: 98 F | DIASTOLIC BLOOD PRESSURE: 68 MMHG

## 2024-07-17 VITALS — SYSTOLIC BLOOD PRESSURE: 103 MMHG | HEART RATE: 85 BPM | DIASTOLIC BLOOD PRESSURE: 61 MMHG

## 2024-07-17 DIAGNOSIS — O26.899 OTHER SPECIFIED PREGNANCY RELATED CONDITIONS, UNSPECIFIED TRIMESTER: ICD-10-CM

## 2024-07-17 LAB
APPEARANCE UR: ABNORMAL
BACTERIA # UR AUTO: ABNORMAL /HPF
BILIRUB UR-MCNC: NEGATIVE — SIGNIFICANT CHANGE UP
CAST: 9 /LPF — HIGH (ref 0–4)
COLOR SPEC: YELLOW — SIGNIFICANT CHANGE UP
DIFF PNL FLD: ABNORMAL
GLUCOSE UR QL: NEGATIVE MG/DL — SIGNIFICANT CHANGE UP
KETONES UR-MCNC: NEGATIVE MG/DL — SIGNIFICANT CHANGE UP
LEUKOCYTE ESTERASE UR-ACNC: ABNORMAL
NITRITE UR-MCNC: NEGATIVE — SIGNIFICANT CHANGE UP
PH UR: 6.5 — SIGNIFICANT CHANGE UP (ref 5–8)
PROT UR-MCNC: 100 MG/DL
RBC CASTS # UR COMP ASSIST: 59 /HPF — HIGH (ref 0–4)
REVIEW: SIGNIFICANT CHANGE UP
SP GR SPEC: 1.02 — SIGNIFICANT CHANGE UP (ref 1–1.03)
SQUAMOUS # UR AUTO: 29 /HPF — HIGH (ref 0–5)
UROBILINOGEN FLD QL: 1 MG/DL — SIGNIFICANT CHANGE UP (ref 0.2–1)
WBC UR QL: 657 /HPF — HIGH (ref 0–5)

## 2024-07-17 PROCEDURE — 59025 FETAL NON-STRESS TEST: CPT | Mod: 26

## 2024-07-17 PROCEDURE — 76819 FETAL BIOPHYS PROFIL W/O NST: CPT | Mod: 26

## 2024-07-17 PROCEDURE — 76815 OB US LIMITED FETUS(S): CPT | Mod: 26

## 2024-07-17 PROCEDURE — 83986 ASSAY PH BODY FLUID NOS: CPT | Mod: QW

## 2024-07-17 PROCEDURE — 99222 1ST HOSP IP/OBS MODERATE 55: CPT | Mod: 25

## 2024-07-17 RX ORDER — TERCONAZOLE 4 MG/G
1 CREAM VAGINAL
Qty: 1 | Refills: 0
Start: 2024-07-17 | End: 2024-07-19

## 2024-07-17 RX ORDER — INSULIN GLARGINE 100 [IU]/ML
28 INJECTION, SOLUTION SUBCUTANEOUS
Refills: 0 | DISCHARGE

## 2024-07-17 RX ORDER — INSULIN NPH HUMAN ISOPHANE 100/ML
0 VIAL (ML) SUBCUTANEOUS
Refills: 0 | DISCHARGE

## 2024-07-17 NOTE — OB PROVIDER TRIAGE NOTE - NSOBPROVIDERNOTE_OBGYN_ALL_OB_FT
Plan D/W Dr. Em, no evidence of ROM  Vaginal yeast infection  Follow up as scheduled. Plan D/W Dr. Em, no evidence of ROM  Vaginal yeast infection  Follow up as scheduled.  Rx for Terazole sent to pharmacy  Follow up with urine culture in 48 hours [phone]  Increase fluid intake

## 2024-07-17 NOTE — OB PROVIDER TRIAGE NOTE - NSHPPHYSICALEXAM_GEN_ALL_CORE
Assessment reveals VSS, abdomen soft, NT, gravid.   BPP 8/8, KRISTOFER 18, vtx, anterior placenta-saved in asob  SSE- large amount of thick green tinged cottage cheese discharge, cervix appears closed, neg pooling, neg nitrzine, neg fern  VE 0/0/-3    Cat 1 FHT, no ctx on toco  Reactive NST   UA sent Assessment reveals VSS, abdomen soft, NT, gravid.   BPP 8/8, KRISTOFER 18, vtx, anterior placenta-saved in asob  SSE- large amount of thick green tinged cottage cheese discharge, cervix appears closed, neg pooling, neg nitrzine, neg fern  VE 0/0/-3    Cat 1 FHT, no ctx on toco  Reactive NST   UA sent    Urine culture sent

## 2024-07-17 NOTE — OB PROVIDER TRIAGE NOTE - HISTORY OF PRESENT ILLNESS
33yo  @ 32.4 presents with c/o leaking of thick white discharge since yesterday and back pain since yesterday. Reports she was treated for a yeast infection a week ago but feels like it didn't resolve. Denies dysuria, VB and reports GFM.   AP course complicated by GDMA2- Lantus 28 units QHS, Humalog with meals 10/8/10

## 2024-07-17 NOTE — OB PROVIDER TRIAGE NOTE - NSHPLABSRESULTS_GEN_ALL_CORE
Urinalysis Basic - ( 2024 18:50 )    Color: Yellow / Appearance: Turbid / S.022 / pH: x  Gluc: x / Ketone: Negative mg/dL  / Bili: Negative / Urobili: 1.0 mg/dL   Blood: x / Protein: 100 mg/dL / Nitrite: Negative   Leuk Esterase: Large / RBC: x / WBC x   Sq Epi: x / Non Sq Epi: x / Bacteria: x

## 2024-07-17 NOTE — OB PROVIDER TRIAGE NOTE - NS_CHIEFCOMPLAINT_OBGYN_ALL_OB
Additional History: Pt is here for UPT for Accutane because pt missed her 7 day window to  medication.
Possible SROM

## 2024-07-17 NOTE — OB PROVIDER TRIAGE NOTE - ADDITIONAL INSTRUCTIONS
Follow up as scheduled.  Rx for Terazole sent to pharmacy  Follow up with urine culture in 48 hours [phone]  Increase fluid intake

## 2024-07-19 LAB
CULTURE RESULTS: SIGNIFICANT CHANGE UP
SPECIMEN SOURCE: SIGNIFICANT CHANGE UP

## 2024-07-22 DIAGNOSIS — B37.9 CANDIDIASIS, UNSPECIFIED: ICD-10-CM

## 2024-07-22 DIAGNOSIS — O09.293 SUPERVISION OF PREGNANCY WITH OTHER POOR REPRODUCTIVE OR OBSTETRIC HISTORY, THIRD TRIMESTER: ICD-10-CM

## 2024-07-22 DIAGNOSIS — Z3A.32 32 WEEKS GESTATION OF PREGNANCY: ICD-10-CM

## 2024-07-22 DIAGNOSIS — O23.593 INFECTION OF OTHER PART OF GENITAL TRACT IN PREGNANCY, THIRD TRIMESTER: ICD-10-CM

## 2024-07-22 DIAGNOSIS — O24.414 GESTATIONAL DIABETES MELLITUS IN PREGNANCY, INSULIN CONTROLLED: ICD-10-CM

## 2024-07-24 ENCOUNTER — APPOINTMENT (OUTPATIENT)
Dept: OBGYN | Facility: CLINIC | Age: 34
End: 2024-07-24
Payer: MEDICAID

## 2024-07-24 ENCOUNTER — APPOINTMENT (OUTPATIENT)
Dept: ANTEPARTUM | Facility: CLINIC | Age: 34
End: 2024-07-24
Payer: MEDICAID

## 2024-07-24 ENCOUNTER — ASOB RESULT (OUTPATIENT)
Age: 34
End: 2024-07-24

## 2024-07-24 VITALS — SYSTOLIC BLOOD PRESSURE: 107 MMHG | WEIGHT: 181 LBS | DIASTOLIC BLOOD PRESSURE: 74 MMHG | BODY MASS INDEX: 32.06 KG/M2

## 2024-07-24 PROCEDURE — 76816 OB US FOLLOW-UP PER FETUS: CPT

## 2024-07-24 PROCEDURE — 76819 FETAL BIOPHYS PROFIL W/O NST: CPT | Mod: 59

## 2024-07-24 PROCEDURE — 99212 OFFICE O/P EST SF 10 MIN: CPT | Mod: TH

## 2024-07-31 ENCOUNTER — APPOINTMENT (OUTPATIENT)
Dept: OBGYN | Facility: CLINIC | Age: 34
End: 2024-07-31
Payer: MEDICAID

## 2024-07-31 ENCOUNTER — ASOB RESULT (OUTPATIENT)
Age: 34
End: 2024-07-31

## 2024-07-31 ENCOUNTER — APPOINTMENT (OUTPATIENT)
Dept: ANTEPARTUM | Facility: CLINIC | Age: 34
End: 2024-07-31
Payer: MEDICAID

## 2024-07-31 VITALS
DIASTOLIC BLOOD PRESSURE: 72 MMHG | BODY MASS INDEX: 32.43 KG/M2 | HEIGHT: 63 IN | SYSTOLIC BLOOD PRESSURE: 107 MMHG | WEIGHT: 183 LBS

## 2024-07-31 PROCEDURE — 76818 FETAL BIOPHYS PROFILE W/NST: CPT

## 2024-07-31 PROCEDURE — 99213 OFFICE O/P EST LOW 20 MIN: CPT | Mod: TH

## 2024-08-08 ENCOUNTER — APPOINTMENT (OUTPATIENT)
Dept: ANTEPARTUM | Facility: CLINIC | Age: 34
End: 2024-08-08

## 2024-08-08 ENCOUNTER — ASOB RESULT (OUTPATIENT)
Age: 34
End: 2024-08-08

## 2024-08-08 ENCOUNTER — APPOINTMENT (OUTPATIENT)
Dept: OBGYN | Facility: CLINIC | Age: 34
End: 2024-08-08

## 2024-08-08 PROCEDURE — 99212 OFFICE O/P EST SF 10 MIN: CPT | Mod: TH,25

## 2024-08-08 PROCEDURE — 76818 FETAL BIOPHYS PROFILE W/NST: CPT

## 2024-08-09 ENCOUNTER — APPOINTMENT (OUTPATIENT)
Dept: ANTEPARTUM | Facility: CLINIC | Age: 34
End: 2024-08-09

## 2024-08-09 ENCOUNTER — OUTPATIENT (OUTPATIENT)
Dept: INPATIENT UNIT | Facility: HOSPITAL | Age: 34
LOS: 1 days | Discharge: HOME | End: 2024-08-09
Payer: MEDICAID

## 2024-08-09 VITALS
SYSTOLIC BLOOD PRESSURE: 98 MMHG | TEMPERATURE: 98 F | DIASTOLIC BLOOD PRESSURE: 64 MMHG | RESPIRATION RATE: 16 BRPM | HEART RATE: 100 BPM

## 2024-08-09 VITALS — HEART RATE: 91 BPM | DIASTOLIC BLOOD PRESSURE: 55 MMHG | SYSTOLIC BLOOD PRESSURE: 92 MMHG

## 2024-08-09 DIAGNOSIS — O26.899 OTHER SPECIFIED PREGNANCY RELATED CONDITIONS, UNSPECIFIED TRIMESTER: ICD-10-CM

## 2024-08-09 LAB
AMNISURE ROM (RUPTURE OF MEMBRANES): NEGATIVE — SIGNIFICANT CHANGE UP
APPEARANCE UR: CLEAR — SIGNIFICANT CHANGE UP
BACTERIA # UR AUTO: ABNORMAL /HPF
BILIRUB UR-MCNC: NEGATIVE — SIGNIFICANT CHANGE UP
CAST: 0 /LPF — SIGNIFICANT CHANGE UP (ref 0–4)
COLOR SPEC: YELLOW — SIGNIFICANT CHANGE UP
DIFF PNL FLD: NEGATIVE — SIGNIFICANT CHANGE UP
GLUCOSE UR QL: NEGATIVE MG/DL — SIGNIFICANT CHANGE UP
KETONES UR-MCNC: NEGATIVE MG/DL — SIGNIFICANT CHANGE UP
LEUKOCYTE ESTERASE UR-ACNC: ABNORMAL
NITRITE UR-MCNC: NEGATIVE — SIGNIFICANT CHANGE UP
PH UR: 7.5 — SIGNIFICANT CHANGE UP (ref 5–8)
PROT UR-MCNC: NEGATIVE MG/DL — SIGNIFICANT CHANGE UP
RBC CASTS # UR COMP ASSIST: 0 /HPF — SIGNIFICANT CHANGE UP (ref 0–4)
SP GR SPEC: 1.01 — SIGNIFICANT CHANGE UP (ref 1–1.03)
SQUAMOUS # UR AUTO: 4 /HPF — SIGNIFICANT CHANGE UP (ref 0–5)
UROBILINOGEN FLD QL: 0.2 MG/DL — SIGNIFICANT CHANGE UP (ref 0.2–1)
WBC UR QL: 2 /HPF — SIGNIFICANT CHANGE UP (ref 0–5)

## 2024-08-09 PROCEDURE — 99221 1ST HOSP IP/OBS SF/LOW 40: CPT | Mod: 25

## 2024-08-09 PROCEDURE — 59025 FETAL NON-STRESS TEST: CPT | Mod: 26

## 2024-08-09 PROCEDURE — 76819 FETAL BIOPHYS PROFIL W/O NST: CPT | Mod: 26

## 2024-08-09 PROCEDURE — 76815 OB US LIMITED FETUS(S): CPT | Mod: 26

## 2024-08-09 PROCEDURE — 83986 ASSAY PH BODY FLUID NOS: CPT | Mod: QW

## 2024-08-09 NOTE — OB PROVIDER TRIAGE NOTE - NSHPPHYSICALEXAM_GEN_ALL_CORE
T(C): 36.9 (08-09-24 @ 14:14), Max: 36.9 (08-09-24 @ 13:53)  HR: 91 (08-09-24 @ 15:18) (91 - 109)  BP: 92/55 (08-09-24 @ 15:18) (92/55 - 103/58)  RR: 16 (08-09-24 @ 14:14) (16 - 16)    Physical Exam  Gen: NAD  Cardiac: RRR  Pulm: Unlabored, breathing comfortably on room air  Abdomen: soft, nontender, gravid    TAUS: breech, anterior placenta, KRISTOFER 13.82, BPP 8/8,  bpm via m-mode, images saved in ASOB  SSE: small amount of vaginal discharge, no pooling, nitrazine negative, fern negative, amnisure collected  VE: 0/0/-3  EFM: 135 bpm, moderate variability, +accels, no decels, reactive NST  [place]: uterine irritability

## 2024-08-09 NOTE — OB PROVIDER TRIAGE NOTE - NSOBPROVIDERNOTE_OBGYN_ALL_OB_FT
34 y.o  @ 35w6d presenting for r/o labor and r/o PPROM.    -fetal status reassuring  -ruled out for PPROM  -Breech presentation noted on sono (patient previously vertex). Dr. Gerardo made aware. Patient to  be discharged and will call OB office on Monday to schedule ECV.  -Written and verbal discharge instructions and labor precautions provided to patient.  -Discharged at 16:30    d/w Dr. Humaira RICKS-BC 34 y.o  @ 35w6d presenting for r/o labor and r/o PPROM. Pregnancy c/b GDMA2.    -fetal status reassuring  -ruled out for PPROM  -Breech presentation noted on sono (patient previously vertex). Dr. Gerardo made aware. Patient to  be discharged and will call OB office on Monday to schedule ECV.  -Written and verbal discharge instructions and labor precautions provided to patient.  -Discharged at 16:30    d/w Dr. Humaira RICKS-BC

## 2024-08-09 NOTE — OB RN TRIAGE NOTE - FALL HARM RISK - UNIVERSAL INTERVENTIONS
Bed in lowest position, wheels locked, appropriate side rails in place/Call bell, personal items and telephone in reach/Instruct patient to call for assistance before getting out of bed or chair/Non-slip footwear when patient is out of bed/[place] to call system/Physically safe environment - no spills, clutter or unnecessary equipment/Purposeful Proactive Rounding/Room/bathroom lighting operational, light cord in reach

## 2024-08-09 NOTE — OB PROVIDER TRIAGE NOTE - NSHPLABSRESULTS_GEN_ALL_CORE
Urinalysis Basic - ( 09 Aug 2024 15:18 )    Color: Yellow / Appearance: Clear / S.012 / pH: x  Gluc: x / Ketone: Negative mg/dL  / Bili: Negative / Urobili: 0.2 mg/dL   Blood: x / Protein: Negative mg/dL / Nitrite: Negative   Leuk Esterase: Large / RBC: 0 /HPF / WBC 2 /HPF   Sq Epi: x / Non Sq Epi: 4 /HPF / Bacteria: Occasional /HPF

## 2024-08-09 NOTE — OB RN TRIAGE NOTE - NS_PARA_OBGYN_ALL_OB_NU
History of Present Illness: I have reviewed and appreciated the technician's history and test results as outlined above.      Ms. Toño Ugalde a 72 year old female is here for: Diabetic Eye Health Exam. Patient reports no changes in vision at all distances. Denies flashes, and floaters. Occasional itching and pain due to eye drops.         Slit Lamp exam:      LIDS,LASHES,LACRMIAL: OD // meibomian gland dysfunction/meibomitis and dry eye OS: // meibomian gland dysfunction/meibomitis and dry eye   CONJUCTIVA OD:// clear OS:// clear   CORNEA: OD:// clear OS:// clear   ANTERIOR CHAMBER: OD:// deep and quiet OS: // deep and quiet   IRIS: OD://round and regular without neovascularization OS: // round and regular without neovascularization   LENS: OD: // 1+ nuclear sclerosis OS: // 1+ nuclear sclerosis      Posterior Segment Exam: OU      Vitreous: clear   Optic Nerve: flat, pink and healthy with +SVP   C/D ratio: OD: 0.20 // OS: 0.20   Macula: healthy with foveal reflex   Vessels: healthy with normal A-V ratio   Periphery: flat, healthy without tears or retinal detachment      DX: Refractive error.cataract   TX: Copy of MR..      Assessment: DRY EYE OU   Plan: sytane gtts prn   1

## 2024-08-09 NOTE — OB PROVIDER TRIAGE NOTE - HISTORY OF PRESENT ILLNESS
Patient/Caregiver provided printed discharge information.
34 y.o  @ 35w6d presents to hospital with c/o low back pain radiating to lower abdomen, cramping, and watery leaking of fluid since 12am. She denies vaginal bleeding, chest pain, SOB, headache, sexual intercourse. Endorses +FM.    Prenatal care with Dr. Gerardo  GDMA2: Rafi 24u @ HS; Humalog 8u w/ breakfast, 10u w/ lunch  MFM sono 24: cephalic, anterior placenta, KRISTOFER 20.57, BPP 10/10

## 2024-08-09 NOTE — OB PROVIDER TRIAGE NOTE - ADDITIONAL INSTRUCTIONS
-Follow up with Dr. Gerardo in office within 1 week.   -Call Dr. Gerardo's office on Monday 8/12/24 to schedule External Cephalic Version (ECV).  -Continue to eat a regular diet and drink plenty of fluid. Return to hospital if you experience cramping, contractions, leaking of vaginal fluid, vaginal bleeding, or a decrease/absence of your baby's movements.

## 2024-08-12 DIAGNOSIS — Z3A.35 35 WEEKS GESTATION OF PREGNANCY: ICD-10-CM

## 2024-08-12 DIAGNOSIS — R10.2 PELVIC AND PERINEAL PAIN: ICD-10-CM

## 2024-08-12 DIAGNOSIS — O26.893 OTHER SPECIFIED PREGNANCY RELATED CONDITIONS, THIRD TRIMESTER: ICD-10-CM

## 2024-08-12 DIAGNOSIS — O24.414 GESTATIONAL DIABETES MELLITUS IN PREGNANCY, INSULIN CONTROLLED: ICD-10-CM

## 2024-08-13 ENCOUNTER — APPOINTMENT (OUTPATIENT)
Dept: ANTEPARTUM | Facility: CLINIC | Age: 34
End: 2024-08-13

## 2024-08-13 ENCOUNTER — EMERGENCY (EMERGENCY)
Facility: HOSPITAL | Age: 34
LOS: 1 days | Discharge: STILL ADMITTED OR ADMINISTRATIVE | End: 2024-08-13
Admitting: EMERGENCY MEDICINE
Payer: MEDICAID

## 2024-08-13 VITALS
OXYGEN SATURATION: 99 % | DIASTOLIC BLOOD PRESSURE: 64 MMHG | RESPIRATION RATE: 18 BRPM | TEMPERATURE: 98 F | SYSTOLIC BLOOD PRESSURE: 130 MMHG | HEART RATE: 110 BPM

## 2024-08-13 PROCEDURE — L9996: CPT

## 2024-08-13 NOTE — ED ADULT TRIAGE NOTE - NSWEIGHTCALCTOOLDRUG_GEN_A_CORE
[FreeTextEntry1] : I, Jose David Gunter, documented this note as a scribe on behalf of Dr. Keenan Shin on 08/13/2024.  used

## 2024-08-13 NOTE — ED ADULT TRIAGE NOTE - CHIEF COMPLAINT QUOTE
Patient brought to ER by EMS from home for abdominal pain. MD Gerardo. 36 weeks pregnant. Due date 9/7.  . Baby moving. Spoke to Richard Apple. Patient up to Labor and delivery.

## 2024-08-14 ENCOUNTER — APPOINTMENT (OUTPATIENT)
Dept: OBGYN | Facility: CLINIC | Age: 34
End: 2024-08-14

## 2024-08-14 ENCOUNTER — APPOINTMENT (OUTPATIENT)
Dept: ANTEPARTUM | Facility: CLINIC | Age: 34
End: 2024-08-14

## 2024-08-14 VITALS
HEIGHT: 63 IN | WEIGHT: 180 LBS | BODY MASS INDEX: 31.89 KG/M2 | DIASTOLIC BLOOD PRESSURE: 71 MMHG | SYSTOLIC BLOOD PRESSURE: 111 MMHG

## 2024-08-14 VITALS — WEIGHT: 188 LBS | BODY MASS INDEX: 33.3 KG/M2 | DIASTOLIC BLOOD PRESSURE: 73 MMHG | SYSTOLIC BLOOD PRESSURE: 113 MMHG

## 2024-08-14 PROCEDURE — 76818 FETAL BIOPHYS PROFILE W/NST: CPT

## 2024-08-14 PROCEDURE — 99212 OFFICE O/P EST SF 10 MIN: CPT | Mod: TH

## 2024-08-14 RX ORDER — INSULIN LISPRO 100 [IU]/ML
100 INJECTION, SOLUTION INTRAVENOUS; SUBCUTANEOUS
Qty: 1 | Refills: 0 | Status: ACTIVE | COMMUNITY
Start: 2024-08-14 | End: 1900-01-01

## 2024-08-15 ENCOUNTER — APPOINTMENT (OUTPATIENT)
Dept: ANTEPARTUM | Facility: CLINIC | Age: 34
End: 2024-08-15

## 2024-08-15 ENCOUNTER — APPOINTMENT (OUTPATIENT)
Dept: OBGYN | Facility: CLINIC | Age: 34
End: 2024-08-15

## 2024-08-20 ENCOUNTER — OUTPATIENT (OUTPATIENT)
Dept: OUTPATIENT SERVICES | Facility: HOSPITAL | Age: 34
LOS: 1 days | Discharge: HOME | End: 2024-08-20

## 2024-08-20 ENCOUNTER — APPOINTMENT (OUTPATIENT)
Dept: ANTEPARTUM | Facility: CLINIC | Age: 34
End: 2024-08-20

## 2024-08-20 VITALS
DIASTOLIC BLOOD PRESSURE: 68 MMHG | TEMPERATURE: 99 F | RESPIRATION RATE: 16 BRPM | SYSTOLIC BLOOD PRESSURE: 106 MMHG | HEART RATE: 105 BPM | OXYGEN SATURATION: 99 %

## 2024-08-20 VITALS — SYSTOLIC BLOOD PRESSURE: 111 MMHG | DIASTOLIC BLOOD PRESSURE: 56 MMHG | HEART RATE: 99 BPM

## 2024-08-20 DIAGNOSIS — O26.899 OTHER SPECIFIED PREGNANCY RELATED CONDITIONS, UNSPECIFIED TRIMESTER: ICD-10-CM

## 2024-08-20 NOTE — OB RN TRIAGE NOTE - FALL HARM RISK - UNIVERSAL INTERVENTIONS
Bed in lowest position, wheels locked, appropriate side rails in place/Call bell, personal items and telephone in reach/Instruct patient to call for assistance before getting out of bed or chair/Non-slip footwear when patient is out of bed/[place] to call system/Physically safe environment - no spills, clutter or unnecessary equipment/Purposeful Proactive Rounding/Room/bathroom lighting operational, light cord in reach

## 2024-08-21 ENCOUNTER — APPOINTMENT (OUTPATIENT)
Dept: OBGYN | Facility: CLINIC | Age: 34
End: 2024-08-21
Payer: MEDICAID

## 2024-08-21 ENCOUNTER — APPOINTMENT (OUTPATIENT)
Dept: ANTEPARTUM | Facility: CLINIC | Age: 34
End: 2024-08-21
Payer: MEDICAID

## 2024-08-21 ENCOUNTER — ASOB RESULT (OUTPATIENT)
Age: 34
End: 2024-08-21

## 2024-08-21 VITALS
HEIGHT: 63 IN | SYSTOLIC BLOOD PRESSURE: 121 MMHG | DIASTOLIC BLOOD PRESSURE: 78 MMHG | BODY MASS INDEX: 33.31 KG/M2 | WEIGHT: 188 LBS

## 2024-08-21 PROCEDURE — 76816 OB US FOLLOW-UP PER FETUS: CPT

## 2024-08-21 PROCEDURE — 99212 OFFICE O/P EST SF 10 MIN: CPT | Mod: TH

## 2024-08-21 PROCEDURE — 76818 FETAL BIOPHYS PROFILE W/NST: CPT

## 2024-08-23 ENCOUNTER — APPOINTMENT (OUTPATIENT)
Dept: ANTEPARTUM | Facility: CLINIC | Age: 34
End: 2024-08-23

## 2024-08-23 ENCOUNTER — OUTPATIENT (OUTPATIENT)
Dept: INPATIENT UNIT | Facility: HOSPITAL | Age: 34
LOS: 1 days | Discharge: HOME | End: 2024-08-23
Payer: MEDICAID

## 2024-08-23 VITALS
SYSTOLIC BLOOD PRESSURE: 111 MMHG | TEMPERATURE: 98 F | DIASTOLIC BLOOD PRESSURE: 70 MMHG | HEART RATE: 85 BPM | RESPIRATION RATE: 16 BRPM

## 2024-08-23 DIAGNOSIS — O26.899 OTHER SPECIFIED PREGNANCY RELATED CONDITIONS, UNSPECIFIED TRIMESTER: ICD-10-CM

## 2024-08-23 PROCEDURE — 83986 ASSAY PH BODY FLUID NOS: CPT | Mod: QW

## 2024-08-23 PROCEDURE — 59025 FETAL NON-STRESS TEST: CPT | Mod: 26

## 2024-08-23 PROCEDURE — 99221 1ST HOSP IP/OBS SF/LOW 40: CPT | Mod: 25

## 2024-08-23 NOTE — OB RN TRIAGE NOTE - FALL HARM RISK - UNIVERSAL INTERVENTIONS
Bed in lowest position, wheels locked, appropriate side rails in place/Call bell, personal items and telephone in reach/Instruct patient to call for assistance before getting out of bed or chair/Non-slip footwear when patient is out of bed/[place] to call system/Physically safe environment - no spills, clutter or unnecessary equipment/Purposeful Proactive Rounding/Room/bathroom lighting operational, light cord in reach

## 2024-08-24 VITALS — SYSTOLIC BLOOD PRESSURE: 108 MMHG | HEART RATE: 91 BPM | DIASTOLIC BLOOD PRESSURE: 65 MMHG

## 2024-08-24 RX ORDER — TERCONAZOLE 80 MG
1 SUPPOSITORY, VAGINAL VAGINAL
Qty: 45 | Refills: 0
Start: 2024-08-24 | End: 2024-08-30

## 2024-08-24 NOTE — OB PROVIDER TRIAGE NOTE - NS_OBGYNHISTORY_OBGYN_ALL_OB_FT
10/8/07  FT (F) 5kg  2020: 22 week  PPROM OB History:   - FT , 10/08/07, 5000g female, uncomplicated  - PPROM @ 22 weeks with fetal demise,     GYN History: Denies history of endometriosis/uterine fibroids/cysts/abnormal pap smears/STIs

## 2024-08-24 NOTE — OB PROVIDER TRIAGE NOTE - NSOBPROVIDERNOTE_OBGYN_ALL_OB_FT
A: 33 y/o  @ 37w6d GA evaluated for possible PROM. H/o GDMA1.    P A: 33 y/o  @ 37w6d GA evaluated for possible PROM. H/o GDMA1.    P: No evidence of PROM, likely yeast vaginitis     EFM/[place], 1 variable noted, Cat I tracing for remainder of monitoring     BPP 8/8, KRISTOFER 11.72     Reassuring fetal/maternal status    Case discussed with Dr Forest Borges, PA-C A: 35 y/o  @ 37w6d GA evaluated for possible PROM. H/o GDMA1.    P: No evidence of PROM, likely yeast vaginitis discharge     Sent Terconazole to pharmacy     EFM/[place]     BPP , KRISTOFER 11.72     Reassuring fetal/maternal status     Patient stable for discharge    Discharged @ 0234:  - Patient to be discharged home with follow up and return precautions  - Please follow up with your obstetrician at your next scheduled appointment.   - Please return for decreased / no fetal movement, vaginal bleeding similar to that of a period, leaking / gush of fluid, regular contractions occurring 4-5 minutes  for one hour or requiring pain medication   - Patient educated of plan and demonstrates understanding. All questions answered. Discharge instructions provided and signed    Case discussed with Dr Forest Borges PA-C

## 2024-08-24 NOTE — OB PROVIDER TRIAGE NOTE - HISTORY OF PRESENT ILLNESS
35 y/o G*P* at **w*d GA, MARCELLA **, presenting with c/o  Endorses good fetal movement. Denies: vaginal bleeding, leakage of fluid, abdominal pain, contractions.  Patient desires epidural.    Primary OB care with **  Uncomplicated pregnancy course.  GBS: negative (**)  MFM sono from **:  EFW **   33 y/o  at 37w6d GA, MARCELLA 24, presenting with c/o intermittent leakage of fluid and cramping x24 hrs.  Patient states that leakage has not been continuous and fluid is clear and mucus-like in consistency.  States that cramping is mild, rates pain to be 5 out of 10 in severity.  Endorses good fetal movement. Denies: vaginal bleeding, contractions, nausea/vomiting.    Primary OB care with Dr Gerardo  H/o PPROM @ 22 weeks with fetal demise ()  GDMA2, on Lantus 24U QHS and Humalog 8U w breakfast and 10U w lunch  MFM sono from 24: Vertex presentation, anterior placenta, 3241g (59%), AC 79%, KRISTOFER wnl  EFW extrapolated from last MFM sono: 3300g  GBS: negative (24)   35 y/o  at 37w6d GA, MARCELLA 24, presenting with c/o intermittent leakage of fluid and cramping x24 hrs.  Patient states that leakage has not been continuous and fluid is clear and mucus-like in consistency.  States that cramping is mild, rates pain to be 5 out of 10 in severity.  Patient has been treated for recurrent yeast vaginitis multiple times throughout pregnancy.  Endorses good fetal movement. Denies: vaginal bleeding, contractions, nausea/vomiting.    Primary OB care with Dr Gerardo  H/o PPROM @ 22 weeks with fetal demise ()  GDMA2, on Lantus 24U QHS and Humalog 8U w breakfast and 10U w lunch  MFM sono from 24: Vertex presentation, anterior placenta, 3241g (59%), AC 79%, KRISTOFER wnl  EFW extrapolated from last MFM sono: 3300g  GBS: negative (24)

## 2024-08-24 NOTE — OB PROVIDER TRIAGE NOTE - NSHPPHYSICALEXAM_GEN_ALL_CORE
ICU Vital Signs Last 24 Hrs  T(C): 36.9 (23 Aug 2024 23:30), Max: 36.9 (23 Aug 2024 23:30)  T(F): 98.4 (23 Aug 2024 23:30), Max: 98.4 (23 Aug 2024 23:30)  HR: 85 (23 Aug 2024 23:48) (85 - 85)  BP: 111/70 (23 Aug 2024 23:48) (111/70 - 111/70)  RR: 16 (23 Aug 2024 23:30) (16 - 16)    General: Patient sitting comfortably in bed, A&Ox3  Abd: soft, non-tender, gravid, TOCO in place  Bedside Transabdominal Sonogram: Vertex presentation, anterior placenta, +FH noted, M mode 137 BPM, KRISTOFER 11.27, BPP 8/8, images saved in ASOB  EFM: 130 BPM baseline, moderate variability, +accels, +one spontaneous variable decel noted, category I tracing, reactive  [place]: Irregular contractions  SSE: Thick white curd-like vaginal discharge, no pooling of fluid, Nitrazine negative, ferning negative, no bleeding  SVE: 1/0/-3  Ext:  FROM bilateral no edema  Neuro: grossly intact ICU Vital Signs Last 24 Hrs  T(C): 36.9 (23 Aug 2024 23:30), Max: 36.9 (23 Aug 2024 23:30)  T(F): 98.4 (23 Aug 2024 23:30), Max: 98.4 (23 Aug 2024 23:30)  HR: 85 (23 Aug 2024 23:48) (85 - 85)  BP: 111/70 (23 Aug 2024 23:48) (111/70 - 111/70)  RR: 16 (23 Aug 2024 23:30) (16 - 16)    General: Patient sitting comfortably in bed, A&Ox3  Abd: soft, non-tender, gravid, TOCO in place  Bedside Transabdominal Sonogram: Vertex presentation, anterior placenta, +FH noted, M mode 137 BPM, KRISTOFER 11.27, BPP 8/8, images saved in ASOB  EFM: 130 BPM baseline, moderate variability, +accels, -decel, category I tracing, reactive NST  [place]: Irregular contractions  SSE: Thick white curd-like vaginal discharge, no pooling of fluid, Nitrazine negative, ferning negative, no bleeding  SVE: 1/0/-3  Ext:  FROM bilateral no edema  Neuro: grossly intact

## 2024-08-26 ENCOUNTER — OUTPATIENT (OUTPATIENT)
Dept: INPATIENT UNIT | Facility: HOSPITAL | Age: 34
LOS: 1 days | Discharge: HOME | End: 2024-08-26
Payer: MEDICAID

## 2024-08-26 ENCOUNTER — APPOINTMENT (OUTPATIENT)
Dept: ANTEPARTUM | Facility: CLINIC | Age: 34
End: 2024-08-26

## 2024-08-26 VITALS
TEMPERATURE: 98 F | RESPIRATION RATE: 16 BRPM | SYSTOLIC BLOOD PRESSURE: 103 MMHG | HEART RATE: 100 BPM | DIASTOLIC BLOOD PRESSURE: 62 MMHG

## 2024-08-26 VITALS — HEART RATE: 88 BPM | DIASTOLIC BLOOD PRESSURE: 64 MMHG | SYSTOLIC BLOOD PRESSURE: 115 MMHG

## 2024-08-26 DIAGNOSIS — O09.213 SUPERVISION OF PREGNANCY WITH HISTORY OF PRE-TERM LABOR, THIRD TRIMESTER: ICD-10-CM

## 2024-08-26 DIAGNOSIS — O26.899 OTHER SPECIFIED PREGNANCY RELATED CONDITIONS, UNSPECIFIED TRIMESTER: ICD-10-CM

## 2024-08-26 DIAGNOSIS — O24.414 GESTATIONAL DIABETES MELLITUS IN PREGNANCY, INSULIN CONTROLLED: ICD-10-CM

## 2024-08-26 DIAGNOSIS — O09.293 SUPERVISION OF PREGNANCY WITH OTHER POOR REPRODUCTIVE OR OBSTETRIC HISTORY, THIRD TRIMESTER: ICD-10-CM

## 2024-08-26 DIAGNOSIS — R10.9 UNSPECIFIED ABDOMINAL PAIN: ICD-10-CM

## 2024-08-26 DIAGNOSIS — Z3A.37 37 WEEKS GESTATION OF PREGNANCY: ICD-10-CM

## 2024-08-26 DIAGNOSIS — O26.893 OTHER SPECIFIED PREGNANCY RELATED CONDITIONS, THIRD TRIMESTER: ICD-10-CM

## 2024-08-26 DIAGNOSIS — N89.8 OTHER SPECIFIED NONINFLAMMATORY DISORDERS OF VAGINA: ICD-10-CM

## 2024-08-26 DIAGNOSIS — O99.891 OTHER SPECIFIED DISEASES AND CONDITIONS COMPLICATING PREGNANCY: ICD-10-CM

## 2024-08-26 PROCEDURE — 59025 FETAL NON-STRESS TEST: CPT | Mod: 26

## 2024-08-26 PROCEDURE — 99221 1ST HOSP IP/OBS SF/LOW 40: CPT | Mod: 25

## 2024-08-26 PROCEDURE — 83986 ASSAY PH BODY FLUID NOS: CPT | Mod: QW

## 2024-08-26 NOTE — OB PROVIDER TRIAGE NOTE - HISTORY OF PRESENT ILLNESS
35 y/o  at 38w4d GA, MARCELLA 24 presenting with c/o intermittent leakage of fluid and decreased FM since 0900 this morning.  Patient states that leakage has not been continuous, fluid is clear and mucus-like in consistency.  Patient reports feeling fetal movement when walking into hospital and again at time of interview.  Pt was seen at [facility] yesterday for ctx, was 2cm dilated on SVE at that time as per patient.  Denies: vaginal bleeding, abdominal pain, contractions, nausea/vomiting.    Primary OB care with Dr Gerardo  H/o PPROM @ 22 weeks with fetal demise ()  GDMA2, on Lantus 24U QHS and Humalog 8U w breakfast and 10U w lunch  MFM sono from 24: Vertex presentation, anterior placenta, 3241g (59%), AC 79%, KRISTOFER wnl  EFW extrapolated from last MFM sono: 3300g  H/o recurrent yeast vaginitis throughout pregancy  GBS: negative (24)

## 2024-08-26 NOTE — OB PROVIDER TRIAGE NOTE - NS_OBGYNHISTORY_OBGYN_ALL_OB_FT
OB History:   - FT , 10/08/07, 5000g female, uncomplicated  - PPROM @ 22 weeks with fetal demise,     GYN History: Denies history of endometriosis/uterine fibroids/cysts/abnormal pap smears/STIs

## 2024-08-26 NOTE — OB PROVIDER TRIAGE NOTE - NSHPPHYSICALEXAM_GEN_ALL_CORE
ICU Vital Signs Last 24 Hrs  T(C): 36.8 (26 Aug 2024 21:20), Max: 36.8 (26 Aug 2024 20:47)  T(F): 98.24 (26 Aug 2024 21:20), Max: 98.24 (26 Aug 2024 21:20)  HR: 88 (26 Aug 2024 22:21) (83 - 100)  BP: 115/64 (26 Aug 2024 22:21) (103/62 - 122/73)  RR: 16 (26 Aug 2024 21:20) (16 - 16)    General: Patient sitting comfortably in bed, A&Ox3  Abd: soft, non-tender, gravid, TOCO in place  Bedside Transabdominal Sonogram: Vertex presentation, anterior placenta, +FH noted, M mode 126 BPM, KRISTOFER 14.11, BPP 8/8, images saved in ASOB  EFM: 125 BPM baseline, moderate variability, +accels, - decels, category I tracing, reactive NST  [place]: Irregular contractions  SSE: No pooling of fluid, Nitrazine neg, Ferning neg, no bleeding, cervix appears 1-2cm dilated  SVE: 2/50/-3  Ext:  FROM bilateral no edema  Neuro: grossly intact

## 2024-08-26 NOTE — OB PROVIDER TRIAGE NOTE - NSOBPROVIDERNOTE_OBGYN_ALL_OB_FT
A: 33 y/o  @ 38w2d GA now with good fetal movement, r/o SROM. H/o GDMA2.    P: No evidence of acute process     Nitrazine neg, fern neg     BPP , KRISTOFER 14.11cm     EFM/[place], cat I tracing, reactive NST     SVE unchanged from previous exam     Reassuring fetal/maternal status     Patient stable for discharge    Discharged @ :  - Patient to be discharged home with follow up and return precautions  - Please follow up with your obstetrician at your next scheduled appointment.   - Please return for decreased / no fetal movement, vaginal bleeding similar to that of a period, leaking / gush of fluid, regular contractions occurring 4-5 minutes  for one hour or requiring pain medication   - Patient educated of plan and demonstrates understanding. All questions answered. Discharge instructions provided and signed    Case discussed with Dr Rachael Borges PA-C

## 2024-08-28 ENCOUNTER — APPOINTMENT (OUTPATIENT)
Dept: OBGYN | Facility: CLINIC | Age: 34
End: 2024-08-28
Payer: MEDICAID

## 2024-08-28 ENCOUNTER — NON-APPOINTMENT (OUTPATIENT)
Age: 34
End: 2024-08-28

## 2024-08-28 ENCOUNTER — APPOINTMENT (OUTPATIENT)
Dept: ANTEPARTUM | Facility: CLINIC | Age: 34
End: 2024-08-28
Payer: MEDICAID

## 2024-08-28 ENCOUNTER — ASOB RESULT (OUTPATIENT)
Age: 34
End: 2024-08-28

## 2024-08-28 PROCEDURE — 76818 FETAL BIOPHYS PROFILE W/NST: CPT

## 2024-08-28 PROCEDURE — 99212 OFFICE O/P EST SF 10 MIN: CPT | Mod: TH

## 2024-08-29 DIAGNOSIS — O24.414 GESTATIONAL DIABETES MELLITUS IN PREGNANCY, INSULIN CONTROLLED: ICD-10-CM

## 2024-08-29 DIAGNOSIS — Z87.440 PERSONAL HISTORY OF URINARY (TRACT) INFECTIONS: ICD-10-CM

## 2024-08-29 DIAGNOSIS — Z3A.38 38 WEEKS GESTATION OF PREGNANCY: ICD-10-CM

## 2024-08-29 DIAGNOSIS — O09.293 SUPERVISION OF PREGNANCY WITH OTHER POOR REPRODUCTIVE OR OBSTETRIC HISTORY, THIRD TRIMESTER: ICD-10-CM

## 2024-08-29 DIAGNOSIS — Z03.71 ENCOUNTER FOR SUSPECTED PROBLEM WITH AMNIOTIC CAVITY AND MEMBRANE RULED OUT: ICD-10-CM

## 2024-09-03 ENCOUNTER — APPOINTMENT (OUTPATIENT)
Dept: ANTEPARTUM | Facility: CLINIC | Age: 34
End: 2024-09-03

## 2024-09-03 ENCOUNTER — APPOINTMENT (OUTPATIENT)
Dept: OBGYN | Facility: CLINIC | Age: 34
End: 2024-09-03
Payer: MEDICAID

## 2024-09-03 VITALS — WEIGHT: 191 LBS | SYSTOLIC BLOOD PRESSURE: 119 MMHG | DIASTOLIC BLOOD PRESSURE: 80 MMHG | BODY MASS INDEX: 33.83 KG/M2

## 2024-09-03 PROCEDURE — 76818 FETAL BIOPHYS PROFILE W/NST: CPT

## 2024-09-03 PROCEDURE — 99212 OFFICE O/P EST SF 10 MIN: CPT | Mod: TH

## 2024-09-04 ENCOUNTER — APPOINTMENT (OUTPATIENT)
Dept: ANTEPARTUM | Facility: CLINIC | Age: 34
End: 2024-09-04
Payer: MEDICAID

## 2024-09-04 ENCOUNTER — ASOB RESULT (OUTPATIENT)
Age: 34
End: 2024-09-04

## 2024-09-04 ENCOUNTER — INPATIENT (INPATIENT)
Facility: HOSPITAL | Age: 34
LOS: 2 days | Discharge: HOME | End: 2024-09-07
Attending: OBSTETRICS & GYNECOLOGY | Admitting: OBSTETRICS & GYNECOLOGY
Payer: MEDICAID

## 2024-09-04 VITALS
SYSTOLIC BLOOD PRESSURE: 105 MMHG | RESPIRATION RATE: 16 BRPM | DIASTOLIC BLOOD PRESSURE: 68 MMHG | HEART RATE: 90 BPM | TEMPERATURE: 98 F

## 2024-09-04 DIAGNOSIS — O24.419 GESTATIONAL DIABETES MELLITUS IN PREGNANCY, UNSPECIFIED CONTROL: ICD-10-CM

## 2024-09-04 DIAGNOSIS — O26.899 OTHER SPECIFIED PREGNANCY RELATED CONDITIONS, UNSPECIFIED TRIMESTER: ICD-10-CM

## 2024-09-04 LAB
BASOPHILS # BLD AUTO: 0.02 K/UL — SIGNIFICANT CHANGE UP (ref 0–0.2)
BASOPHILS NFR BLD AUTO: 0.2 % — SIGNIFICANT CHANGE UP (ref 0–2)
BLD GP AB SCN SERPL QL: NEGATIVE — SIGNIFICANT CHANGE UP
EOSINOPHIL # BLD AUTO: 0.06 K/UL — SIGNIFICANT CHANGE UP (ref 0–0.5)
EOSINOPHIL NFR BLD AUTO: 0.6 % — SIGNIFICANT CHANGE UP (ref 0–6)
GLUCOSE BLDC GLUCOMTR-MCNC: 112 MG/DL — HIGH (ref 70–99)
GLUCOSE BLDC GLUCOMTR-MCNC: 78 MG/DL — SIGNIFICANT CHANGE UP (ref 70–99)
HCT VFR BLD CALC: 35.9 % — SIGNIFICANT CHANGE UP (ref 34.5–45)
HGB BLD-MCNC: 12.2 G/DL — SIGNIFICANT CHANGE UP (ref 11.5–15.5)
IANC: 6.44 K/UL — SIGNIFICANT CHANGE UP (ref 1.8–7.4)
IMM GRANULOCYTES NFR BLD AUTO: 0.5 % — SIGNIFICANT CHANGE UP (ref 0–0.9)
LYMPHOCYTES # BLD AUTO: 2.74 K/UL — SIGNIFICANT CHANGE UP (ref 1–3.3)
LYMPHOCYTES # BLD AUTO: 27.1 % — SIGNIFICANT CHANGE UP (ref 13–44)
MCHC RBC-ENTMCNC: 29.9 PG — SIGNIFICANT CHANGE UP (ref 27–34)
MCHC RBC-ENTMCNC: 34 GM/DL — SIGNIFICANT CHANGE UP (ref 32–36)
MCV RBC AUTO: 88 FL — SIGNIFICANT CHANGE UP (ref 80–100)
MONOCYTES # BLD AUTO: 0.8 K/UL — SIGNIFICANT CHANGE UP (ref 0–0.9)
MONOCYTES NFR BLD AUTO: 7.9 % — SIGNIFICANT CHANGE UP (ref 2–14)
NEUTROPHILS # BLD AUTO: 6.44 K/UL — SIGNIFICANT CHANGE UP (ref 1.8–7.4)
NEUTROPHILS NFR BLD AUTO: 63.7 % — SIGNIFICANT CHANGE UP (ref 43–77)
NRBC # BLD AUTO: 0 K/UL — SIGNIFICANT CHANGE UP (ref 0–0)
NRBC # BLD: 0 /100 WBCS — SIGNIFICANT CHANGE UP (ref 0–0)
NRBC # FLD: 0 K/UL — SIGNIFICANT CHANGE UP (ref 0–0)
NRBC BLD-RTO: 0 /100 WBCS — SIGNIFICANT CHANGE UP (ref 0–0)
PLATELET # BLD AUTO: 135 K/UL — LOW (ref 150–400)
RBC # BLD: 4.08 M/UL — SIGNIFICANT CHANGE UP (ref 3.8–5.2)
RBC # FLD: 13.8 % — SIGNIFICANT CHANGE UP (ref 10.3–14.5)
RH IG SCN BLD-IMP: POSITIVE — SIGNIFICANT CHANGE UP
WBC # BLD: 10.11 K/UL — SIGNIFICANT CHANGE UP (ref 3.8–10.5)
WBC # FLD AUTO: 10.11 K/UL — SIGNIFICANT CHANGE UP (ref 3.8–10.5)

## 2024-09-04 PROCEDURE — 76819 FETAL BIOPHYS PROFIL W/O NST: CPT | Mod: 26

## 2024-09-04 PROCEDURE — 76815 OB US LIMITED FETUS(S): CPT | Mod: 26

## 2024-09-04 RX ORDER — SODIUM CHLORIDE 9 G/1000ML
1000 INJECTION, SOLUTION INTRAVENOUS
Refills: 0 | Status: DISCONTINUED | OUTPATIENT
Start: 2024-09-04 | End: 2024-09-06

## 2024-09-04 RX ORDER — OXYTOCIN-SODIUM CHLORIDE 0.9% IV SOLN 30 UNIT/500ML 30-0.9/5 UT/ML-%
SOLUTION INTRAVENOUS
Qty: 20 | Refills: 0 | Status: COMPLETED | OUTPATIENT
Start: 2024-09-04 | End: 2024-09-04

## 2024-09-05 ENCOUNTER — APPOINTMENT (OUTPATIENT)
Dept: ANTEPARTUM | Facility: CLINIC | Age: 34
End: 2024-09-05

## 2024-09-05 ENCOUNTER — APPOINTMENT (OUTPATIENT)
Dept: OBGYN | Facility: CLINIC | Age: 34
End: 2024-09-05

## 2024-09-05 ENCOUNTER — TRANSCRIPTION ENCOUNTER (OUTPATIENT)
Age: 34
End: 2024-09-05

## 2024-09-05 DIAGNOSIS — O99.810 ABNORMAL GLUCOSE COMPLICATING PREGNANCY: ICD-10-CM

## 2024-09-05 LAB
GLUCOSE BLDC GLUCOMTR-MCNC: 105 MG/DL — HIGH (ref 70–99)
GLUCOSE BLDC GLUCOMTR-MCNC: 109 MG/DL — HIGH (ref 70–99)
GLUCOSE BLDC GLUCOMTR-MCNC: 115 MG/DL — HIGH (ref 70–99)
GLUCOSE BLDC GLUCOMTR-MCNC: 85 MG/DL — SIGNIFICANT CHANGE UP (ref 70–99)
GLUCOSE BLDC GLUCOMTR-MCNC: 89 MG/DL — SIGNIFICANT CHANGE UP (ref 70–99)
GLUCOSE BLDC GLUCOMTR-MCNC: 96 MG/DL — SIGNIFICANT CHANGE UP (ref 70–99)
T PALLIDUM AB TITR SER: NEGATIVE — SIGNIFICANT CHANGE UP

## 2024-09-05 PROCEDURE — 59409 OBSTETRICAL CARE: CPT | Mod: U9

## 2024-09-05 RX ORDER — IBUPROFEN 200 MG
600 TABLET ORAL EVERY 6 HOURS
Refills: 0 | Status: COMPLETED | OUTPATIENT
Start: 2024-09-05 | End: 2025-08-04

## 2024-09-05 RX ORDER — SODIUM CHLORIDE 9 G/1000ML
1000 INJECTION, SOLUTION INTRAVENOUS ONCE
Refills: 0 | Status: DISCONTINUED | OUTPATIENT
Start: 2024-09-05 | End: 2024-09-05

## 2024-09-05 RX ORDER — OXYTOCIN-SODIUM CHLORIDE 0.9% IV SOLN 30 UNIT/500ML 30-0.9/5 UT/ML-%
41.67 SOLUTION INTRAVENOUS
Qty: 20 | Refills: 0 | Status: DISCONTINUED | OUTPATIENT
Start: 2024-09-05 | End: 2024-09-06

## 2024-09-05 RX ORDER — ONDANSETRON HCL/PF 4 MG/2 ML
4 VIAL (ML) INJECTION ONCE
Refills: 0 | Status: COMPLETED | OUTPATIENT
Start: 2024-09-05 | End: 2024-09-05

## 2024-09-05 RX ORDER — SODIUM CHLORIDE 9 G/1000ML
1000 INJECTION, SOLUTION INTRAVENOUS ONCE
Refills: 0 | Status: COMPLETED | OUTPATIENT
Start: 2024-09-05 | End: 2024-09-05

## 2024-09-05 RX ORDER — OXYCODONE HYDROCHLORIDE 30 MG/1
5 TABLET ORAL ONCE
Refills: 0 | Status: DISCONTINUED | OUTPATIENT
Start: 2024-09-05 | End: 2024-09-07

## 2024-09-05 RX ORDER — WITCH HAZEL LEAF
1 FLUID EXTRACT MISCELLANEOUS EVERY 4 HOURS
Refills: 0 | Status: DISCONTINUED | OUTPATIENT
Start: 2024-09-05 | End: 2024-09-07

## 2024-09-05 RX ORDER — MAGNESIUM HYDROXIDE 400 MG/5ML
30 SUSPENSION ORAL
Refills: 0 | Status: DISCONTINUED | OUTPATIENT
Start: 2024-09-05 | End: 2024-09-07

## 2024-09-05 RX ORDER — DIPHENHYDRAMINE HCL 12.5MG/5ML
25 ELIXIR ORAL EVERY 6 HOURS
Refills: 0 | Status: DISCONTINUED | OUTPATIENT
Start: 2024-09-05 | End: 2024-09-07

## 2024-09-05 RX ORDER — ACETAMINOPHEN 500 MG/5ML
975 LIQUID (ML) ORAL ONCE
Refills: 0 | Status: DISCONTINUED | OUTPATIENT
Start: 2024-09-05 | End: 2024-09-07

## 2024-09-05 RX ORDER — PIPERACILLIN-TAZO-DEXTROSE,ISO 3.375G/5
4.5 IV SOLUTION, PIGGYBACK PREMIX FROZEN(ML) INTRAVENOUS EVERY 8 HOURS
Refills: 0 | Status: DISCONTINUED | OUTPATIENT
Start: 2024-09-05 | End: 2024-09-06

## 2024-09-05 RX ORDER — OXYTOCIN-SODIUM CHLORIDE 0.9% IV SOLN 30 UNIT/500ML 30-0.9/5 UT/ML-%
SOLUTION INTRAVENOUS
Qty: 30 | Refills: 0 | Status: DISCONTINUED | OUTPATIENT
Start: 2024-09-05 | End: 2024-09-05

## 2024-09-05 RX ORDER — SIMETHICONE 80 MG
80 TABLET,CHEWABLE ORAL EVERY 4 HOURS
Refills: 0 | Status: DISCONTINUED | OUTPATIENT
Start: 2024-09-05 | End: 2024-09-07

## 2024-09-05 RX ORDER — KETOROLAC TROMETHAMINE 30 MG/ML
30 INJECTION, SOLUTION INTRAMUSCULAR; INTRAVENOUS ONCE
Refills: 0 | Status: DISCONTINUED | OUTPATIENT
Start: 2024-09-05 | End: 2024-09-05

## 2024-09-05 RX ORDER — BENZOCAINE 220 MG/G
1 SPRAY, METERED PERIODONTAL EVERY 6 HOURS
Refills: 0 | Status: DISCONTINUED | OUTPATIENT
Start: 2024-09-05 | End: 2024-09-07

## 2024-09-05 RX ORDER — PRAMOXINE HCL 1 %
1 GEL (GRAM) TOPICAL EVERY 4 HOURS
Refills: 0 | Status: DISCONTINUED | OUTPATIENT
Start: 2024-09-05 | End: 2024-09-07

## 2024-09-05 RX ORDER — MODIFIED LANOLIN 100 %
1 CREAM (GRAM) TOPICAL EVERY 6 HOURS
Refills: 0 | Status: DISCONTINUED | OUTPATIENT
Start: 2024-09-05 | End: 2024-09-07

## 2024-09-05 RX ORDER — PIPERACILLIN-TAZO-DEXTROSE,ISO 3.375G/5
4.5 IV SOLUTION, PIGGYBACK PREMIX FROZEN(ML) INTRAVENOUS EVERY 8 HOURS
Refills: 0 | Status: DISCONTINUED | OUTPATIENT
Start: 2024-09-05 | End: 2024-09-05

## 2024-09-05 RX ORDER — OXYCODONE HYDROCHLORIDE 30 MG/1
5 TABLET ORAL
Refills: 0 | Status: DISCONTINUED | OUTPATIENT
Start: 2024-09-05 | End: 2024-09-07

## 2024-09-05 RX ORDER — HYDROCORTISONE 10 MG/G
1 CREAM TOPICAL EVERY 6 HOURS
Refills: 0 | Status: DISCONTINUED | OUTPATIENT
Start: 2024-09-05 | End: 2024-09-07

## 2024-09-05 RX ORDER — ACETAMINOPHEN 500 MG/5ML
975 LIQUID (ML) ORAL
Refills: 0 | Status: DISCONTINUED | OUTPATIENT
Start: 2024-09-05 | End: 2024-09-07

## 2024-09-05 RX ORDER — DIBUCAINE 10 MG/G
1 OINTMENT TOPICAL EVERY 6 HOURS
Refills: 0 | Status: DISCONTINUED | OUTPATIENT
Start: 2024-09-05 | End: 2024-09-07

## 2024-09-05 RX ORDER — ACETAMINOPHEN 500 MG/5ML
1000 LIQUID (ML) ORAL ONCE
Refills: 0 | Status: DISCONTINUED | OUTPATIENT
Start: 2024-09-05 | End: 2024-09-05

## 2024-09-05 RX ORDER — PRENATAL 136/IRON/FOLIC ACID 27 MG-1 MG
1 TABLET ORAL DAILY
Refills: 0 | Status: DISCONTINUED | OUTPATIENT
Start: 2024-09-05 | End: 2024-09-07

## 2024-09-05 RX ADMIN — Medication 1 APPLICATION(S): at 05:48

## 2024-09-05 RX ADMIN — OXYTOCIN-SODIUM CHLORIDE 0.9% IV SOLN 30 UNIT/500ML 125 MILLIUNIT(S)/MIN: 30-0.9/5 SOLUTION at 19:47

## 2024-09-05 RX ADMIN — SODIUM CHLORIDE 125 MILLILITER(S): 9 INJECTION, SOLUTION INTRAVENOUS at 07:56

## 2024-09-05 RX ADMIN — OXYTOCIN-SODIUM CHLORIDE 0.9% IV SOLN 30 UNIT/500ML 2 MILLIUNIT(S)/MIN: 30-0.9/5 SOLUTION at 06:43

## 2024-09-05 RX ADMIN — OXYTOCIN-SODIUM CHLORIDE 0.9% IV SOLN 30 UNIT/500ML 2 MILLIUNIT(S)/MIN: 30-0.9/5 SOLUTION at 07:55

## 2024-09-05 RX ADMIN — Medication 4 MILLIGRAM(S): at 14:22

## 2024-09-05 RX ADMIN — SODIUM CHLORIDE 1000 MILLILITER(S): 9 INJECTION, SOLUTION INTRAVENOUS at 17:28

## 2024-09-05 RX ADMIN — Medication 200 GRAM(S): at 17:49

## 2024-09-05 RX ADMIN — KETOROLAC TROMETHAMINE 30 MILLIGRAM(S): 30 INJECTION, SOLUTION INTRAMUSCULAR; INTRAVENOUS at 21:30

## 2024-09-05 RX ADMIN — OXYTOCIN-SODIUM CHLORIDE 0.9% IV SOLN 30 UNIT/500ML 1000 MILLIUNIT(S)/MIN: 30-0.9/5 SOLUTION at 19:17

## 2024-09-05 RX ADMIN — Medication 600 MILLILITER(S): at 15:11

## 2024-09-05 RX ADMIN — KETOROLAC TROMETHAMINE 30 MILLIGRAM(S): 30 INJECTION, SOLUTION INTRAMUSCULAR; INTRAVENOUS at 21:06

## 2024-09-05 RX ADMIN — Medication 125 MILLILITER(S): at 15:45

## 2024-09-06 RX ORDER — IBUPROFEN 200 MG
600 TABLET ORAL EVERY 6 HOURS
Refills: 0 | Status: DISCONTINUED | OUTPATIENT
Start: 2024-09-06 | End: 2024-09-07

## 2024-09-06 RX ADMIN — Medication 600 MILLIGRAM(S): at 23:23

## 2024-09-06 RX ADMIN — Medication 200 GRAM(S): at 02:07

## 2024-09-06 RX ADMIN — Medication 975 MILLIGRAM(S): at 21:31

## 2024-09-06 RX ADMIN — Medication 600 MILLIGRAM(S): at 23:53

## 2024-09-06 RX ADMIN — Medication 3 MILLILITER(S): at 13:35

## 2024-09-06 RX ADMIN — MAGNESIUM HYDROXIDE 30 MILLILITER(S): 400 SUSPENSION ORAL at 09:28

## 2024-09-06 RX ADMIN — Medication 975 MILLIGRAM(S): at 03:15

## 2024-09-06 RX ADMIN — Medication 975 MILLIGRAM(S): at 03:47

## 2024-09-06 RX ADMIN — Medication 3 MILLILITER(S): at 03:59

## 2024-09-06 RX ADMIN — Medication 600 MILLIGRAM(S): at 14:05

## 2024-09-06 RX ADMIN — Medication 975 MILLIGRAM(S): at 21:01

## 2024-09-06 RX ADMIN — Medication 600 MILLIGRAM(S): at 13:33

## 2024-09-07 VITALS
HEART RATE: 80 BPM | RESPIRATION RATE: 18 BRPM | OXYGEN SATURATION: 99 % | DIASTOLIC BLOOD PRESSURE: 72 MMHG | SYSTOLIC BLOOD PRESSURE: 118 MMHG | TEMPERATURE: 98 F

## 2024-09-07 RX ORDER — ACETAMINOPHEN 500 MG/5ML
3 LIQUID (ML) ORAL
Qty: 0 | Refills: 0 | DISCHARGE
Start: 2024-09-07

## 2024-09-07 RX ORDER — IBUPROFEN 200 MG
1 TABLET ORAL
Qty: 0 | Refills: 0 | DISCHARGE
Start: 2024-09-07

## 2024-09-07 RX ADMIN — Medication 600 MILLIGRAM(S): at 05:55

## 2024-09-07 RX ADMIN — Medication 600 MILLIGRAM(S): at 05:25

## 2024-09-30 NOTE — ED ADULT NURSE NOTE - DOES PATIENT HAVE ADVANCE DIRECTIVE
GA planned; Risks discussed including dental injury and more serious complications including cardiac and pulmonary complications and stroke.  Patient expresses understanding with regard to risks of anesthesia and wishes to proceed. No

## 2024-10-11 NOTE — ED PROVIDER NOTE - DISPOSITION TYPE
Does the Patient have a central line?  Yes: See Invasive (Oncology) Lines Flowsheet for CVAD documentation.   
ADMIT

## 2024-10-28 ENCOUNTER — APPOINTMENT (OUTPATIENT)
Dept: OBGYN | Facility: CLINIC | Age: 34
End: 2024-10-28

## 2024-12-03 ENCOUNTER — APPOINTMENT (OUTPATIENT)
Dept: OBGYN | Facility: CLINIC | Age: 34
End: 2024-12-03

## 2024-12-31 ENCOUNTER — APPOINTMENT (OUTPATIENT)
Dept: OBGYN | Facility: CLINIC | Age: 34
End: 2024-12-31
Payer: MEDICAID

## 2024-12-31 VITALS — BODY MASS INDEX: 30.65 KG/M2 | DIASTOLIC BLOOD PRESSURE: 77 MMHG | SYSTOLIC BLOOD PRESSURE: 120 MMHG | WEIGHT: 173 LBS

## 2024-12-31 DIAGNOSIS — Z86.32 PERSONAL HISTORY OF GESTATIONAL DIABETES: ICD-10-CM

## 2024-12-31 PROCEDURE — 99213 OFFICE O/P EST LOW 20 MIN: CPT | Mod: TH,25

## 2024-12-31 PROCEDURE — G0444 DEPRESSION SCREEN ANNUAL: CPT | Mod: 59

## 2025-01-14 ENCOUNTER — APPOINTMENT (OUTPATIENT)
Dept: ANTEPARTUM | Facility: CLINIC | Age: 35
End: 2025-01-14

## 2025-01-14 ENCOUNTER — APPOINTMENT (OUTPATIENT)
Dept: OBGYN | Facility: CLINIC | Age: 35
End: 2025-01-14

## 2025-02-07 ENCOUNTER — EMERGENCY (EMERGENCY)
Facility: HOSPITAL | Age: 35
LOS: 1 days | Discharge: HOME | End: 2025-02-07
Attending: EMERGENCY MEDICINE | Admitting: EMERGENCY MEDICINE
Payer: MEDICAID

## 2025-02-07 VITALS
TEMPERATURE: 98 F | HEART RATE: 91 BPM | RESPIRATION RATE: 18 BRPM | DIASTOLIC BLOOD PRESSURE: 65 MMHG | OXYGEN SATURATION: 99 % | WEIGHT: 174.17 LBS | SYSTOLIC BLOOD PRESSURE: 106 MMHG

## 2025-02-07 VITALS
OXYGEN SATURATION: 100 % | SYSTOLIC BLOOD PRESSURE: 114 MMHG | TEMPERATURE: 98 F | RESPIRATION RATE: 17 BRPM | HEART RATE: 76 BPM | DIASTOLIC BLOOD PRESSURE: 78 MMHG

## 2025-02-07 LAB
ALBUMIN SERPL ELPH-MCNC: 4.4 G/DL — SIGNIFICANT CHANGE UP (ref 3.3–5)
ALP SERPL-CCNC: 72 U/L — SIGNIFICANT CHANGE UP (ref 40–120)
ALT FLD-CCNC: 20 U/L — SIGNIFICANT CHANGE UP (ref 4–33)
ANION GAP SERPL CALC-SCNC: 13 MMOL/L — SIGNIFICANT CHANGE UP (ref 7–14)
APPEARANCE UR: ABNORMAL
AST SERPL-CCNC: 17 U/L — SIGNIFICANT CHANGE UP (ref 4–32)
BACTERIA # UR AUTO: NEGATIVE /HPF — SIGNIFICANT CHANGE UP
BASOPHILS # BLD AUTO: 0.06 K/UL — SIGNIFICANT CHANGE UP (ref 0–0.2)
BASOPHILS NFR BLD AUTO: 0.5 % — SIGNIFICANT CHANGE UP (ref 0–2)
BILIRUB SERPL-MCNC: <0.2 MG/DL — SIGNIFICANT CHANGE UP (ref 0.2–1.2)
BILIRUB UR-MCNC: ABNORMAL
BUN SERPL-MCNC: 13 MG/DL — SIGNIFICANT CHANGE UP (ref 7–23)
CALCIUM SERPL-MCNC: 9.7 MG/DL — SIGNIFICANT CHANGE UP (ref 8.4–10.5)
CAST: 0 /LPF — SIGNIFICANT CHANGE UP (ref 0–4)
CHLORIDE SERPL-SCNC: 100 MMOL/L — SIGNIFICANT CHANGE UP (ref 98–107)
CO2 SERPL-SCNC: 26 MMOL/L — SIGNIFICANT CHANGE UP (ref 22–31)
COD CRY URNS QL: PRESENT
COLOR SPEC: ABNORMAL
CREAT SERPL-MCNC: 0.68 MG/DL — SIGNIFICANT CHANGE UP (ref 0.5–1.3)
DIFF PNL FLD: ABNORMAL
EGFR: 117 ML/MIN/1.73M2 — SIGNIFICANT CHANGE UP
EOSINOPHIL # BLD AUTO: 0.15 K/UL — SIGNIFICANT CHANGE UP (ref 0–0.5)
EOSINOPHIL NFR BLD AUTO: 1.2 % — SIGNIFICANT CHANGE UP (ref 0–6)
GLUCOSE SERPL-MCNC: 139 MG/DL — HIGH (ref 70–99)
GLUCOSE UR QL: NEGATIVE MG/DL — SIGNIFICANT CHANGE UP
HCG SERPL-ACNC: <1 MIU/ML — SIGNIFICANT CHANGE UP
HCT VFR BLD CALC: 38.1 % — SIGNIFICANT CHANGE UP (ref 34.5–45)
HGB BLD-MCNC: 12.7 G/DL — SIGNIFICANT CHANGE UP (ref 11.5–15.5)
IANC: 6.75 K/UL — SIGNIFICANT CHANGE UP (ref 1.8–7.4)
IMM GRANULOCYTES NFR BLD AUTO: 0.6 % — SIGNIFICANT CHANGE UP (ref 0–0.9)
KETONES UR-MCNC: NEGATIVE MG/DL — SIGNIFICANT CHANGE UP
LEUKOCYTE ESTERASE UR-ACNC: ABNORMAL
LYMPHOCYTES # BLD AUTO: 36.1 % — SIGNIFICANT CHANGE UP (ref 13–44)
LYMPHOCYTES # BLD AUTO: 4.4 K/UL — HIGH (ref 1–3.3)
MCHC RBC-ENTMCNC: 30.6 PG — SIGNIFICANT CHANGE UP (ref 27–34)
MCHC RBC-ENTMCNC: 33.3 G/DL — SIGNIFICANT CHANGE UP (ref 32–36)
MCV RBC AUTO: 91.8 FL — SIGNIFICANT CHANGE UP (ref 80–100)
MONOCYTES # BLD AUTO: 0.75 K/UL — SIGNIFICANT CHANGE UP (ref 0–0.9)
MONOCYTES NFR BLD AUTO: 6.2 % — SIGNIFICANT CHANGE UP (ref 2–14)
NEUTROPHILS # BLD AUTO: 6.75 K/UL — SIGNIFICANT CHANGE UP (ref 1.8–7.4)
NEUTROPHILS NFR BLD AUTO: 55.4 % — SIGNIFICANT CHANGE UP (ref 43–77)
NITRITE UR-MCNC: NEGATIVE — SIGNIFICANT CHANGE UP
NRBC # BLD AUTO: 0 K/UL — SIGNIFICANT CHANGE UP (ref 0–0)
NRBC # BLD: 0 /100 WBCS — SIGNIFICANT CHANGE UP (ref 0–0)
NRBC # FLD: 0 K/UL — SIGNIFICANT CHANGE UP (ref 0–0)
NRBC BLD-RTO: 0 /100 WBCS — SIGNIFICANT CHANGE UP (ref 0–0)
PH UR: 5.5 — SIGNIFICANT CHANGE UP (ref 5–8)
PLATELET # BLD AUTO: 309 K/UL — SIGNIFICANT CHANGE UP (ref 150–400)
POTASSIUM SERPL-MCNC: 3.8 MMOL/L — SIGNIFICANT CHANGE UP (ref 3.5–5.3)
POTASSIUM SERPL-SCNC: 3.8 MMOL/L — SIGNIFICANT CHANGE UP (ref 3.5–5.3)
PROT SERPL-MCNC: 7.6 G/DL — SIGNIFICANT CHANGE UP (ref 6–8.3)
PROT UR-MCNC: 100 MG/DL
RBC # BLD: 4.15 M/UL — SIGNIFICANT CHANGE UP (ref 3.8–5.2)
RBC # FLD: 12.1 % — SIGNIFICANT CHANGE UP (ref 10.3–14.5)
RBC CASTS # UR COMP ASSIST: 12 /HPF — HIGH (ref 0–4)
REVIEW: SIGNIFICANT CHANGE UP
SODIUM SERPL-SCNC: 139 MMOL/L — SIGNIFICANT CHANGE UP (ref 135–145)
SP GR SPEC: 1.04 — HIGH (ref 1–1.03)
SQUAMOUS # UR AUTO: 1 /HPF — SIGNIFICANT CHANGE UP (ref 0–5)
UROBILINOGEN FLD QL: 1 MG/DL — SIGNIFICANT CHANGE UP (ref 0.2–1)
WBC # BLD: 12.18 K/UL — HIGH (ref 3.8–10.5)
WBC # FLD AUTO: 12.18 K/UL — HIGH (ref 3.8–10.5)
WBC UR QL: 4 /HPF — SIGNIFICANT CHANGE UP (ref 0–5)

## 2025-02-07 PROCEDURE — 99284 EMERGENCY DEPT VISIT MOD MDM: CPT

## 2025-02-07 PROCEDURE — 76830 TRANSVAGINAL US NON-OB: CPT | Mod: 26

## 2025-02-07 RX ORDER — BACTERIOSTATIC SODIUM CHLORIDE 0.9 %
1000 VIAL (ML) INJECTION ONCE
Refills: 0 | Status: COMPLETED | OUTPATIENT
Start: 2025-02-07 | End: 2025-02-07

## 2025-02-07 RX ORDER — ACETAMINOPHEN 160 MG/5ML
650 SUSPENSION ORAL ONCE
Refills: 0 | Status: COMPLETED | OUTPATIENT
Start: 2025-02-07 | End: 2025-02-07

## 2025-02-07 RX ADMIN — Medication 1000 MILLILITER(S): at 14:18

## 2025-02-07 RX ADMIN — ACETAMINOPHEN 650 MILLIGRAM(S): 160 SUSPENSION ORAL at 14:17

## 2025-02-07 NOTE — ED PROVIDER NOTE - NSFOLLOWUPINSTRUCTIONS_ED_ALL_ED_FT
FOLLOW UP WITH YOUR  OBSTETRIC/GYNECOLOGY  DOCTOR WITHIN 1 WEEK  BRING THE COPIES OF YOUR RESULTS WITH YOU (PROVIDED)  CAN TAKE TYLENOL 650MG ORALLY EVERY 6 HOURS FOR PAIN OR FEVER.  WORSENING SYMPTOMS.

## 2025-02-07 NOTE — ED PROVIDER NOTE - PATIENT PORTAL LINK FT
You can access the FollowMyHealth Patient Portal offered by [facility] by registering at the following website: [email or web address]. By joining [employer]’s FollowMyHealth portal, you will also be able to view your health information using other applications (apps) compatible with our system.

## 2025-02-07 NOTE — ED ADULT NURSE NOTE - OBJECTIVE STATEMENT
This is a y/o female alert and oriented x 4, speaks Urdu refused ,  was on the phone to translate. Past medical history of , post partum 5 months ago. Reports vaginal bleeding x 2 months, minimal amount but this past 2 days patient was passing clots, and heavy bleeding associated with suprapubic  pain/discomfort. Denies dizziness or feeling faint. Appears well. No pallor noted. IV initiated on left AC g 20 blood work sent to lab, due meds given.  No sob, not in any distress. Dr Salinas in to see patient.

## 2025-02-07 NOTE — ED PROVIDER NOTE - NSICDXPASTMEDICALHX_GEN_ALL_CORE_FT
show
PAST MEDICAL HISTORY:  UTI (urinary tract infection) h/o    Yeast infection of the vagina h/o

## 2025-02-07 NOTE — ED ADULT NURSE NOTE - PRIMARY CARE PROVIDER
Abdiel Gardner Alar Island Pedicle Flap Text: The defect edges were debeveled with a #15 scalpel blade.  Given the location of the defect, shape of the defect and the proximity to the alar rim an island pedicle advancement flap was deemed most appropriate.  Using a sterile surgical marker, an appropriate advancement flap was drawn incorporating the defect, outlining the appropriate donor tissue and placing the expected incisions within the nasal ala running parallel to the alar rim. The area thus outlined was incised with a #15 scalpel blade.  The skin margins were undermined minimally to an appropriate distance in all directions around the primary defect and laterally outward around the island pedicle utilizing iris scissors.  There was minimal undermining beneath the pedicle flap.

## 2025-02-07 NOTE — ED PROVIDER NOTE - OBJECTIVE STATEMENT
35 y/o female with no significant PMHx who presented to the ED for vaginal bleeding X 2 months. Pt notes having a child 5 months ago and over the past 2 months has some return of bleeding with clots yesterday and lower abd pain. Pt denies any fever, chills, nausea, vomiting, SOB, chest pain, or dysuria.

## 2025-02-07 NOTE — ED PROVIDER NOTE - PROGRESS NOTE DETAILS
DD ED ATTF no pmhx gave birth 5 mos ago, then had vaginal bleeding, 2 mos ago bleeding again, until yesterday now associated with clots.  Crampy lower abd pain, no tenderness on exam.  Check US eval for RPOC.  Hgb 12.  Awaiting US then d/c home f/u OBGYN.  Labs benign, VS acceptable.  UA small LE - will ask pt if having dysuria, if not can follow up culture

## 2025-02-07 NOTE — ED PROVIDER NOTE - CLINICAL SUMMARY MEDICAL DECISION MAKING FREE TEXT BOX
33 y/o female with no significant PMHx who presented to the ED for vaginal bleeding X 2 months.  Concern for DUB, Retained POC, UA  Will get labs, UA, and US. Will give IVF and analgesia

## 2025-02-07 NOTE — ED ADULT TRIAGE NOTE - PAIN RATING/NUMBER SCALE (0-10): ACTIVITY
8 (severe pain) Benzoyl Peroxide Counseling: Patient counseled that medicine may cause skin irritation and bleach clothing.  In the event of skin irritation, the patient was advised to reduce the amount of the drug applied or use it less frequently.   The patient verbalized understanding of the proper use and possible adverse effects of benzoyl peroxide.  All of the patient's questions and concerns were addressed.

## 2025-02-07 NOTE — ED ADULT TRIAGE NOTE - CHIEF COMPLAINT QUOTE
lower abdominal pain and vaginal bleeding states since giving vaginal birth 5 months ago, today had clots, well appearing

## 2025-02-08 LAB
CULTURE RESULTS: SIGNIFICANT CHANGE UP
SPECIMEN SOURCE: SIGNIFICANT CHANGE UP

## 2025-02-10 ENCOUNTER — APPOINTMENT (OUTPATIENT)
Dept: OBGYN | Facility: CLINIC | Age: 35
End: 2025-02-10

## 2025-02-10 ENCOUNTER — APPOINTMENT (OUTPATIENT)
Dept: ANTEPARTUM | Facility: CLINIC | Age: 35
End: 2025-02-10

## 2025-03-31 NOTE — ED ADULT NURSE NOTE - CAS TRG GEN SKIN CONDITION
Health Maintenance       Depression Screening (Yearly)  Never done    Colorectal Cancer Screening (View Topic Details)  Never done    Shingles Vaccine (1 of 2)  Never done    Pneumococcal Vaccine 50+ (1 of 1 - PCV)  Never done    Hepatitis C Screening (Once)  Never done    Influenza Vaccine (1)  Overdue since 9/1/2024    COVID-19 Vaccine (1 - 2024-25 season)  Postponed until 4/1/2025    Breast Cancer Screening (Every 2 Years)  Postponed until 3/31/2026    Cervical Cancer Screening (View Topic Details)  Postponed until 4/1/2026    DTaP/Tdap/Td Vaccine (1 - Tdap)  Postponed until 3/31/2027           Following review of the above:  Patient is not proceeding with: Colorectal Cancer Screening, Hepatitis C Screening, Influenza, Pneumococcal, and Shingles    Note: Refer to final orders and clinician documentation.       Dry/Warm

## 2025-04-29 NOTE — ED PROVIDER NOTE - CHIEF COMPLAINT
Subjective   Patient ID: Chente Fraga The patient is a 34y Female complaining of vaginal itching.